# Patient Record
Sex: FEMALE | Race: WHITE | NOT HISPANIC OR LATINO | URBAN - METROPOLITAN AREA
[De-identification: names, ages, dates, MRNs, and addresses within clinical notes are randomized per-mention and may not be internally consistent; named-entity substitution may affect disease eponyms.]

---

## 2023-03-17 LAB
ANION GAP IN SER/PLAS: 13 MMOL/L (ref 10–20)
CALCIUM (MG/DL) IN SER/PLAS: 9 MG/DL (ref 8.6–10.3)
CARBON DIOXIDE, TOTAL (MMOL/L) IN SER/PLAS: 26 MMOL/L (ref 21–32)
CHLORIDE (MMOL/L) IN SER/PLAS: 102 MMOL/L (ref 98–107)
CREATININE (MG/DL) IN SER/PLAS: 0.94 MG/DL (ref 0.5–1.05)
ERYTHROCYTE DISTRIBUTION WIDTH (RATIO) BY AUTOMATED COUNT: 12.9 % (ref 11.5–14.5)
ERYTHROCYTE MEAN CORPUSCULAR HEMOGLOBIN CONCENTRATION (G/DL) BY AUTOMATED: 32.3 G/DL (ref 32–36)
ERYTHROCYTE MEAN CORPUSCULAR VOLUME (FL) BY AUTOMATED COUNT: 93 FL (ref 80–100)
ERYTHROCYTES (10*6/UL) IN BLOOD BY AUTOMATED COUNT: 4.3 X10E12/L (ref 4–5.2)
GFR FEMALE: 85 ML/MIN/1.73M2
GLUCOSE (MG/DL) IN SER/PLAS: 115 MG/DL (ref 74–99)
HEMATOCRIT (%) IN BLOOD BY AUTOMATED COUNT: 40 % (ref 36–46)
HEMOGLOBIN (G/DL) IN BLOOD: 12.9 G/DL (ref 12–16)
LEUKOCYTES (10*3/UL) IN BLOOD BY AUTOMATED COUNT: 7.3 X10E9/L (ref 4.4–11.3)
PLATELETS (10*3/UL) IN BLOOD AUTOMATED COUNT: 302 X10E9/L (ref 150–450)
POTASSIUM (MMOL/L) IN SER/PLAS: 4.2 MMOL/L (ref 3.5–5.3)
SODIUM (MMOL/L) IN SER/PLAS: 137 MMOL/L (ref 136–145)
UREA NITROGEN (MG/DL) IN SER/PLAS: 18 MG/DL (ref 6–23)

## 2023-03-31 ENCOUNTER — HOSPITAL ENCOUNTER (OUTPATIENT)
Dept: DATA CONVERSION | Facility: HOSPITAL | Age: 28
End: 2023-04-01
Attending: SURGERY | Admitting: SURGERY
Payer: COMMERCIAL

## 2023-03-31 DIAGNOSIS — Z15.01 GENETIC SUSCEPTIBILITY TO MALIGNANT NEOPLASM OF BREAST: ICD-10-CM

## 2023-03-31 DIAGNOSIS — K21.9 GASTRO-ESOPHAGEAL REFLUX DISEASE WITHOUT ESOPHAGITIS: ICD-10-CM

## 2023-03-31 DIAGNOSIS — D05.91 UNSPECIFIED TYPE OF CARCINOMA IN SITU OF RIGHT BREAST: ICD-10-CM

## 2023-03-31 DIAGNOSIS — D05.11 INTRADUCTAL CARCINOMA IN SITU OF RIGHT BREAST: ICD-10-CM

## 2023-03-31 DIAGNOSIS — M54.30 SCIATICA, UNSPECIFIED SIDE: ICD-10-CM

## 2023-03-31 LAB
APPEARANCE, URINE: CLEAR
BILIRUBIN, URINE: NEGATIVE
BLOOD, URINE: NEGATIVE
COLOR, URINE: NORMAL
GLUCOSE, URINE: NEGATIVE MG/DL
HCG, URINE: NEGATIVE
KETONES, URINE: NEGATIVE MG/DL
LEUKOCYTE ESTERASE, URINE: NEGATIVE
NITRITE, URINE: NEGATIVE
PH, URINE: 7 (ref 5–8)
PROTEIN, URINE: NEGATIVE MG/DL
SPECIFIC GRAVITY, URINE: 1 (ref 1–1.03)
UROBILINOGEN, URINE: <2 MG/DL (ref 0–1.9)

## 2023-04-10 LAB
COMPLETE PATHOLOGY REPORT: NORMAL
CONVERTED CLINICAL DIAGNOSIS-HISTORY: NORMAL
CONVERTED FINAL DIAGNOSIS: NORMAL
CONVERTED FINAL REPORT PDF LINK TO COPY AND PASTE: NORMAL
CONVERTED GROSS DESCRIPTION: NORMAL
CONVERTED INTRAOPERATIVE DIAGNOSIS: NORMAL
CONVERTED SYNOPTIC DIAGNOSIS: NORMAL

## 2023-08-30 PROBLEM — R92.30 DENSE BREAST: Status: ACTIVE | Noted: 2023-08-30

## 2023-08-30 PROBLEM — R79.89 LOW VITAMIN D LEVEL: Status: ACTIVE | Noted: 2023-08-30

## 2023-08-30 PROBLEM — S39.92XA TAILBONE INJURY: Status: ACTIVE | Noted: 2023-08-30

## 2023-08-30 PROBLEM — Z15.09 BRCA2 GENE MUTATION POSITIVE IN FEMALE: Status: ACTIVE | Noted: 2023-08-30

## 2023-08-30 PROBLEM — Z15.02 BRCA2 GENE MUTATION POSITIVE IN FEMALE: Status: ACTIVE | Noted: 2023-08-30

## 2023-08-30 PROBLEM — D05.11 DUCTAL CARCINOMA IN SITU (DCIS) OF RIGHT BREAST: Status: ACTIVE | Noted: 2023-08-30

## 2023-08-30 PROBLEM — Z15.01 BRCA2 GENE MUTATION POSITIVE IN FEMALE: Status: ACTIVE | Noted: 2023-08-30

## 2023-08-30 PROBLEM — N64.52 NIPPLE DISCHARGE IN FEMALE: Status: ACTIVE | Noted: 2023-08-30

## 2023-08-30 PROBLEM — Z30.9 CONTRACEPTION MANAGEMENT: Status: ACTIVE | Noted: 2023-08-30

## 2023-08-30 PROBLEM — R10.812 ABDOMINAL LEFT UPPER QUADRANT TENDERNESS: Status: ACTIVE | Noted: 2023-08-30

## 2023-08-30 PROBLEM — G44.209 MUSCLE TENSION HEADACHE: Status: ACTIVE | Noted: 2023-08-30

## 2023-08-30 PROBLEM — R92.8 ABNORMAL FINDING ON BREAST IMAGING: Status: ACTIVE | Noted: 2023-08-30

## 2023-08-30 PROBLEM — R92.2 DENSE BREAST: Status: ACTIVE | Noted: 2023-08-30

## 2023-08-30 RX ORDER — CYCLOBENZAPRINE HCL 5 MG
1 TABLET ORAL NIGHTLY PRN
COMMUNITY
Start: 2023-04-17 | End: 2024-05-15 | Stop reason: ALTCHOICE

## 2023-08-30 RX ORDER — CIPROFLOXACIN HYDROCHLORIDE 3 MG/ML
2 SOLUTION/ DROPS OPHTHALMIC
COMMUNITY
Start: 2022-09-25 | End: 2024-05-15 | Stop reason: ALTCHOICE

## 2023-08-30 RX ORDER — DICLOFENAC SODIUM 50 MG/1
1 TABLET, DELAYED RELEASE ORAL EVERY 8 HOURS PRN
COMMUNITY
Start: 2022-12-01 | End: 2024-05-15 | Stop reason: ALTCHOICE

## 2023-08-30 RX ORDER — CELECOXIB 200 MG/1
1 CAPSULE ORAL 2 TIMES DAILY
COMMUNITY
Start: 2022-11-21 | End: 2024-05-15 | Stop reason: ALTCHOICE

## 2023-08-30 RX ORDER — NORETHINDRONE ACETATE AND ETHINYL ESTRADIOL, AND FERROUS FUMARATE 1.5-30(21)
1 KIT ORAL DAILY
COMMUNITY
Start: 2022-09-07 | End: 2024-05-15 | Stop reason: ALTCHOICE

## 2023-08-30 RX ORDER — COPPER 313.4 MG/1
INTRAUTERINE DEVICE INTRAUTERINE
COMMUNITY

## 2023-08-30 RX ORDER — METHOCARBAMOL 500 MG/1
1 TABLET, FILM COATED ORAL EVERY 8 HOURS PRN
COMMUNITY
Start: 2022-11-21 | End: 2024-05-15 | Stop reason: ALTCHOICE

## 2023-08-30 RX ORDER — ALPRAZOLAM 0.5 MG/1
1 TABLET ORAL
COMMUNITY
Start: 2023-02-23 | End: 2024-05-15 | Stop reason: ALTCHOICE

## 2023-08-30 RX ORDER — HYDROXYZINE HYDROCHLORIDE 10 MG/1
1 TABLET, FILM COATED ORAL NIGHTLY
COMMUNITY
Start: 2023-03-16 | End: 2024-05-15 | Stop reason: ALTCHOICE

## 2023-08-30 RX ORDER — ONDANSETRON 8 MG/1
1 TABLET, ORALLY DISINTEGRATING ORAL EVERY 8 HOURS
COMMUNITY
Start: 2022-11-21 | End: 2024-05-15 | Stop reason: ALTCHOICE

## 2023-09-07 VITALS — BODY MASS INDEX: 25.29 KG/M2 | WEIGHT: 148.15 LBS | HEIGHT: 64 IN

## 2023-09-14 NOTE — PROGRESS NOTES
Service: Plastic Surgery     History of Present Illness:   History Present Illness:  Admission Reason: Pain control status post bilateral  mastectomy with reconstruction   HPI:    Ligia is status post bilateral skin sparing mastectomy with reconstruction, immediate, prepectoral direct implant with acellular dermal matrix, 490 cc.  Patient  remained overnight for pain control    Subjective Data:   LIGIA CHINCHILLA is a 27 year old Female who is Hospital Day # 2 and POD #1 for 1. right skin sparing mastectomy;2. lymphatic mapping with dual tracer (technetium + magtrace);3. right axillary sentinel  lymph node biopsy;4. left skin sparing mastectomy;     Doing well, pain is limited to the lateral aspect of the right breast.    Overnight Events: Patient had an uneventful night.     Objective Data:     Objective Information:      T   P  R  BP   MAP  SpO2   Value  36.9  91  18  119/77      95%  Date/Time 4/1 8:00 4/1 8:00 4/1 8:00 4/1 8:00    4/1 8:00  Range  (36C - 37.1C )  (91 - 109 )  (16 - 20 )  (112 - 119 )/ (59 - 77 )    (95% - 97% )  Highest temp of 37.1 C was recorded at 4/1 0:00      Pain reported at 4/1 4:37: 5 = Moderate      T   P  R  BP   MAP  SpO2   Value  36.9  91  18  119/77      95%  Date/Time 4/1 8:00 4/1 8:00 4/1 8:00 4/1 8:00    4/1 8:00  Range  (36C - 37.1C )  (91 - 109 )  (16 - 20 )  (112 - 119 )/ (59 - 77 )    (95% - 97% )  Highest temp of 37.1 C was recorded at 4/1 0:00        Pain reported at 4/1 4:37: 5 = Moderate    Physical Exam Narrative:  ·  Physical Exam:    Focused examination: Right breast no sign of excess oozing or hematoma incision clean dry and intact, flaps warm well perfused, drains serosanguineous, less than  10 cc.  Left breast: No sign of excess oozing or hematoma, flaps warm well perfused, drains less than 10 cc, serosanguineous    Assessment and Plan:   Code Status:  ·  Code Status Full Code       Impression 1: Pain control status post bilateral  mastectomy with direct  implant reconstruction.   Plan for Impression 1: Patient was given a pain medication  plan, she is instructed to alternate Advil with Norco or Tylenol.  And she may take Flexeril as needed every 8 in between.    Plan for discharge home today       Electronic Signatures:  Jim Scruggs)  (Signed 01-Apr-2023 09:14)   Authored: Service, History of Present Illness, Subjective  Data, Objective Data, Assessment and Plan, Note Completion      Last Updated: 01-Apr-2023 09:14 by Jim Scruggs)

## 2023-09-14 NOTE — H&P
"    History & Physical Reviewed:   Pregnant/Lactating:  ·  Are You Pregnant no (1)   ·  Are You Currently Breastfeeding no (1)     I have reviewed the History and Physical dated:  31-Mar-2023   History and Physical reviewed and relevant findings noted. Patient examined to review pertinent physical  findings.: No significant changes   Home Medications Reviewed: no changes noted   Allergies Reviewed: no changes noted       ERAS (Enhanced Recovery After Surgery):  ·  ERAS Patient: no     Consent:   COVID-19 Consent:  ·  COVID-19 Risk Consent Surgeon has reviewed key risks related to the risk of olivia COVID-19 and if they contract COVID-19 what the risks are.       Electronic Signatures:  Jim Scruggs)  (Signed 24-Apr-2023 13:12)   Authored: History & Physical Reviewed, ERAS, Consent,  Note Completion      Last Updated: 24-Apr-2023 13:12 by Jim Scruggs (MD)    References:  1.  Data Referenced From \"Patient Profile - Preop v3\" 31-Mar-2023 07:06   "

## 2023-10-02 NOTE — OP NOTE
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 right skin sparing mastectomy  2. lymphatic mapping with dual tracer (technetium + magtrace)  3. right axillary sentinel lymph node biopsy  4. left skin sparing mastectomy    Anesthesia: No anesthesiologist associated with this case  Estimated Blood Loss: 100ml  Findings: 1. 3 sentinel nodes negative for metastasis on frozen section        Operative Report:   The patient was identified by name and birth date in pre-op and the surgical site was marked with the plastic surgeon.  Technetium 99 was injected in the right  periareolar region in a 4 quadrant fashion. The patient was transferred to the operative suite and placed supine on the OR table.  A sign-in was performed verifying patient identity, procedure and laterality.  One dose of pre-operative antibiotics was  given.  After induction of anesthesia, 2cc of magtrace dye was injected in the subareolar region of the right breast and massaged for several minutes.  Uptake into the corresponding axilla was confirmed using the handheld Neoprobe. The bilateral breasts  and axillae were prepped and draped in the usual sterile fashion.  Just prior to incision, a time-out was performed confirming patient identity, procedure and laterality.  Attention was first turned to the right breast.  A 15-blade scalpel was used to  make an elliptical incision encompassing the NAC.  Flaps were raised superiorly to the clavicle and laterally toward the axilla and latissimus dorsi muscle, medially to the sternum and inferiorly to the inframammary fold.  the breast parenchyma and pectoralis  fascia were dissected off the pectoralis muscle with  Bovie electrocautery. The breast was marked for orientation short suture superior, long suture lateral and passed off the field to pathology.  At this point the sentinel lymph node biopsy was performed.   A handheld probe was used to confirm the area of greatest uptake in the axilla and the clavipectoral fascia was incised to gain entry into the axilla.  A hot and stained node was identified and excised in its entirety.  An additional node was identified  via the magtrace and sentimag and excised.  The axilla was evaluated and there were no palpably abnormal or enlarged nodes.  Frozen section was negative for gadiel metastasis in 3 nodes.  The clavipectoral fascia was then reapproximated with 2-0 Vicryl  suture.      Attention was then turned to the left breast.   A 15-blade scalpel was used to make an elliptical incision encompassing the NAC.  Flaps were raised superiorly to the clavicle and laterally toward the axilla and latissimus dorsi muscle, medially to the  sternum and inferiorly to the inframammary fold.  the breast parenchyma and pectoralis fascia were dissected off the pectoralis muscle with Bovie electrocautery. The breast was marked for orientation short suture superior, long suture lateral and passed  off the field to pathology.  Meticulous hemostasis was achieved using electrocautery and suture ligatures.  At this point, the case was turned over to the plastic surgeon for reconstruction which will be dictated as a separate report.    SPECIMENS:  1. Right skin sparing mastectomy- short suture superior, long lateral  2. right axillary sentinel lymph nodes  3. left skin sparing mastectomy- short suture superior, long lateral      Aston Synoptic Op Report:  Breast Harrold Node Biopsy  Operation performed with curative intent: yes  Tracer(s) used to identify sentinel nodes in the upfront surgery (non-neoadjuvant) setting: radioactive tracer and superparamagnetic iron oxide  Tracer(s) used to identify  sentinel nodes in the neoadjuvant setting: not applicable  All nodes (colored or non-colored) present at the end of a dye-filled lymphatic channel were removed: yes  All significantly radioactive nodes were removed: yes  All palpably suspicious nodes were removed: not applicable  Biopsy-proven positive nodes marked with clips prior to chemotherapy were identified and removed: not applicable                    Attestation:   Note Completion:  Attending Attestation I performed the procedure without a resident         Electronic Signatures:  Addis العلي ()  (Signed 31-Mar-2023 13:58)   Authored: Post-Operative Note, Chart Review, Note Completion      Last Updated: 31-Mar-2023 13:58 by Addis العلي ()

## 2023-10-02 NOTE — OP NOTE
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 BILATERAL SKIN SPARING MASTECTOMY W/RIGHT SENTINEL LYMPH NODE BIOPSY--see Dr. العلي's note  2.  Bilateral abdominal adjacent tissue transfer, 14 x 2 cm per side, for reconstruction of inframammary fold.  3.  Bilateral direct to implant breast reconstruction with 490cc IDEAL IMPLANT structured saline implants  4.  Use of acellular dermal matrix bilaterally  5.  Injection of bilateral intercostal nerves for postoperative analgesia not for intraoperative pain control    Anesthesia: No anesthesiologist associated with this case  Estimated Blood Loss: 50cc  Findings: As expected  Additional Details:     No qualified resident was available and Luz Brito served as my full and primary assistant during the entire case.    IMPLANTS:    RIGHT:  490cc  IDEAL IMPLANT STRUCTURED Saline breast implant  S/n: [id number]  Empty implant volume: 64cc  Back-Inner Lumen: 265cc  Front-Outer Lumen: 161cc  Total: 490cc    Cortiva Silhouette Allograft Dermis 16 x 20cm  REF: [id number]; SN: [id number]      LEFT:   IDEAL IMPLANT STRUCTURED Saline breast implant  S/N:[id number]  Empty implant volume: 64cc  Back-Inner Lumen: 265cc  Front-Outer Lumen: 161cc  Total: 490cc    Cortiva Silhouette Allograft Dermis 16 x 20cm  REF: [id number]; SN: [id number]            Operative Report:     STEWART Strong is a 26 yo female with newly diagnosed right breast cancer who is referred to me by Dr. العلي  Received outside information that patient  from [facility] in the setting of pneumonia and sepsis.   for consultation regarding breast reconstruction. I had a long discussion regarding options for unilateral and bilateral reconstruction, we discussed  indications for both, and stressed that oncologic decisions and discussions are best had with her breast surgeon and oncology team.  We reviewed autologous and implant-based reconstruction. I laid out for her in lay English the benefits and risks of both types of reconstruction. We discussed the most common reconstructions performed, we discussed the anticipated recovery and follow  up for each. I described to her autologous tissue reconstruction.  After reviewing our discussion, it is my impression that she is most interested in DTI breast reconstruction. i think this is a good option for her as she is interested in being smaller in size after surgery. Previously, we discussed and confirmed her  plan to move forward with direct implant breast reconstruction. We discussed that she would be smaller, we discussed that we cannot correctly estimate size prior to surgery, and that we would have all options available. We discussed that she would like  saline structured implants rather than silicone implants. We discussed the risk of infection, we also discussed the risk of needing to perform delayed closure if the mastectomy flaps appear to be suboptimal.    Plan: Direct implant bilateral breast reconstruction.    The patient is indicated for the above-stated procedure.       INFORMED CONSENT  Patient was told the risks, benefits, INDICATIONS, contraindications, and alternatives to the procedure. Risks include but not limited to pain, infection, bleeding, hematoma, seroma, injury to neurovascular and tendinous structures, imperfect cosmesis,  mastectomy flap necrosis, delayed wound healing, imperfect cosmesis, asymmetry, cosmetic dissatisfaction, Implant failure, infection risk, and need for subsequent surgeries.     The patient demonstrated understanding of these risks and  agreed to proceed with surgery.  Advance directives discussed.  Team approach explained.      The patient consented and wished to proceed with the procedure and for medical photography if needed.     PROCEDURAL PAUSE  Prior to the beginning of the procedure, the patient's correct identity, side, site, and procedure to be performed were verified.  The patient was given intravenous antibiotics prior to skin incision.     PROCEDURAL NOTE    Plastic surgery entered the operative theater after our breast colleagues had completed their portion of the mastectomy.  Please see notes for full operative details.    We inspected the mastectomy flaps and concluded that the flaps are well vascularized, and we would be able to proceed with immediate reconstruction.    We began by performing meticulous hemostasis bilaterally using unipolar bipolar cautery.  We then irrigated thoroughly with irrisept solution and normal saline, total of 3 L.    We began on the RIGHT side.  We noted that the IMF had been obliterated during mastectomy and so we began surgical reconstruction by performing advancement of the abdominal tissue, Sheldon flap, for reconstruction of the inframammary fold, total of 14x2 cm.  We advanced this and used  0 PDS suture to secure the Sheldon flap and new IMF to the periosteum of the ribs at the IMF which was marked preoperatively.      We then measured the breast with and noted to be 13 cm, we noted the mastectomy specimen weight to be 774g.  We obtained saline structured implant which we used as a sizer.  We then confirmed the size to be placed in the prepectoral plane to be 490cc, this size was chosen to achieve symmetric reconstruction of the breast without placing too much stress on the  mastectomy flaps.    We allowed the implant to sit in Irrisept solution.  On a back table, we then customized the acellular dermal scaffold (Cortiva Silhouette) to cover the implant, we draped the implant with the ADM to create a  hammock type scaffold, we left the ADM long  anteriorly such that it would secured to the level of the clavicle to create a slope inferiorly to the inferior pole.    We then draped the ADM/implant construct onto the prepectoral plane, we secured the ADM/implant construct superiorly at the level of the superior-most dissection, near the clavicle, this was secured with running and interrupted 2-0 Monocryl suture.   We then worked our way circumferentially from the 12 o'clock position to approximately the 4 and 8:00 positions.      We then placed two 15 Maldivian drains exiting posterior to the anterior axillary line, the first drain drained the IMF, the second drain drained the lateral axilla and the superior pole, these were secured with 0 silk suture.    30 cc of 0.25% marcaine with epinephrine was injected into multiple intercostal nerves through rib periosteum for post operative pain control and not intraoperative analgesia.    After this was completed we irrigated with 1 L of irrisept solution, draped the mastectomy flaps over the reconstruction and performed closure with 2-0 PDS in the fascia, where available, and 3-0 strata fix suture in the deep dermis, which returned  back and ran in the subcuticular layer.    We then turned our attention to the contralateral side, where identical procedure was repeated. We then measured the breast with and noted to be 13cm, and the specimen weight was 683g    We obtained saline structured implant which we used as a sizer.  We then confirmed the size to be placed in the prepectoral plane to be 490cc, this size was chosen to achieve symmetric reconstruction of the breast without placing too much stress on the  mastectomy flaps.    We allowed the implant to sit in Irrisept solution.  On a back table, we then customized the acellular dermal scaffold (Cortiva Silhouette) to cover the implant, we draped the implant with the ADM to create a hammock type scaffold, we left the ADM long   anteriorly such that it would secured to the level of the clavicle to create a slope inferiorly to the inferior pole.    We then draped the ADM/implant construct onto the prepectoral plane, we secured the ADM/implant construct superiorly at the level of the superior-most dissection, near the clavicle, this was secured with running and interrupted 2-0 Monocryl suture.   We then worked our way circumferentially from the 12 o'clock position to approximately the 4 and 8:00 positions.      We then placed two 15 Kenyan drains exiting posterior to the anterior axillary line, the first drain drained the IMF, the second drain drained the lateral axilla and the superior pole, these were secured with 0 silk suture.    30 cc of 0.25% marcaine with epinephrine was injected into multiple intercostal nerves through rib periosteum for post operative pain control and not intraoperative analgesia.     After this was completed we irrigated with 1 L of irrisept solution, draped the mastectomy flaps over the reconstruction and performed closure with 2-0 PDS in the fascia, where available, and 3-0 strata fix suture in the deep dermis, which returned  back and ran in the subcuticular layer.    At the conclusion of each side, we injected 120cc of Irriscept solution through the drains and allowed this to dwell while we continued working.  The drains were then set to bulb suction before leaving the operating room.    Final dressings consisted of dermabond and prineo over incisions, ABDs over the breast, fluffs in the axilla, and surgical bra.    The patient tolerated the procedure well and was awakened from anesthesia without any difficulties, was transferred to the patient in stable condition, all needle counts were correct.    POST OP PLAN:  Ligia will  be discharged when stable.  Discharge medications will include: narcotic pain control, antibiotics, and flexeril.  She is encouraged to shower daily.    We will plan to have postop visit  in 1 week to remove the IMF drain, which will be followed by a postop visit and week 2 to remove the superior  pole drain.                        Attestation:   Note Completion:  Attending Attestation I performed the procedure without a resident         Electronic Signatures:  Jim Scruggs)  (Signed 31-Mar-2023 12:31)   Authored: Post-Operative Note, Chart Review, Note Completion      Last Updated: 31-Mar-2023 12:31 by Jim Scruggs)

## 2023-10-06 ENCOUNTER — APPOINTMENT (OUTPATIENT)
Dept: SURGICAL ONCOLOGY | Facility: CLINIC | Age: 28
End: 2023-10-06

## 2023-10-12 PROBLEM — Z85.3 ENCOUNTER FOR FOLLOW-UP SURVEILLANCE OF BREAST CANCER: Status: ACTIVE | Noted: 2023-10-12

## 2023-10-12 PROBLEM — Z90.13 S/P BILATERAL MASTECTOMY: Status: ACTIVE | Noted: 2023-10-12

## 2023-10-12 PROBLEM — Z08 ENCOUNTER FOR FOLLOW-UP SURVEILLANCE OF BREAST CANCER: Status: ACTIVE | Noted: 2023-10-12

## 2023-10-12 NOTE — PROGRESS NOTES
Ligia Franklin female   1995 28 y.o.   [id number]    Chief Complaint  Breast cancer surveillance.     History Of Present Illness  Ligia Franklin is a pleasant 28 y.o. 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 It was ER+95%, NH+35%. Given her young age of onset and unknown biological history she was genetically tested and tested positive for the BRCA2 mutation. On 3/31/2023 Dr. Addis العلي performed a right skin sparing mastectomy and right sentinel lymph node biopsy (0/3), DCIS only spanning 5cm on path. Margins negative. Focal 1mm anterior margin. Reviewed at tumor board and no re-excision recommended. Left skin sparing mastectomy showed benign findings. Dr. Jim Scru[employer] plastics did immediate reconstruction with bilateral prepectoral direct saline implants. She was presented at Tumor Board and there is no indication for radiation therapy. Given DCIS findings alone and s/p bilateral mastectomy tumor board did not recommend endocrine therapy and patient declined therapy. She was referred to gyn/onc for evaluation for ovarian cancer screening and eventually risk reducing surgery and will see them 2023. She presents today for follow up clinical exam. No breast concerns.  [id number] stage     REPRODUCTIVE HISTORY: menarche age 14, , first birth age 26, did not breastfeed, OCP's x 10 years, premenopausal                              FAMILY CANCER HISTORY:   Patient is adopted but states her adoptive mother knows the bio mother's family history and negative    Review of Systems  Constitutional:  Negative for appetite change, fatigue, fever and unexpected weight change.   HENT:  Negative for ear pain, hearing loss, nosebleeds, sore throat and trouble swallowing.    Eyes:  Negative for discharge, itching and visual disturbance.   Breast: As stated in HPI.  Respiratory:  Negative for cough, chest tightness and shortness of  breath.    Cardiovascular:  Negative for chest pain, palpitations and leg swelling.   Gastrointestinal:  Negative for abdominal pain, constipation, diarrhea and nausea.   Endocrine: Negative for cold intolerance and heat intolerance.   Genitourinary:  Negative for dysuria, frequency, hematuria, pelvic pain and vaginal bleeding.   Musculoskeletal:  Negative for arthralgias, back pain, gait problem, joint swelling and myalgias.   Skin:  Negative for color change and rash.   Allergic/Immunologic: Negative for environmental allergies and food allergies.   Neurological:  Negative for dizziness, tremors, speech difficulty, weakness, numbness and headaches.   Hematological:  Does not bruise/bleed easily.   Psychiatric/Behavioral:  Negative for agitation, dysphoric mood and sleep disturbance. The patient is not nervous/anxious.      Past Medical History  She has a past medical history of Abnormal glucose complicating pregnancy (03/21/2022), Encounter for screening for diabetes mellitus (07/02/2021), Encounter for supervision of normal pregnancy, unspecified, unspecified trimester (03/21/2022), Nonpurulent mastitis associated with the puerperium (03/21/2022), and Unspecified disorders of lactation (03/21/2022).    Surgical History  Past Surgical History:   Procedure Laterality Date    BREAST BIOPSY  March    MASTECTOMY  March    OTHER SURGICAL HISTORY  04/20/2022    No history of surgery        Family History  Cancer-related family history includes Cancer in her father.     Social History  Social History     Tobacco Use    Smoking status: Never    Smokeless tobacco: Never   Substance Use Topics    Alcohol use: Never        Allergies  Patient has no known allergies.    Medications  Current Outpatient Medications   Medication Instructions    ALPRAZolam (Xanax) 0.5 mg tablet 1 tablet, oral, TAKE PER DIRECTED    benzocaine (Americaine) 20 % topical spray 1 Application, Topical, 4 times daily PRN    celecoxib (CeleBREX) 200 mg  "capsule 1 capsule, oral, 2 times daily    ciprofloxacin (Ciloxan) 0.3 % ophthalmic solution 2 drops, Both Eyes, Every 2 hours, PER DIRECTED    copper (ParaGard T 380A) 380 square mm IUD intrauterine, PER DIRECTED    cyclobenzaprine (Flexeril) 5 mg tablet 1 tablet, oral, Nightly PRN    diclofenac (Voltaren) 50 mg EC tablet 1 tablet, oral, Every 8 hours PRN    hydrOXYzine HCL (Atarax) 10 mg tablet 1 tablet, oral, Nightly    Junel FE 1.5/30, 28, 1.5 mg-30 mcg (21)/75 mg (7) tablet 1 tablet, oral, Daily, as directed    methocarbamol (Robaxin) 500 mg tablet 1 tablet, oral, Every 8 hours PRN    ondansetron ODT (Zofran-ODT) 8 mg disintegrating tablet 1 tablet, oral, Every 8 hours, DISSOLVE 1 TAB ON YOUR MOUTH PER DIRECTED    oxyCODONE (OXAYDO) 5 mg, oral, 3 times daily PRN    witch hazel-glycerin-aloe vera 50 % pads, medicated 1 Application, Topical, Every 6 hours PRN       Last Recorded Vitals  Blood pressure 110/82, pulse 90, resp. rate 16, height 1.626 m (5' 4\"), weight 68 kg (150 lb).     Physical Exam  Physical Exam  Chest:       Patient is alert and oriented x3 and in a relaxed and appropriate mood. Her gait is steady and hand grasps are equal. Sclera is clear. The breasts are nearly symmetrical. Bilateral breasts have been removed with well-healed mastectomy incision. Overlying tissue soft without palpable abnormalities, discrete nodules or masses. The skin appears normal. Bilateral implants are soft and mobile. There is no cervical, supraclavicular or axillary lymphadenopathy. Heart rate and rhythm normal, S1 and S2 appreciated. The lungs are clear to auscultation bilaterally. Abdomen is soft and non-tender.     Orders  No orders of the defined types were placed in this encounter.      Visit Diagnosis  1. Encounter for follow-up surveillance of breast cancer        2. BRCA2 positive        3. S/P bilateral mastectomy            Assessment/Plan  Breast cancer surveillance, normal clinical exam, history of right " breast cancer, ER+/AL+, s/p bilateral skin sparing mastectomy with saline implants, BRCA2+, unknown family history    Plan:  Return in April 2024 for clinical exam and office visit.     Patient Discussion/Summary  Your clinical examination is normal. Please return in April 2024 for clinical exam and office visit or sooner if you have any problems or concerns.     You can see your health information, review clinical summaries from office visits & test results online when you follow your health with MY  Chart, a personal health record. To sign up go to [email or web address]. If you need assistance with signing up or trouble getting into your account call [employer] Patient Line 24/7 at [phone].    My office phone number is [phone] if you need to get in touch with me or have additional questions or concerns. Thank you for choosing [facility] and trusting me as your healthcare provider. I look forward to seeing you again at your next office visit. I am honored to be a provider on your health care team and I remain dedicated to helping you achieve your health goals.    Radha Bowers, APRN-CNP

## 2023-10-13 ENCOUNTER — OFFICE VISIT (OUTPATIENT)
Dept: SURGICAL ONCOLOGY | Facility: HOSPITAL | Age: 28
End: 2023-10-13
Payer: COMMERCIAL

## 2023-10-13 VITALS
BODY MASS INDEX: 25.61 KG/M2 | DIASTOLIC BLOOD PRESSURE: 82 MMHG | WEIGHT: 150 LBS | HEIGHT: 64 IN | RESPIRATION RATE: 16 BRPM | SYSTOLIC BLOOD PRESSURE: 110 MMHG | HEART RATE: 90 BPM

## 2023-10-13 DIAGNOSIS — Z15.09 BRCA2 POSITIVE: ICD-10-CM

## 2023-10-13 DIAGNOSIS — Z85.3 ENCOUNTER FOR FOLLOW-UP SURVEILLANCE OF BREAST CANCER: Primary | ICD-10-CM

## 2023-10-13 DIAGNOSIS — Z90.13 S/P BILATERAL MASTECTOMY: ICD-10-CM

## 2023-10-13 DIAGNOSIS — Z15.01 BRCA2 POSITIVE: ICD-10-CM

## 2023-10-13 DIAGNOSIS — Z08 ENCOUNTER FOR FOLLOW-UP SURVEILLANCE OF BREAST CANCER: Primary | ICD-10-CM

## 2023-10-13 PROCEDURE — 1036F TOBACCO NON-USER: CPT | Performed by: NURSE PRACTITIONER

## 2023-10-13 PROCEDURE — 99213 OFFICE O/P EST LOW 20 MIN: CPT | Performed by: NURSE PRACTITIONER

## 2023-11-06 ENCOUNTER — OFFICE VISIT (OUTPATIENT)
Dept: GYNECOLOGIC ONCOLOGY | Facility: CLINIC | Age: 28
End: 2023-11-06
Payer: COMMERCIAL

## 2023-11-06 VITALS
SYSTOLIC BLOOD PRESSURE: 139 MMHG | RESPIRATION RATE: 16 BRPM | OXYGEN SATURATION: 100 % | HEART RATE: 84 BPM | TEMPERATURE: 97.3 F | WEIGHT: 153.66 LBS | BODY MASS INDEX: 26.38 KG/M2 | DIASTOLIC BLOOD PRESSURE: 86 MMHG

## 2023-11-06 DIAGNOSIS — Z15.01 BRCA2 POSITIVE: Primary | ICD-10-CM

## 2023-11-06 DIAGNOSIS — Z15.09 BRCA2 POSITIVE: Primary | ICD-10-CM

## 2023-11-06 PROCEDURE — 1036F TOBACCO NON-USER: CPT | Performed by: OBSTETRICS & GYNECOLOGY

## 2023-11-06 PROCEDURE — 99214 OFFICE O/P EST MOD 30 MIN: CPT | Performed by: OBSTETRICS & GYNECOLOGY

## 2023-11-06 PROCEDURE — 99204 OFFICE O/P NEW MOD 45 MIN: CPT | Performed by: OBSTETRICS & GYNECOLOGY

## 2023-11-06 ASSESSMENT — PATIENT HEALTH QUESTIONNAIRE - PHQ9
8. MOVING OR SPEAKING SO SLOWLY THAT OTHER PEOPLE COULD HAVE NOTICED. OR THE OPPOSITE, BEING SO FIGETY OR RESTLESS THAT YOU HAVE BEEN MOVING AROUND A LOT MORE THAN USUAL: 0
4. FEELING TIRED OR HAVING LITTLE ENERGY: NOT AT ALL
6. FEELING BAD ABOUT YOURSELF - OR THAT YOU ARE A FAILURE OR HAVE LET YOURSELF OR YOUR FAMILY DOWN: NOT AT ALL
9. THOUGHTS THAT YOU WOULD BE BETTER OFF DEAD, OR OF HURTING YOURSELF: NOT AT ALL
2. FEELING DOWN, DEPRESSED, IRRITABLE, OR HOPELESS: NOT AT ALL
7. TROUBLE CONCENTRATING ON THINGS, SUCH AS READING THE NEWSPAPER OR WATCHING TELEVISION: NOT AT ALL
1. LITTLE INTEREST OR PLEASURE IN DOING THINGS: NOT AT ALL
3. TROUBLE FALLING OR STAYING ASLEEP OR SLEEPING TOO MUCH: NOT AT ALL
2. FEELING DOWN, DEPRESSED OR HOPELESS: NOT AT ALL
7. TROUBLE CONCENTRATING ON THINGS, SUCH AS READING THE NEWSPAPER OR WATCHING TELEVISION: 0
7. TROUBLE CONCENTRATING ON THINGS, SUCH AS READING THE NEWSPAPER OR WATCHING TELEVISION: NOT AT ALL
3. TROUBLE FALLING OR STAYING ASLEEP OR SLEEPING TOO MUCH: 0
6. FEELING BAD ABOUT YOURSELF - OR THAT YOU ARE A FAILURE OR HAVE LET YOURSELF OR YOUR FAMILY DOWN: NOT AT ALL
9. THOUGHTS THAT YOU WOULD BE BETTER OFF DEAD, OR OF HURTING YOURSELF: NOT AT ALL
10. IF YOU CHECKED OFF ANY PROBLEMS, HOW DIFFICULT HAVE THESE PROBLEMS MADE IT FOR YOU TO DO YOUR WORK, TAKE CARE OF THINGS AT HOME, OR GET ALONG WITH OTHER PEOPLE: NOT DIFFICULT AT ALL
SUM OF ALL RESPONSES TO PHQ QUESTIONS 1-9: 0
4. FEELING TIRED OR HAVING LITTLE ENERGY: 0
5. POOR APPETITE OR OVEREATING: 0
SUM OF ALL RESPONSES TO PHQ QUESTIONS 1-9: 0
9. THOUGHTS THAT YOU WOULD BE BETTER OFF DEAD, OR OF HURTING YOURSELF: 0
8. MOVING OR SPEAKING SO SLOWLY THAT OTHER PEOPLE COULD HAVE NOTICED. OR THE OPPOSITE, BEING SO FIGETY OR RESTLESS THAT YOU HAVE BEEN MOVING AROUND A LOT MORE THAN USUAL: NOT AT ALL
2. FEELING DOWN, DEPRESSED, IRRITABLE, OR HOPELESS: 0
5. POOR APPETITE OR OVEREATING: NOT AT ALL
6. FEELING BAD ABOUT YOURSELF - OR THAT YOU ARE A FAILURE OR HAVE LET YOURSELF OR YOUR FAMILY DOWN: 0
1. LITTLE INTEREST OR PLEASURE IN DOING THINGS: NOT AT ALL
5. POOR APPETITE OR OVEREATING: NOT AT ALL
3. TROUBLE FALLING OR STAYING ASLEEP OR SLEEPING TOO MUCH: NOT AT ALL
1. LITTLE INTEREST OR PLEASURE IN DOING THINGS: 0
8. MOVING OR SPEAKING SO SLOWLY THAT OTHER PEOPLE COULD HAVE NOTICED. OR THE OPPOSITE, BEING SO FIGETY OR RESTLESS THAT YOU HAVE BEEN MOVING AROUND A LOT MORE THAN USUAL: NOT AT ALL
4. FEELING TIRED OR HAVING LITTLE ENERGY: NOT AT ALL
10. IF YOU CHECKED OFF ANY PROBLEMS, HOW DIFFICULT HAVE THESE PROBLEMS MADE IT FOR YOU TO DO YOUR WORK, TAKE CARE OF THINGS AT HOME, OR GET ALONG WITH OTHER PEOPLE: NOT DIFFICULT AT ALL

## 2023-11-06 ASSESSMENT — PAIN SCALES - GENERAL: PAINLEVEL: 0-NO PAIN

## 2023-11-06 NOTE — PROGRESS NOTES
Patient ID: Ligia Franklin is a 28 y.o. female.  Referring Physician: No referring provider defined for this encounter.  Primary Care Provider: JOEY Hilliard-JIM      Subjective    27 yo with BRCA2 mutation    Interval history - She carries the BRCA2 mutation and is here to see what the next steps will be she had a double mastectomy in 3/2023 for breast cancer and this was discovered when she found brown nipple discharge. States she has regular periods and has had IUD for 6 months. Denies any weight loss or gain, her appetite is good. Reports a Hx of mastitis 2x. Expressed she broke her tailbone during delivery of her son.    Pmh - none  PSH - none    SH  She lives with her  and child and is employed as a .  Denies tobacco, alcohol or illicit drug use.    FH  Her father had cancer  Her grandmother had breast cancer  No family history of uterine, ovarian or colon cancer.     Ob/Gyn hx   1 vaginal delivery  Denies abnormal PAPs        Review of Systems   All other systems reviewed and are negative.       Objective   BSA: 1.77 meters squared  /86 (BP Location: Right arm, Patient Position: Sitting, BP Cuff Size: Adult)   Pulse 84   Temp 36.3 °C (97.3 °F) (Temporal)   Resp 16   Wt 69.7 kg (153 lb 10.6 oz)   SpO2 100%   BMI 26.38 kg/m²      Family History   Problem Relation Name Age of Onset    Other (RIGHT VENTRICULAR DYSPLASIA) Mother      Cancer Father Dont know        Ligia Franklin  reports that she has never smoked. She has never used smokeless tobacco.  She  reports no history of alcohol use.  She  reports no history of drug use.    Physical Exam  Constitutional:       General: She is not in acute distress.     Appearance: Normal appearance.   Cardiovascular:      Rate and Rhythm: Normal rate and regular rhythm.   Pulmonary:      Effort: Pulmonary effort is normal.      Breath sounds: Normal breath sounds.   Abdominal:      General: Bowel sounds are normal. There  is no distension.   Genitourinary:     Comments: Cervix appears normal IUD strings visible no obvious adnexal masses on bimanual or rectovaginal exam  Musculoskeletal:      Right forearm: Normal.      Left forearm: Normal.      Right hand: Normal.      Left hand: Normal.      Right lower leg: Normal.      Left lower leg: Normal.      Right foot: Normal.      Left foot: Normal.         Performance Status:  Asymptomatic    Assessment/Plan      Oncology History    No history exists.          Problem List Items Addressed This Visit             ICD-10-CM    BRCA2 positive - Primary Z15.01, Z15.09       Treatment Plans       No treatment plans exist        - Discussed her BRCA2 mutation and chances of ovarian cancer and the recommended treatment includes risk reducing BSO at age 40-45 or upon completion of childbearing.  Discussed low yield of screening with TVUS and/or .    - Follow up in 6 months with Dr. Tabitha Trevino. Pelvic US for baseline before visit.          Scribe Attestation  By signing my name below, Tereza RUST Scribe   attest that this documentation has been prepared under the direction and in the presence of Tabitha Trevino MD.

## 2023-11-27 LAB
GENETICS TEST NAME-DATA CONVERSION: NORMAL
LAB MOLECULAR CA TECHNICAL NOTES: NORMAL

## 2023-12-08 ENCOUNTER — TELEPHONE (OUTPATIENT)
Dept: OBSTETRICS AND GYNECOLOGY | Facility: CLINIC | Age: 28
End: 2023-12-08

## 2024-01-18 ENCOUNTER — APPOINTMENT (OUTPATIENT)
Dept: PRIMARY CARE | Facility: CLINIC | Age: 29
End: 2024-01-18

## 2024-01-25 ENCOUNTER — APPOINTMENT (OUTPATIENT)
Dept: PRIMARY CARE | Facility: CLINIC | Age: 29
End: 2024-01-25

## 2024-02-12 ENCOUNTER — PATIENT MESSAGE (OUTPATIENT)
Dept: SURGICAL ONCOLOGY | Facility: CLINIC | Age: 29
End: 2024-02-12

## 2024-02-12 NOTE — TELEPHONE ENCOUNTER
From: Ligia Franklin  To: Addis العلي DO  Sent: 2/12/2024 8:39 AM EST  Subject: Appt     Hi! I was wondering if the April 16th appt is something my  should come too? And if he should if there is any after 3:30 appts ? Sorry to be annoying!

## 2024-04-10 NOTE — PROGRESS NOTES
BREAST SURGICAL ONCOLOGY FOLLOW UP     Assessment/Plan     1. right breast nipple discharge with DCIS g2 at 6:30 2cmFN measuring 1.7cm MRI with 11cm of enhancement s/p bilateral SSM with 5cm of DCIS on final pathology  - [id number] stage 0  - BRCA2+  -s/p bilateral SSM right slnb(0/3)     Clinical breast exam today is normal.  There is no evidence of local recurrence.    Continue follow up with gyn/onc as scheduled.    Will follow up in the breast center with me in 1 year or sooner if there are any breast concerns.     Trevin Franklin is a 29 y.o. female presents today for follow up carcinoma of the right breast.     Treatment history  Surgery: 3/31/2023: right SSM right slnb (0/3) DCIS only spanning 5cm on path. Margins negative. Focal 1mm anterior margin. Reviewed at tumor board- no re-excision recommended.   left SSM: benign findings  Radiation: NA  Systemic therapy: NA    Has since met with Dr. Trevino to discuss eventual risk reducing BSO at 40-45 or after childbearing is complete.    Felt her implant shifted overnight but then returned to normal position.  Possibly considering going smaller but hesitant to undergo more surgery.    She's had nipple tattooing with [employer].    Review of Systems   Constitutional symptoms: Denies generalized fatigue.  Denies weight change, fevers/chills, difficulty sleeping   Eyes: Denies double vision, glaucoma, cataracts.  Ear/nose/throat/mouth: Denies hearing changes, sore throat, sinus problems.  Cardiovascular: No chest pain.  Denies irregular heartbeat.  Denies ankle swelling.  Respiratory: No wheezing, cough, or shortness of breath.  Gastrointestinal: No abdominal pain,  No nausea/vomiting.  No indigestion/heartburn.  No change in bowel habits.  No constipation or diarrhea.   Genitourinary: No urinary incontinence.  No urinary frequency.  No painful urination.  Musculoskeletal: No bone pain, no muscle pain, no joint pain.   Integumentary: No rash. No  masses.  No changes in moles.  No easy bruising.  Neurological: No headaches.  No tremors. No numbness/tingling.  Psychiatric: No anxiety. No depression.  Endocrine: No excessive thirst.  Not too hot or too cold.  Not tired or fatigued.    Hematological/lymphatic: No swollen glands or blood clotting problems.  No bruising.    Objective   Physical Exam  General: Alert and oriented x 3.  Mood and affect are appropriate.  HEENT: EOMI, PERRLA.   Neck: supple, no masses, no cervical adenopathy.  Cardiovascular: no lower extremity edema.  Pulmonary: breathing non labored on room air.  GI: Abdomen soft, no masses. No hepatomegaly or splenomegaly.  Lymph nodes: No supraclavicular or axillary adenopathy bilaterally.  Musculoskeletal: Full range of motion in the upper extremities bilaterally.  Neuro: denies dizziness, tremors    Breasts: The breasts were examined in both the seated and supine positions.    RIGHT: surgically absent with implant based reconstruction. There are no skin changes, skin thickening, or dimpling. There are no masses palpated in the RIGHT breast. Tattooed NAC.  LEFT: surgically absent with implant based reconstruction. There are no skin changes, skin thickening, or dimpling. There are no masses palpated in the LEFT breast.  Tattooed NAC.      Radiology review: All images and reports were personally reviewed.         Addis العلي DO

## 2024-04-16 ENCOUNTER — OFFICE VISIT (OUTPATIENT)
Dept: SURGICAL ONCOLOGY | Facility: CLINIC | Age: 29
End: 2024-04-16
Payer: COMMERCIAL

## 2024-04-16 DIAGNOSIS — Z15.01 BRCA2 POSITIVE: ICD-10-CM

## 2024-04-16 DIAGNOSIS — Z15.09 BRCA2 POSITIVE: ICD-10-CM

## 2024-04-16 DIAGNOSIS — D05.11 DUCTAL CARCINOMA IN SITU (DCIS) OF RIGHT BREAST: Primary | ICD-10-CM

## 2024-04-16 PROCEDURE — 99213 OFFICE O/P EST LOW 20 MIN: CPT | Performed by: SURGERY

## 2024-05-08 ENCOUNTER — PATIENT MESSAGE (OUTPATIENT)
Dept: SURGICAL ONCOLOGY | Facility: HOSPITAL | Age: 29
End: 2024-05-08
Payer: COMMERCIAL

## 2024-05-08 NOTE — TELEPHONE ENCOUNTER
From: Ligia Franklin  To: Addis العلي  Sent: 5/8/2024 12:54 PM EDT  Subject: Medical question     hi! Since I had my breast surgery, I feel like I have been really struggling to lose weight and it’s just been a huge struggle to be comfortable in my own body again. Is there anything that you guys can do to help? I didn’t know if there was like a weight loss pill or something I could do to just try to get myself on track again And make me feel better and more confident. I also didn’t know if I needed a primary care doctor for this and if so, is there anybody that you would recommend?

## 2024-05-15 ENCOUNTER — OFFICE VISIT (OUTPATIENT)
Dept: PRIMARY CARE | Facility: CLINIC | Age: 29
End: 2024-05-15
Payer: COMMERCIAL

## 2024-05-15 VITALS
DIASTOLIC BLOOD PRESSURE: 78 MMHG | HEART RATE: 64 BPM | BODY MASS INDEX: 26.98 KG/M2 | WEIGHT: 158 LBS | HEIGHT: 64 IN | SYSTOLIC BLOOD PRESSURE: 110 MMHG | TEMPERATURE: 97.6 F

## 2024-05-15 DIAGNOSIS — G43.719 INTRACTABLE CHRONIC MIGRAINE WITHOUT AURA AND WITHOUT STATUS MIGRAINOSUS: ICD-10-CM

## 2024-05-15 DIAGNOSIS — F41.9 ANXIETY: ICD-10-CM

## 2024-05-15 DIAGNOSIS — R63.8 DIFFICULTY MAINTAINING WEIGHT: ICD-10-CM

## 2024-05-15 DIAGNOSIS — Z76.89 ENCOUNTER TO ESTABLISH CARE: ICD-10-CM

## 2024-05-15 DIAGNOSIS — R79.89 LOW VITAMIN D LEVEL: Primary | ICD-10-CM

## 2024-05-15 PROCEDURE — 99214 OFFICE O/P EST MOD 30 MIN: CPT | Performed by: NURSE PRACTITIONER

## 2024-05-15 PROCEDURE — 1036F TOBACCO NON-USER: CPT | Performed by: NURSE PRACTITIONER

## 2024-05-15 RX ORDER — BUPROPION HYDROCHLORIDE 150 MG/1
150 TABLET ORAL EVERY MORNING
Qty: 30 TABLET | Refills: 1 | Status: SHIPPED | OUTPATIENT
Start: 2024-05-15 | End: 2024-07-14

## 2024-05-15 RX ORDER — SUMATRIPTAN 50 MG/1
50 TABLET, FILM COATED ORAL ONCE AS NEEDED
Qty: 9 TABLET | Refills: 5 | Status: SHIPPED | OUTPATIENT
Start: 2024-05-15 | End: 2024-05-20 | Stop reason: WASHOUT

## 2024-05-15 ASSESSMENT — ENCOUNTER SYMPTOMS
NAUSEA: 0
SLEEP DISTURBANCE: 0
COUGH: 0
DIARRHEA: 0
FATIGUE: 0
VOMITING: 0
CONSTIPATION: 0
WEAKNESS: 0
FEVER: 0
AGITATION: 0
NERVOUS/ANXIOUS: 0
SHORTNESS OF BREATH: 0

## 2024-05-15 NOTE — PROGRESS NOTES
"Subjective   Patient ID: Ligia Franklin is a 29 y.o. female who presents for New Patient Visit.    She is here to establish care, [facility] breast cancer , mastectomy bilaterally and now with implants. She states one was recently dislodged by her young son accidentally and concerned she will need surgery again.    She is healthy otherwise and exercises daily. She wants to loose weight and has been unable to. She is not overweight. She also gets anxious at times with her daily life, children and health concerns.     She gets headaches about 2 x weekly and difficult to get relief with Tylenol or ibuprofen         Review of Systems   Constitutional:  Negative for fatigue and fever.   Respiratory:  Negative for cough and shortness of breath.    Cardiovascular:  Negative for chest pain and leg swelling.   Gastrointestinal:  Negative for constipation, diarrhea, nausea and vomiting.   Skin:  Negative for pallor and rash.   Neurological:  Negative for weakness.   Psychiatric/Behavioral:  Negative for agitation, behavioral problems and sleep disturbance. The patient is not nervous/anxious.        Objective   /78   Pulse 64   Temp 36.4 °C (97.6 °F)   Ht 1.626 m (5' 4\")   Wt 71.7 kg (158 lb)   BMI 27.12 kg/m²     Physical Exam  Vitals and nursing note reviewed.   Constitutional:       General: She is not in acute distress.  HENT:      Head: Normocephalic and atraumatic.   Eyes:      Pupils: Pupils are equal, round, and reactive to light.   Cardiovascular:      Rate and Rhythm: Normal rate and regular rhythm.   Pulmonary:      Effort: Pulmonary effort is normal.      Breath sounds: Normal breath sounds.   Skin:     General: Skin is warm and dry.   Neurological:      Mental Status: She is alert and oriented to person, place, and time.   Psychiatric:         Mood and Affect: Mood normal.         Assessment/Plan   Problem List Items Addressed This Visit             ICD-10-CM    Low vitamin D level - Primary R79.89    Relevant " Orders    Vitamin D 25-Hydroxy,Total (for eval of Vitamin D levels)    Hemoglobin A1C    Difficulty maintaining weight R63.8    Relevant Medications    buPROPion XL (Wellbutrin XL) 150 mg 24 hr tablet     Other Visit Diagnoses         Codes    Anxiety     F41.9    Relevant Medications    buPROPion XL (Wellbutrin XL) 150 mg 24 hr tablet    Other Relevant Orders    CBC    Comprehensive Metabolic Panel    TSH with reflex to Free T4 if abnormal    Vitamin D 25-Hydroxy,Total (for eval of Vitamin D levels)    Lipid Panel    Cortisol    Hemoglobin A1C    Intractable chronic migraine without aura and without status migrainosus     G43.719    Relevant Medications    SUMAtriptan (Imitrex) 50 mg tablet    Encounter to establish care     Z76.89

## 2024-05-20 ENCOUNTER — OFFICE VISIT (OUTPATIENT)
Dept: GYNECOLOGIC ONCOLOGY | Facility: CLINIC | Age: 29
End: 2024-05-20
Payer: COMMERCIAL

## 2024-05-20 ENCOUNTER — OFFICE VISIT (OUTPATIENT)
Dept: PLASTIC SURGERY | Facility: CLINIC | Age: 29
End: 2024-05-20
Payer: COMMERCIAL

## 2024-05-20 VITALS
TEMPERATURE: 98.1 F | OXYGEN SATURATION: 98 % | WEIGHT: 152.12 LBS | SYSTOLIC BLOOD PRESSURE: 114 MMHG | DIASTOLIC BLOOD PRESSURE: 70 MMHG | HEART RATE: 85 BPM | BODY MASS INDEX: 26.11 KG/M2

## 2024-05-20 VITALS
WEIGHT: 158 LBS | HEIGHT: 64 IN | DIASTOLIC BLOOD PRESSURE: 79 MMHG | BODY MASS INDEX: 26.98 KG/M2 | SYSTOLIC BLOOD PRESSURE: 125 MMHG | HEART RATE: 92 BPM

## 2024-05-20 DIAGNOSIS — Z15.01 BRCA2 POSITIVE: Primary | ICD-10-CM

## 2024-05-20 DIAGNOSIS — Z90.13 S/P BILATERAL MASTECTOMY: Primary | ICD-10-CM

## 2024-05-20 DIAGNOSIS — Z15.09 BRCA2 POSITIVE: Primary | ICD-10-CM

## 2024-05-20 DIAGNOSIS — D05.11 DUCTAL CARCINOMA IN SITU (DCIS) OF RIGHT BREAST: ICD-10-CM

## 2024-05-20 PROCEDURE — 99213 OFFICE O/P EST LOW 20 MIN: CPT | Performed by: OBSTETRICS & GYNECOLOGY

## 2024-05-20 PROCEDURE — 99213 OFFICE O/P EST LOW 20 MIN: CPT | Performed by: PHYSICIAN ASSISTANT

## 2024-05-20 PROCEDURE — 1036F TOBACCO NON-USER: CPT | Performed by: OBSTETRICS & GYNECOLOGY

## 2024-05-20 ASSESSMENT — PAIN SCALES - GENERAL: PAINLEVEL: 0-NO PAIN

## 2024-05-20 NOTE — PROGRESS NOTES
Department of Plastic and Reconstructive Surgery            Follow Up Visit    Date: 05/20/24  Date of Surgery: 3/31/23    Trevin Franklin is a 29 y.o. female who is s/p B/L skin sparing mastectomy with right sentinel lymph node biopsy and immediate direct to implant breast reconstruction (490cc ideal saline implant) and placement of ADM on 3/31/23.    She returns today for 1 year follow up. She has complaints of implants falling and sitting low. She endorses the implants are moving around and flipping. She states that this is causing her discomfort. She notes that the left breast sits lower than the right and there is excess skin in her armpit region on the left. She additionally feels the implants are too large and and weigh her chest down. She is interested in revision reconstruction.     Objective   Vital Signs:   Vitals:    05/20/24 0818   BP: 125/79   Pulse: 92       Gen: interactive and pleasant  Head: NCAT  Eyes: EOMI, PERRLA  Mouth: MMM  Throat: trachea midline  Cor: RRR  Pulm: nonlabored breathing  Abd: s/nt/nd  Neuro: AAOx3  Ext: extremities perfused    Focused exam of the: breast    Right breast with transverse surgical incision is well healed. There is ptosis of the right breast implant with loss of capsule. There is excess skin present that is causing movement of the implant. There is superior and medial hollowing noted.      Left breast with transverse surgical incision is well healed. There is ptosis of the left breast implant with loss of capsule. Left breast is sitting lower than the right and there is lifting of the implant at the bottom of the breast. There is excess skin present that is causing movement of the implant. There is superior and medial hollowing noted L>R.     Assessment/Plan     Ligia Franklin is a 29 y.o. female who is s/p B/L skin sparing mastectomy with right sentinel lymph node biopsy and immediate direct to implant breast reconstruction (490cc  ideal saline implant) and placement of ADM on 3/31/23.    She presents for 1 year POV. She presents today to discuss revision reconstruction. She is 1 year post operative. She has complaints of implants hanging too low. There is bilateral ptosis of the implant since surgery L>R. There is excess axillary skin and tissue of the left breast and there is superior and medial hollowing bilaterally.    Plan:   We discussed bilateral implant replacement for smaller implant, excision excess skin of the breast and left axillary region, capsulotomy/capsulorrhaphy to tighten the capsule, and autologous fat grafting for superior and medial hollowing.       I spent 20 minutes with this patient. Greater than 50% of this time was spent in the counselling and/or coordination of care of this patient.  This note was created using voice recognition software and was not corrected for typographical or grammatical errors.    Signature: Luz Brito PA-C  Date: 05/20/24

## 2024-05-20 NOTE — PROGRESS NOTES
Patient ID: Ligia Franklin is a 29 y.o. female.  Referring Physician: No referring provider defined for this encounter.  Primary Care Provider: Rosa Maria Bruno, APRN-CNP    Subjective    29 yo with BRCA2 mutation    Interval history - She carries the BRCA2 mutation and is here to see what the next steps will be she had a double mastectomy in 3/2023 for breast cancer and this was discovered when she found brown nipple discharge. States she has regular periods and has had IUD for 6 months. Denies any weight loss or gain, her appetite is good. Reports a Hx of mastitis 2x. Expressed she broke her tailbone during delivery of her son.    Pmh - none  PSH - none    SH  She lives with her  and child and is employed as a .  Denies tobacco, alcohol or illicit drug use.    FH  Her father had cancer  Her grandmother had breast cancer  No family history of uterine, ovarian or colon cancer.     Ob/Gyn hx   1 vaginal delivery  Denies abnormal PAPs    Interval History:  She has a double mastectomy in 3/2023 and is scheduled for another implant surgery for aesthetic reasons, bowel movements, bladder and appetite are normal, having regular periods, she asked about having her IUD out next year.      Objective    BSA: 1.77 meters squared  /70 (BP Location: Right arm, Patient Position: Sitting, BP Cuff Size: Adult)   Pulse 85   Temp 36.7 °C (98.1 °F) (Temporal)   Wt 69 kg (152 lb 1.9 oz)   SpO2 98%   BMI 26.11 kg/m²      Physical Exam  Constitutional:       General: She is not in acute distress.     Appearance: Normal appearance.   Cardiovascular:      Rate and Rhythm: Normal rate and regular rhythm.   Pulmonary:      Effort: Pulmonary effort is normal.      Breath sounds: Normal breath sounds.   Abdominal:      General: Bowel sounds are normal. There is no distension.   Genitourinary:     Comments: Pelvic exam: No obvious masses on bimanual or rectovaginal exam.  Uterus is small and  mobile      Musculoskeletal:      Right forearm: Normal.      Left forearm: Normal.      Right hand: Normal.      Left hand: Normal.      Right lower leg: Normal.      Left lower leg: Normal.      Right foot: Normal.      Left foot: Normal.         Performance Status:  Asymptomatic    Assessment/Plan     Oncology History    No history exists.        Problem List Items Addressed This Visit             ICD-10-CM    BRCA2 positive - Primary Z15.01, Z15.09       Treatment Plans       No treatment plans exist        - Performed pelvic examination in office today without complications. No evidence of recurrent disease in exam.  - Follow up in 12 months with Dr. Tabitha Trevino.   Follow up sooner for IUD removal as indicated.    Consider risk reducing surgery once completed childbearing, and closer to age 35-45.          Scribe Attestation  By signing my name below, I, Mahamed White   attest that this documentation has been prepared under the direction and in the presence of Tabitha Trevino MD.

## 2024-06-05 ENCOUNTER — PREP FOR PROCEDURE (OUTPATIENT)
Dept: OCCUPATIONAL MEDICINE | Facility: HOSPITAL | Age: 29
End: 2024-06-05
Payer: COMMERCIAL

## 2024-06-05 DIAGNOSIS — N64.89 POSTOPERATIVE BREAST ASYMMETRY: ICD-10-CM

## 2024-06-05 DIAGNOSIS — C50.919 MALIGNANT NEOPLASM OF FEMALE BREAST, UNSPECIFIED ESTROGEN RECEPTOR STATUS, UNSPECIFIED LATERALITY, UNSPECIFIED SITE OF BREAST (MULTI): Primary | ICD-10-CM

## 2024-06-05 DIAGNOSIS — Z98.890 S/P BREAST RECONSTRUCTION, BILATERAL: ICD-10-CM

## 2024-07-15 DIAGNOSIS — F41.9 ANXIETY: ICD-10-CM

## 2024-07-15 DIAGNOSIS — B37.31 VAGINAL YEAST INFECTION: Primary | ICD-10-CM

## 2024-07-15 DIAGNOSIS — R63.8 DIFFICULTY MAINTAINING WEIGHT: ICD-10-CM

## 2024-07-15 RX ORDER — FLUCONAZOLE 150 MG/1
150 TABLET ORAL ONCE
Qty: 1 TABLET | Refills: 0 | Status: SHIPPED | OUTPATIENT
Start: 2024-07-15 | End: 2024-07-15

## 2024-07-15 RX ORDER — BUPROPION HYDROCHLORIDE 150 MG/1
150 TABLET ORAL EVERY MORNING
Qty: 30 TABLET | Refills: 3 | Status: SHIPPED | OUTPATIENT
Start: 2024-07-15

## 2024-07-15 NOTE — TELEPHONE ENCOUNTER
Patient got back to me and said that she had spoke with you not to long ago and said that she was taking it again and it was helping her HA's

## 2024-07-15 NOTE — TELEPHONE ENCOUNTER
Patient sent mychart message:  I sent her a message back asking about the Welbutrin because it is listed as patient not taking.  You can send in difuclan now until I hear back from her        [facility] [place] [address] Clinical Support Staff  Phone Number: [phone]     Hi! I was wondering if my anxiety prescription was. Sent in. I also went to urgent care for being sick and got a yeast infection. Is it possible to get something for that also?

## 2024-08-06 ENCOUNTER — TELEPHONE (OUTPATIENT)
Dept: GYNECOLOGIC ONCOLOGY | Facility: HOSPITAL | Age: 29
End: 2024-08-06
Payer: COMMERCIAL

## 2024-08-06 DIAGNOSIS — B37.31 YEAST INFECTION OF THE VAGINA: Primary | ICD-10-CM

## 2024-08-06 RX ORDER — FLUCONAZOLE 150 MG/1
150 TABLET ORAL ONCE
Qty: 1 TABLET | Refills: 0 | Status: SHIPPED | OUTPATIENT
Start: 2024-08-06 | End: 2024-08-06

## 2024-08-06 NOTE — TELEPHONE ENCOUNTER
Patient called to report redness/swelling, white vaginal discharge, intermittent itching of vulva and vagina.    Patient states she was treated with an antibiotic prior to recent 9 day vacation.  Patient states she took Diflucan x 1 2 days with minimal relief of symptoms.    Denies urinary symptoms, denies pelvic pain/fever.    Message routed to Dr. Trevino and NP's Aliyah Dodson and Cheli Barnett    0916  Phoned patient to notify that Cheli recommends Diflucan x 1 and if symptoms do not improve following Diflucan office visit is recommended.   Message left on patient voice mail with above information.

## 2024-08-23 ENCOUNTER — TELEPHONE (OUTPATIENT)
Dept: GYNECOLOGIC ONCOLOGY | Facility: HOSPITAL | Age: 29
End: 2024-08-23
Payer: COMMERCIAL

## 2024-08-23 DIAGNOSIS — R31.9 HEMATURIA OF UNDIAGNOSED CAUSE: ICD-10-CM

## 2024-08-23 NOTE — TELEPHONE ENCOUNTER
"Patient called to report intermittent pink/\"faint red\" drainage seen on tissue following voids.  This morning reported small drops of blood seen in toilet following a void.  No spotting seen on pad or underpants. Symptoms occur intermittently and not with every void.    Patient currently has IUD in place.   Last period 2-3 weeks ago.   Patient denies pelvic cramping/pain, denies urinary symptoms.   Afebrile.    Message routed to Aliyah Dodson CNP     16:08  Phoned patient to notify that Aliyah Ddoson CNP recommends UA/culture.  Notified patient that order has been placed and she can go to any  lab to provide urine specimen.   Instructed patient to call office back with increased bleeding, pelvic pain, urinary symptoms or fever.   Patient verbalized her understanding of information given.       8/27/24  07:10   UA results forwarded to Aliyah Dodson CNP for her review.    Message sent via [employer] to patient to notify of negative UA results and to advise to call office back with increased vaginal bleeding, worsening symptoms.       "

## 2024-08-24 ENCOUNTER — LAB (OUTPATIENT)
Dept: LAB | Facility: LAB | Age: 29
End: 2024-08-24
Payer: COMMERCIAL

## 2024-08-24 DIAGNOSIS — R31.9 HEMATURIA OF UNDIAGNOSED CAUSE: ICD-10-CM

## 2024-08-24 LAB
APPEARANCE UR: ABNORMAL
BILIRUB UR STRIP.AUTO-MCNC: NEGATIVE MG/DL
COLOR UR: ABNORMAL
GLUCOSE UR STRIP.AUTO-MCNC: NORMAL MG/DL
HOLD SPECIMEN: NORMAL
KETONES UR STRIP.AUTO-MCNC: NEGATIVE MG/DL
LEUKOCYTE ESTERASE UR QL STRIP.AUTO: NEGATIVE
NITRITE UR QL STRIP.AUTO: NEGATIVE
PH UR STRIP.AUTO: 5 [PH]
PROT UR STRIP.AUTO-MCNC: NEGATIVE MG/DL
RBC # UR STRIP.AUTO: NEGATIVE /UL
SP GR UR STRIP.AUTO: 1.02
UROBILINOGEN UR STRIP.AUTO-MCNC: NORMAL MG/DL

## 2024-08-24 PROCEDURE — 81003 URINALYSIS AUTO W/O SCOPE: CPT

## 2024-09-12 ENCOUNTER — PATIENT MESSAGE (OUTPATIENT)
Dept: SURGICAL ONCOLOGY | Facility: HOSPITAL | Age: 29
End: 2024-09-12
Payer: COMMERCIAL

## 2024-10-01 ENCOUNTER — OFFICE VISIT (OUTPATIENT)
Dept: URGENT CARE | Age: 29
End: 2024-10-01
Payer: COMMERCIAL

## 2024-10-01 VITALS
HEIGHT: 64 IN | HEART RATE: 93 BPM | WEIGHT: 150 LBS | DIASTOLIC BLOOD PRESSURE: 83 MMHG | OXYGEN SATURATION: 96 % | TEMPERATURE: 98.6 F | RESPIRATION RATE: 20 BRPM | BODY MASS INDEX: 25.61 KG/M2 | SYSTOLIC BLOOD PRESSURE: 117 MMHG

## 2024-10-01 DIAGNOSIS — J02.9 SORE THROAT: ICD-10-CM

## 2024-10-01 DIAGNOSIS — R52 BODY ACHES: ICD-10-CM

## 2024-10-01 LAB
POC BINAX EXPIRATION: 0
POC BINAX NOW COVID SERIAL NUMBER: 0
POC RAPID STREP: NEGATIVE
POC SARS-COV-2 AG BINAX: NORMAL

## 2024-10-01 PROCEDURE — 87651 STREP A DNA AMP PROBE: CPT

## 2024-10-01 ASSESSMENT — ENCOUNTER SYMPTOMS: SORE THROAT: 1

## 2024-10-01 NOTE — PATIENT INSTRUCTIONS
Sore Throat:   - No difficulty swallowing  - Rapid Strep negative; will send PCR and if positive will send in antibiotic  - Good oral hydration to prevent dehydration  - Warm salt gargles; Cepacol drops/spray OTC  - Advised on s/s to seek emergent care for  - Tylenol/Motrin as needed for pain or fever  - Flu and COVID negative; advised if still not feeling well int he next 24-48 hours take home test or can come back to the  for re-evaluation

## 2024-10-01 NOTE — PROGRESS NOTES
Subjective   Patient ID: Ligia Franklin is a 29 y.o. female. They present today with a chief complaint of Earache, Generalized Body Aches, and Sore Throat (Pt c/o body aches, irritated throat, ear pain x 1 day).    History of Present Illness  ST, no difficulty swallowing; body aches and ear pain x 1 days. Denies fever, cough, sob, cp or pain with deep inspiration. No other associated symptoms or concerns to address.       Earache   Associated symptoms include a sore throat.   Sore Throat   Associated symptoms include ear pain.       Past Medical History  Allergies as of 10/01/2024    (No Known Allergies)       (Not in a hospital admission)       Past Medical History:   Diagnosis Date    Abnormal glucose complicating pregnancy ([facility]) 03/21/2022    Abnormal glucose tolerance in pregnancy    Breast cancer (Multi) January    Delayed emergence from general anesthesia March    Encounter for screening for diabetes mellitus 07/02/2021    Encounter for screening for diabetes mellitus    Encounter for supervision of normal pregnancy, unspecified, unspecified trimester 03/21/2022    Prenatal care    Nonpurulent mastitis associated with the puerperium ([facility]) 03/21/2022    Mastitis, postpartum    PONV (postoperative nausea and vomiting) March    Unspecified disorders of lactation ([facility]) 03/21/2022    Lactation problem       Past Surgical History:   Procedure Laterality Date    BREAST BIOPSY  March    MASTECTOMY  March    OTHER SURGICAL HISTORY  04/20/2022    No history of surgery        reports that she has never smoked. She has never used smokeless tobacco. She reports that she does not drink alcohol and does not use drugs.    Review of Systems  Review of Systems   HENT:  Positive for ear pain and sore throat.    10 point ROS completed and all are negative other than what is stated in the current HPI                                 Objective    Vitals:    10/01/24 1013   BP: 117/83   BP Location: Left arm   Patient  "Position: Sitting   Pulse: 93   Resp: 20   Temp: 37 °C (98.6 °F)   SpO2: 96%   Weight: 68 kg (150 lb)   Height: 1.626 m (5' 4\")     Patient's last menstrual period was 09/16/2024 (exact date).    Physical Exam  Vitals and nursing note reviewed.   Constitutional:       Appearance: Normal appearance.   HENT:      Head: Normocephalic and atraumatic.      Right Ear: Tympanic membrane, ear canal and external ear normal.      Left Ear: Tympanic membrane, ear canal and external ear normal.      Nose: Nose normal.      Mouth/Throat:      Mouth: Mucous membranes are moist.      Comments: No erythema or edema to throat; no tonsillar enlargement; no lymph node enlargement to the neck.   Cardiovascular:      Rate and Rhythm: Normal rate and regular rhythm.      Pulses: Normal pulses.      Heart sounds: Normal heart sounds.   Pulmonary:      Effort: Pulmonary effort is normal.      Breath sounds: Normal breath sounds. No wheezing or rhonchi.   Skin:     General: Skin is warm and dry.      Findings: No rash.   Neurological:      Mental Status: She is alert.         Procedures    Point of Care Test & Imaging Results from this visit  Results for orders placed or performed in visit on 10/01/24   POCT Covid-19 Rapid Antigen   Result Value Ref Range    Binax NOW Covid Serial Number 0     BINAX NOW Covid Expiration 0     POC ALFREDO-COV-2 AG  Presumptive negative test for SARS-CoV-2 (no antigen detected)     Presumptive negative test for SARS-CoV-2 (no antigen detected)   POCT rapid strep A manually resulted   Result Value Ref Range    POC Rapid Strep Negative Negative      No results found.    Diagnostic study results (if any) were reviewed by JENNIFER Hardin.    Assessment/Plan   Allergies, medications, history, and pertinent labs/EKGs/Imaging reviewed by JENNIFER Hardin.     Medical Decision Making      Orders and Diagnoses  Diagnoses and all orders for this visit:  Sore throat  -     POCT rapid strep A " manually resulted  -     Group A Streptococcus, PCR  Body aches  -     POCT Covid-19 Rapid Antigen      Medical Admin Record      Patient disposition: Home    Electronically signed by JENNIFER Hardin  10:44 AM

## 2024-10-02 LAB — S PYO DNA THROAT QL NAA+PROBE: NOT DETECTED

## 2024-10-09 ENCOUNTER — OFFICE VISIT (OUTPATIENT)
Dept: PRIMARY CARE | Facility: CLINIC | Age: 29
End: 2024-10-09
Payer: COMMERCIAL

## 2024-10-09 ENCOUNTER — TELEPHONE (OUTPATIENT)
Dept: PRIMARY CARE | Facility: CLINIC | Age: 29
End: 2024-10-09

## 2024-10-09 VITALS
HEART RATE: 97 BPM | RESPIRATION RATE: 16 BRPM | OXYGEN SATURATION: 98 % | TEMPERATURE: 98 F | BODY MASS INDEX: 25.75 KG/M2 | SYSTOLIC BLOOD PRESSURE: 126 MMHG | DIASTOLIC BLOOD PRESSURE: 80 MMHG | WEIGHT: 150 LBS

## 2024-10-09 DIAGNOSIS — B96.89 ACUTE BACTERIAL SINUSITIS: Primary | ICD-10-CM

## 2024-10-09 DIAGNOSIS — B37.9 ANTIBIOTIC-INDUCED YEAST INFECTION: ICD-10-CM

## 2024-10-09 DIAGNOSIS — T36.95XA ANTIBIOTIC-INDUCED YEAST INFECTION: ICD-10-CM

## 2024-10-09 DIAGNOSIS — J01.90 ACUTE BACTERIAL SINUSITIS: Primary | ICD-10-CM

## 2024-10-09 PROCEDURE — 1036F TOBACCO NON-USER: CPT | Performed by: NURSE PRACTITIONER

## 2024-10-09 PROCEDURE — 99213 OFFICE O/P EST LOW 20 MIN: CPT | Performed by: NURSE PRACTITIONER

## 2024-10-09 RX ORDER — AMOXICILLIN AND CLAVULANATE POTASSIUM 875; 125 MG/1; MG/1
875 TABLET, FILM COATED ORAL 2 TIMES DAILY
Qty: 20 TABLET | Refills: 0 | Status: SHIPPED | OUTPATIENT
Start: 2024-10-09 | End: 2024-10-19

## 2024-10-09 RX ORDER — FLUCONAZOLE 150 MG/1
150 TABLET ORAL ONCE
Qty: 1 TABLET | Refills: 0 | Status: SHIPPED | OUTPATIENT
Start: 2024-10-09 | End: 2024-10-11 | Stop reason: ALTCHOICE

## 2024-10-09 RX ORDER — FLUTICASONE PROPIONATE 50 MCG
1 SPRAY, SUSPENSION (ML) NASAL DAILY
Qty: 16 G | Refills: 0 | Status: SHIPPED | OUTPATIENT
Start: 2024-10-09 | End: 2024-11-08

## 2024-10-09 ASSESSMENT — ENCOUNTER SYMPTOMS
WHEEZING: 0
NAUSEA: 0
MYALGIAS: 0
RHINORRHEA: 1
FATIGUE: 0
VOMITING: 0
SINUS PRESSURE: 1
SINUS PAIN: 1
SHORTNESS OF BREATH: 0
FEVER: 0
DIARRHEA: 0
CHILLS: 0
CHEST TIGHTNESS: 0
HEADACHES: 1
COUGH: 1
ABDOMINAL PAIN: 0
SORE THROAT: 1
APPETITE CHANGE: 1

## 2024-10-09 NOTE — PROGRESS NOTES
Ligia Franklin female   1995 29 y.o.   [id number]    Chief Complaint  Annual clinical exam, history right breast cancer, BRCA2+.    History Of Present Illness  Ligia Franklin is a pleasant 29 y.o.  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 It was ER+95%, TX+35%. Given her young age of onset and unknown biological history she was genetically tested and tested positive for the BRCA2 mutation. On 3/31/2023 Dr. Addis العلي performed a right skin sparing mastectomy and right sentinel lymph node biopsy (0/3), DCIS only spanning 5cm on path. Margins negative. Focal 1mm anterior margin. Reviewed at tumor board and no re-excision recommended. Left skin sparing mastectomy showed benign findings. Dr. Jim Scruggs plastics did immediate reconstruction with bilateral prepectoral direct saline implants. She was presented at Tumor Board and there is no indication for radiation therapy. Given DCIS findings alone and s/p bilateral mastectomy tumor board did not recommend endocrine therapy and patient declined therapy. She follows with gyn/onc Dr. Tabitha Trevino. She presents today for follow up clinical exam. She if having revision surgery with Dr. Scruggs.   [id number] stage     REPRODUCTIVE HISTORY: menarche age 14, , first birth age 26, did not breastfeed, OCP's x 10 years IUD currently, LMP 25, premenopausal                              FAMILY CANCER HISTORY:   Patient is adopted but states her adoptive mother knows the bio mother's family history and negative    Review of Systems  Constitutional:  Negative for appetite change, fatigue, fever and unexpected weight change.   HENT:  Negative for ear pain, hearing loss, nosebleeds, sore throat and trouble swallowing.    Eyes:  Negative for discharge, itching and visual disturbance.   Breast: As stated in HPI.  Respiratory:  Negative for cough, chest tightness and shortness of breath.     Cardiovascular:  Negative for chest pain, palpitations and leg swelling.   Gastrointestinal:  Negative for abdominal pain, constipation, diarrhea and nausea.   Endocrine: Negative for cold intolerance and heat intolerance.   Genitourinary:  Negative for dysuria, frequency, hematuria, pelvic pain and vaginal bleeding.   Musculoskeletal:  Negative for arthralgias, back pain, gait problem, joint swelling and myalgias.   Skin:  Negative for color change and rash.   Allergic/Immunologic: Negative for environmental allergies and food allergies.   Neurological:  Negative for dizziness, tremors, speech difficulty, weakness, numbness and headaches.   Hematological:  Does not bruise/bleed easily.   Psychiatric/Behavioral:  Negative for agitation, dysphoric mood and sleep disturbance. The patient is not nervous/anxious.         Past Medical History  She has a past medical history of Abnormal glucose complicating pregnancy ([facility]) (03/21/2022), Breast cancer (Multi) (January), Delayed emergence from general anesthesia (March), Encounter for screening for diabetes mellitus (07/02/2021), Encounter for supervision of normal pregnancy, unspecified, unspecified trimester (03/21/2022), Nonpurulent mastitis associated with the puerperium ([facility]) (03/21/2022), PONV (postoperative nausea and vomiting) (March), and Unspecified disorders of lactation ([facility]) (03/21/2022).    Surgical History  Past Surgical History:   Procedure Laterality Date    BREAST BIOPSY  March    MASTECTOMY  March    OTHER SURGICAL HISTORY  04/20/2022    No history of surgery        Family History  Cancer-related family history includes Cancer in her father.     Social History  Social History     Tobacco Use    Smoking status: Never    Smokeless tobacco: Never   Substance Use Topics    Alcohol use: Never        Allergies  Patient has no known allergies.    Medications  Current Outpatient Medications   Medication Instructions    amoxicillin-pot clavulanate  "(Augmentin) 875-125 mg tablet 875 mg, oral, 2 times daily    buPROPion XL (WELLBUTRIN XL) 150 mg, oral, Every morning, Do not crush, chew, or split.    copper (ParaGard T 380A) 380 square mm IUD intrauterine, PER DIRECTED    fluticasone (Flonase) 50 mcg/actuation nasal spray 1 spray, Each Nostril, Daily, Shake gently. Before first use, prime pump. After use, clean tip and replace cap.       Last Recorded Vitals  Blood pressure 140/86, pulse 103, resp. rate 16, height 1.626 m (5' 4\"), weight 68 kg (150 lb), last menstrual period 09/16/2024.       Physical Exam  Chest:       Patient is alert and oriented x3 and in a relaxed and appropriate mood. Her gait is steady and hand grasps are equal. Sclera is clear. The breasts are nearly symmetrical. Bilateral breasts have been removed with well-healed mastectomy incision. Overlying tissue soft without palpable abnormalities, discrete nodules or masses. The skin appears normal. Bilateral implants are soft and mobile. Nipples are tattooed. There is no cervical, supraclavicular or axillary lymphadenopathy. Heart rate and rhythm normal, S1 and S2 appreciated. The lungs are clear to auscultation bilaterally.     Visit Diagnosis  1. Encounter for follow-up surveillance of breast cancer        2. Ductal carcinoma in situ (DCIS) of right breast        3. BRCA2 positive            Assessment/Plan  Breast cancer surveillance, normal clinical exam, history of right breast cancer, ER+/MI+, s/p bilateral skin sparing mastectomy with saline implants, BRCA2+, unknown family history    Plan/Patient Discussion/Summary  Return as scheduled 6/3/25 with Dr. العلي for clinical exam and office visit.     You can see your health information, review clinical summaries from office visits & test results online when you follow your health with MY  Chart, a personal health record. To sign up go to [email or web address]. If you need assistance with signing up or trouble getting into your " account call [facility] Patient Line 24/7 at [phone].    My office phone number is [phone] if you need to get in touch with me or have additional questions or concerns. Thank you for choosing [facility] and trusting me as your healthcare provider. I look forward to seeing you again at your next office visit. I am honored to be a provider on your health care team and I remain dedicated to helping you achieve your health goals.    Radha Bowers, APRN-CNP

## 2024-10-09 NOTE — TELEPHONE ENCOUNTER
Pt calling re not feeling better after a urgent care visit.. she sent you a [facility] message about it if you can advise

## 2024-10-09 NOTE — PROGRESS NOTES
Subjective   Patient ID: Ligia Franklin is a 29 y.o. female who presents for Sinus Problem.     PT does not want any retesting - she was tested at urgent care last week. Results are in chart.    Patient's symptoms started on 9/29/24 with body aches, fatigue, nasal congestion and sore throat. Pt seen at  urgent care on 10/1/24; tested for strep, flu and covid and it was all negative. Pt's symptoms started feeling better. Patient says that a few days ago, her symptoms started again with a sore throat and now her nasal drainage is yellow/green. Pt has been tying mucinex and nasal spray and nothing is helping. No chest pain or difficulty breathing.          Review of Systems   Constitutional:  Positive for appetite change. Negative for chills, fatigue and fever.   HENT:  Positive for congestion, postnasal drip, rhinorrhea, sinus pressure, sinus pain and sore throat.    Respiratory:  Positive for cough. Negative for chest tightness, shortness of breath and wheezing.    Cardiovascular:  Negative for chest pain.   Gastrointestinal:  Negative for abdominal pain, diarrhea, nausea and vomiting.   Musculoskeletal:  Negative for myalgias.   Neurological:  Positive for headaches.     Objective   /80   Pulse 97   Temp 36.7 °C (98 °F)   Resp 16   Wt 68 kg (150 lb)   LMP 09/16/2024 (Exact Date)   SpO2 98%   BMI 25.75 kg/m²     Physical Exam  Vitals reviewed.   Constitutional:       General: She is not in acute distress.     Appearance: Normal appearance. She is not toxic-appearing.   HENT:      Head: Atraumatic.      Right Ear: Tympanic membrane, ear canal and external ear normal.      Left Ear: Tympanic membrane, ear canal and external ear normal.      Nose: Congestion and rhinorrhea (copious yellow/green drainage) present.      Right Sinus: Maxillary sinus tenderness present. No frontal sinus tenderness.      Left Sinus: Maxillary sinus tenderness present. No frontal sinus tenderness.      Mouth/Throat:      Mouth:  Mucous membranes are moist.      Pharynx: Oropharynx is clear. No oropharyngeal exudate or posterior oropharyngeal erythema.   Eyes:      Conjunctiva/sclera: Conjunctivae normal.   Cardiovascular:      Rate and Rhythm: Normal rate and regular rhythm.      Heart sounds: Normal heart sounds. No murmur heard.  Pulmonary:      Effort: Pulmonary effort is normal.      Breath sounds: Normal breath sounds. No wheezing or rhonchi.   Skin:     General: Skin is warm and dry.   Neurological:      General: No focal deficit present.      Mental Status: She is alert.   Psychiatric:         Mood and Affect: Mood normal.     Assessment/Plan   Problem List Items Addressed This Visit    None  Visit Diagnoses         Codes    Acute bacterial sinusitis    -  Primary J01.90, B96.89    Relevant Medications    amoxicillin-pot clavulanate (Augmentin) 875-125 mg tablet    fluticasone (Flonase) 50 mcg/actuation nasal spray    Antibiotic-induced yeast infection     B37.9, T36.95XA    Relevant Medications    fluconazole (Diflucan) 150 mg tablet          Patient declined the need for COVID testing. Pt started on augmentin and flonase at this time. Advised patient on use of humidifier and hot steam treatments. Discussed that patient is to drink plenty of fluids and stay well hydrated. Can take tylenol as needed for any fevers or discomfort. Patient can use mucinex and flonase as well. Discussed that patient is to go to the ER for any chest pain, difficulty breathing, shortness of breath or new/concerning symptoms; she agreed. Pt to follow up in 2-3 days if no better despite the use of the medications.     Of note, diflucan sent in for pt.

## 2024-10-11 ENCOUNTER — OFFICE VISIT (OUTPATIENT)
Dept: SURGICAL ONCOLOGY | Facility: HOSPITAL | Age: 29
End: 2024-10-11
Payer: COMMERCIAL

## 2024-10-11 VITALS
WEIGHT: 150 LBS | HEART RATE: 103 BPM | SYSTOLIC BLOOD PRESSURE: 140 MMHG | BODY MASS INDEX: 25.61 KG/M2 | RESPIRATION RATE: 16 BRPM | DIASTOLIC BLOOD PRESSURE: 86 MMHG | HEIGHT: 64 IN

## 2024-10-11 DIAGNOSIS — Z15.09 BRCA2 POSITIVE: ICD-10-CM

## 2024-10-11 DIAGNOSIS — Z85.3 ENCOUNTER FOR FOLLOW-UP SURVEILLANCE OF BREAST CANCER: Primary | ICD-10-CM

## 2024-10-11 DIAGNOSIS — Z15.01 BRCA2 POSITIVE: ICD-10-CM

## 2024-10-11 DIAGNOSIS — Z08 ENCOUNTER FOR FOLLOW-UP SURVEILLANCE OF BREAST CANCER: Primary | ICD-10-CM

## 2024-10-11 DIAGNOSIS — D05.11 DUCTAL CARCINOMA IN SITU (DCIS) OF RIGHT BREAST: ICD-10-CM

## 2024-10-11 PROCEDURE — 99213 OFFICE O/P EST LOW 20 MIN: CPT | Performed by: NURSE PRACTITIONER

## 2024-10-11 PROCEDURE — 1036F TOBACCO NON-USER: CPT | Performed by: NURSE PRACTITIONER

## 2024-10-11 PROCEDURE — 3008F BODY MASS INDEX DOCD: CPT | Performed by: NURSE PRACTITIONER

## 2024-10-14 DIAGNOSIS — T36.95XA ANTIBIOTIC-INDUCED YEAST INFECTION: ICD-10-CM

## 2024-10-14 DIAGNOSIS — B37.9 ANTIBIOTIC-INDUCED YEAST INFECTION: ICD-10-CM

## 2024-10-14 RX ORDER — FLUCONAZOLE 150 MG/1
150 TABLET ORAL ONCE
Qty: 1 TABLET | Refills: 0 | Status: SHIPPED | OUTPATIENT
Start: 2024-10-14 | End: 2024-10-14

## 2024-10-14 NOTE — TELEPHONE ENCOUNTER
Patient of NP Rosa Maria Bruno  Rosa Maria is out of the office today and back tomorrow    Patient sent msg today that she was just put on antibiotics last week and diflucan was given to her for 1 day but she is still have a lot of yeast infx symptoms    Can you send in another dose for her to pharmacy?

## 2024-10-17 ENCOUNTER — APPOINTMENT (OUTPATIENT)
Dept: PLASTIC SURGERY | Facility: CLINIC | Age: 29
End: 2024-10-17
Payer: COMMERCIAL

## 2024-10-21 ENCOUNTER — APPOINTMENT (OUTPATIENT)
Dept: PLASTIC SURGERY | Facility: CLINIC | Age: 29
End: 2024-10-21
Payer: COMMERCIAL

## 2024-10-21 VITALS — WEIGHT: 150 LBS | HEIGHT: 64 IN | BODY MASS INDEX: 25.61 KG/M2

## 2024-10-21 DIAGNOSIS — Z98.890 S/P BREAST RECONSTRUCTION, BILATERAL: Primary | ICD-10-CM

## 2024-10-21 PROCEDURE — 3008F BODY MASS INDEX DOCD: CPT | Performed by: SURGERY

## 2024-10-21 PROCEDURE — 99213 OFFICE O/P EST LOW 20 MIN: CPT | Performed by: SURGERY

## 2024-10-21 NOTE — PROGRESS NOTES
Department of Plastic and Reconstructive Surgery     PREopVisit    Date: 10/21/24  Date of Surgery: 3/31/23    Trevin Franklin is a 29 y.o. female who is s/p B/L skin sparing mastectomy with right sentinel lymph node biopsy and immediate direct to implant breast reconstruction (490cc ideal saline implant) and placement of ADM on 3/31/23.    She returns today for 1 year follow up. She has complaints of implants falling and sitting low. She endorses the implants are moving around and flipping. She states that this is causing her discomfort. She notes that the left breast sits lower than the right and there is excess skin in her armpit region on the left. She additionally feels the implants are too large and and weigh her chest down. She is interested in revision reconstruction.     Objective   Vital Signs:   There were no vitals filed for this visit.      Gen: interactive and pleasant  Head: NCAT  Eyes: EOMI, PERRLA  Mouth: MMM  Throat: trachea midline  Cor: RRR  Pulm: nonlabored breathing  Abd: s/nt/nd  Neuro: AAOx3  Ext: extremities perfused    Focused exam of the: breast    Right breast with transverse surgical incision is well healed. There is ptosis of the right breast implant with loss of capsule. There is excess skin present that is causing movement of the implant. There is superior and medial hollowing noted.      Left breast with transverse surgical incision is well healed. There is ptosis of the left breast implant with loss of capsule. Left breast is sitting lower than the right and there is lifting of the implant at the bottom of the breast. There is excess skin present that is causing movement of the implant. There is superior and medial hollowing noted L>R.     Assessment/Plan     Ligia Franklin is a 29 y.o. female who is s/p B/L skin sparing mastectomy with right sentinel lymph node biopsy and immediate direct to implant breast reconstruction (490cc ideal saline implant)  and placement of ADM on 3/31/23.    She presents for 1 year POV. She presents today to discuss revision reconstruction. She is 1 year post operative. She has complaints of implants hanging too low. There is bilateral ptosis of the implant since surgery L>R. There is excess axillary skin and tissue of the left breast and there is superior and medial hollowing bilaterally.    Plan:   We discussed bilateral implant replacement for smaller implant, excision excess skin of the breast and left axillary region, capsulotomy/capsulorrhaphy to tighten the capsule, and autologous fat grafting for superior and medial hollowing.  Today we discussed downsizing to approximately  100 cc smaller size implant, the left implant does move a lot and we will plan to narrow that capsule      I spent 20 minutes with this patient. Greater than 50% of this time was spent in the counselling and/or coordination of care of this patient.  This note was created using voice recognition software and was not corrected for typographical or grammatical errors.    Signature: Jim Scruggs MD  Date: 10/21/24

## 2024-11-01 ENCOUNTER — TELEPHONE (OUTPATIENT)
Dept: GYNECOLOGIC ONCOLOGY | Facility: HOSPITAL | Age: 29
End: 2024-11-01
Payer: COMMERCIAL

## 2024-11-11 ENCOUNTER — ANESTHESIA EVENT (OUTPATIENT)
Dept: OPERATING ROOM | Facility: CLINIC | Age: 29
End: 2024-11-11
Payer: COMMERCIAL

## 2024-11-12 ENCOUNTER — PHARMACY VISIT (OUTPATIENT)
Dept: PHARMACY | Facility: CLINIC | Age: 29
End: 2024-11-12
Payer: COMMERCIAL

## 2024-11-12 ENCOUNTER — ANESTHESIA (OUTPATIENT)
Dept: OPERATING ROOM | Facility: CLINIC | Age: 29
End: 2024-11-12
Payer: COMMERCIAL

## 2024-11-12 ENCOUNTER — HOSPITAL ENCOUNTER (OUTPATIENT)
Facility: CLINIC | Age: 29
Setting detail: OUTPATIENT SURGERY
Discharge: HOME | End: 2024-11-12
Attending: SURGERY | Admitting: SURGERY
Payer: COMMERCIAL

## 2024-11-12 VITALS
HEART RATE: 91 BPM | DIASTOLIC BLOOD PRESSURE: 57 MMHG | TEMPERATURE: 96.8 F | RESPIRATION RATE: 16 BRPM | OXYGEN SATURATION: 97 % | BODY MASS INDEX: 25.48 KG/M2 | HEIGHT: 64 IN | WEIGHT: 149.25 LBS | SYSTOLIC BLOOD PRESSURE: 96 MMHG

## 2024-11-12 DIAGNOSIS — N64.89 POSTOPERATIVE BREAST ASYMMETRY: ICD-10-CM

## 2024-11-12 DIAGNOSIS — C50.919 MALIGNANT NEOPLASM OF FEMALE BREAST, UNSPECIFIED ESTROGEN RECEPTOR STATUS, UNSPECIFIED LATERALITY, UNSPECIFIED SITE OF BREAST: Primary | ICD-10-CM

## 2024-11-12 DIAGNOSIS — Z98.890 S/P BREAST RECONSTRUCTION, BILATERAL: ICD-10-CM

## 2024-11-12 PROBLEM — R11.2 PONV (POSTOPERATIVE NAUSEA AND VOMITING): Status: ACTIVE | Noted: 2024-11-12

## 2024-11-12 LAB — PREGNANCY TEST URINE, POC: NEGATIVE

## 2024-11-12 PROCEDURE — 3700000002 HC GENERAL ANESTHESIA TIME - EACH INCREMENTAL 1 MINUTE: Performed by: SURGERY

## 2024-11-12 PROCEDURE — 13101 CMPLX RPR TRUNK 2.6-7.5 CM: CPT | Performed by: SURGERY

## 2024-11-12 PROCEDURE — 88305 TISSUE EXAM BY PATHOLOGIST: CPT | Performed by: PATHOLOGY

## 2024-11-12 PROCEDURE — 2500000004 HC RX 250 GENERAL PHARMACY W/ HCPCS (ALT 636 FOR OP/ED): Performed by: SURGERY

## 2024-11-12 PROCEDURE — 7100000010 HC PHASE TWO TIME - EACH INCREMENTAL 1 MINUTE: Performed by: SURGERY

## 2024-11-12 PROCEDURE — C1789 PROSTHESIS, BREAST, IMP: HCPCS | Performed by: SURGERY

## 2024-11-12 PROCEDURE — 17999 UNLISTD PX SKN MUC MEMB SUBQ: CPT | Performed by: SURGERY

## 2024-11-12 PROCEDURE — 81025 URINE PREGNANCY TEST: CPT | Performed by: SURGERY

## 2024-11-12 PROCEDURE — 2720000007 HC OR 272 NO HCPCS: Performed by: SURGERY

## 2024-11-12 PROCEDURE — 2780000003 HC OR 278 NO HCPCS: Performed by: SURGERY

## 2024-11-12 PROCEDURE — 13102 CMPLX RPR TRUNK ADDL 5CM/<: CPT | Performed by: SURGERY

## 2024-11-12 PROCEDURE — 14001 TIS TRNFR TRUNK 10.1-30SQCM: CPT | Performed by: SURGERY

## 2024-11-12 PROCEDURE — 15772 GRFG AUTOL FAT LIPO EA ADDL: CPT | Performed by: SURGERY

## 2024-11-12 PROCEDURE — 19380 REVJ RECONSTRUCTED BREAST: CPT | Performed by: SURGERY

## 2024-11-12 PROCEDURE — 2500000005 HC RX 250 GENERAL PHARMACY W/O HCPCS: Performed by: SURGERY

## 2024-11-12 PROCEDURE — 15771 GRFG AUTOL FAT LIPO 50 CC/<: CPT | Performed by: SURGERY

## 2024-11-12 PROCEDURE — 2500000001 HC RX 250 WO HCPCS SELF ADMINISTERED DRUGS (ALT 637 FOR MEDICARE OP): Performed by: ANESTHESIOLOGY

## 2024-11-12 PROCEDURE — 2500000004 HC RX 250 GENERAL PHARMACY W/ HCPCS (ALT 636 FOR OP/ED): Performed by: ANESTHESIOLOGIST ASSISTANT

## 2024-11-12 PROCEDURE — C1781 MESH (IMPLANTABLE): HCPCS | Performed by: SURGERY

## 2024-11-12 PROCEDURE — 2500000001 HC RX 250 WO HCPCS SELF ADMINISTERED DRUGS (ALT 637 FOR MEDICARE OP): Performed by: STUDENT IN AN ORGANIZED HEALTH CARE EDUCATION/TRAINING PROGRAM

## 2024-11-12 PROCEDURE — 88342 IMHCHEM/IMCYTCHM 1ST ANTB: CPT | Performed by: PATHOLOGY

## 2024-11-12 PROCEDURE — 7100000002 HC RECOVERY ROOM TIME - EACH INCREMENTAL 1 MINUTE: Performed by: SURGERY

## 2024-11-12 PROCEDURE — 88341 IMHCHEM/IMCYTCHM EA ADD ANTB: CPT | Performed by: PATHOLOGY

## 2024-11-12 PROCEDURE — A19380 PR REVISE BREAST RECONSTRUCTION: Performed by: ANESTHESIOLOGIST ASSISTANT

## 2024-11-12 PROCEDURE — 19380 REVJ RECONSTRUCTED BREAST: CPT | Performed by: PHYSICIAN ASSISTANT

## 2024-11-12 PROCEDURE — C1889 IMPLANT/INSERT DEVICE, NOC: HCPCS | Performed by: SURGERY

## 2024-11-12 PROCEDURE — RXMED WILLOW AMBULATORY MEDICATION CHARGE

## 2024-11-12 PROCEDURE — 3600000008 HC OR TIME - EACH INCREMENTAL 1 MINUTE - PROCEDURE LEVEL THREE: Performed by: SURGERY

## 2024-11-12 PROCEDURE — 88360 TUMOR IMMUNOHISTOCHEM/MANUAL: CPT | Performed by: PATHOLOGY

## 2024-11-12 PROCEDURE — 3700000001 HC GENERAL ANESTHESIA TIME - INITIAL BASE CHARGE: Performed by: SURGERY

## 2024-11-12 PROCEDURE — 2500000002 HC RX 250 W HCPCS SELF ADMINISTERED DRUGS (ALT 637 FOR MEDICARE OP, ALT 636 FOR OP/ED): Performed by: STUDENT IN AN ORGANIZED HEALTH CARE EDUCATION/TRAINING PROGRAM

## 2024-11-12 PROCEDURE — 2500000001 HC RX 250 WO HCPCS SELF ADMINISTERED DRUGS (ALT 637 FOR MEDICARE OP): Performed by: ANESTHESIOLOGIST ASSISTANT

## 2024-11-12 PROCEDURE — A19380 PR REVISE BREAST RECONSTRUCTION: Performed by: ANESTHESIOLOGY

## 2024-11-12 PROCEDURE — 3600000003 HC OR TIME - INITIAL BASE CHARGE - PROCEDURE LEVEL THREE: Performed by: SURGERY

## 2024-11-12 PROCEDURE — 7100000009 HC PHASE TWO TIME - INITIAL BASE CHARGE: Performed by: SURGERY

## 2024-11-12 PROCEDURE — 7100000001 HC RECOVERY ROOM TIME - INITIAL BASE CHARGE: Performed by: SURGERY

## 2024-11-12 PROCEDURE — 88341 IMHCHEM/IMCYTCHM EA ADD ANTB: CPT | Mod: TC,SUR,ELYLAB,WESLAB | Performed by: PHYSICIAN ASSISTANT

## 2024-11-12 DEVICE — IMPLANTABLE DEVICE: Type: IMPLANTABLE DEVICE | Site: CHEST | Status: FUNCTIONAL

## 2024-11-12 RX ORDER — ONDANSETRON HYDROCHLORIDE 2 MG/ML
INJECTION, SOLUTION INTRAVENOUS AS NEEDED
Status: DISCONTINUED | OUTPATIENT
Start: 2024-11-12 | End: 2024-11-12

## 2024-11-12 RX ORDER — SULFAMETHOXAZOLE AND TRIMETHOPRIM 800; 160 MG/1; MG/1
1 TABLET ORAL 2 TIMES DAILY
Qty: 28 TABLET | Refills: 0 | Status: SHIPPED | OUTPATIENT
Start: 2024-11-12 | End: 2024-11-26

## 2024-11-12 RX ORDER — SODIUM CHLORIDE, SODIUM LACTATE, POTASSIUM CHLORIDE, CALCIUM CHLORIDE 600; 310; 30; 20 MG/100ML; MG/100ML; MG/100ML; MG/100ML
75 INJECTION, SOLUTION INTRAVENOUS CONTINUOUS
Status: DISCONTINUED | OUTPATIENT
Start: 2024-11-12 | End: 2024-11-12 | Stop reason: HOSPADM

## 2024-11-12 RX ORDER — METOCLOPRAMIDE HYDROCHLORIDE 5 MG/ML
10 INJECTION INTRAMUSCULAR; INTRAVENOUS ONCE AS NEEDED
Status: DISCONTINUED | OUTPATIENT
Start: 2024-11-12 | End: 2024-11-12 | Stop reason: HOSPADM

## 2024-11-12 RX ORDER — PROPOFOL 10 MG/ML
INJECTION, EMULSION INTRAVENOUS AS NEEDED
Status: DISCONTINUED | OUTPATIENT
Start: 2024-11-12 | End: 2024-11-12

## 2024-11-12 RX ORDER — ONDANSETRON HYDROCHLORIDE 2 MG/ML
4 INJECTION, SOLUTION INTRAVENOUS ONCE AS NEEDED
Status: DISCONTINUED | OUTPATIENT
Start: 2024-11-12 | End: 2024-11-12 | Stop reason: HOSPADM

## 2024-11-12 RX ORDER — OXYCODONE HYDROCHLORIDE 5 MG/1
5 TABLET ORAL EVERY 4 HOURS PRN
Status: DISCONTINUED | OUTPATIENT
Start: 2024-11-12 | End: 2024-11-12 | Stop reason: HOSPADM

## 2024-11-12 RX ORDER — FENTANYL CITRATE 50 UG/ML
INJECTION, SOLUTION INTRAMUSCULAR; INTRAVENOUS AS NEEDED
Status: DISCONTINUED | OUTPATIENT
Start: 2024-11-12 | End: 2024-11-12

## 2024-11-12 RX ORDER — LIDOCAINE HYDROCHLORIDE 20 MG/ML
INJECTION, SOLUTION INFILTRATION; PERINEURAL AS NEEDED
Status: DISCONTINUED | OUTPATIENT
Start: 2024-11-12 | End: 2024-11-12 | Stop reason: HOSPADM

## 2024-11-12 RX ORDER — MUPIROCIN 20 MG/G
OINTMENT TOPICAL AS NEEDED
Status: DISCONTINUED | OUTPATIENT
Start: 2024-11-12 | End: 2024-11-12 | Stop reason: HOSPADM

## 2024-11-12 RX ORDER — ALBUTEROL SULFATE 0.83 MG/ML
2.5 SOLUTION RESPIRATORY (INHALATION) ONCE AS NEEDED
Status: DISCONTINUED | OUTPATIENT
Start: 2024-11-12 | End: 2024-11-12 | Stop reason: HOSPADM

## 2024-11-12 RX ORDER — CEFAZOLIN 1 G/1
INJECTION, POWDER, FOR SOLUTION INTRAVENOUS AS NEEDED
Status: DISCONTINUED | OUTPATIENT
Start: 2024-11-12 | End: 2024-11-12

## 2024-11-12 RX ORDER — METHOCARBAMOL 100 MG/ML
INJECTION, SOLUTION INTRAMUSCULAR; INTRAVENOUS AS NEEDED
Status: DISCONTINUED | OUTPATIENT
Start: 2024-11-12 | End: 2024-11-12

## 2024-11-12 RX ORDER — HYDROMORPHONE HYDROCHLORIDE 1 MG/ML
0.2 INJECTION, SOLUTION INTRAMUSCULAR; INTRAVENOUS; SUBCUTANEOUS EVERY 5 MIN PRN
Status: DISCONTINUED | OUTPATIENT
Start: 2024-11-12 | End: 2024-11-12 | Stop reason: HOSPADM

## 2024-11-12 RX ORDER — MIDAZOLAM HYDROCHLORIDE 1 MG/ML
INJECTION, SOLUTION INTRAMUSCULAR; INTRAVENOUS AS NEEDED
Status: DISCONTINUED | OUTPATIENT
Start: 2024-11-12 | End: 2024-11-12

## 2024-11-12 RX ORDER — HYDROCODONE BITARTRATE AND ACETAMINOPHEN 5; 325 MG/1; MG/1
1 TABLET ORAL EVERY 6 HOURS PRN
Qty: 12 TABLET | Refills: 0 | Status: SHIPPED | OUTPATIENT
Start: 2024-11-12 | End: 2024-11-19

## 2024-11-12 RX ORDER — HYDROMORPHONE HYDROCHLORIDE 1 MG/ML
INJECTION, SOLUTION INTRAMUSCULAR; INTRAVENOUS; SUBCUTANEOUS AS NEEDED
Status: DISCONTINUED | OUTPATIENT
Start: 2024-11-12 | End: 2024-11-12

## 2024-11-12 RX ORDER — SODIUM CHLORIDE 9 MG/ML
INJECTION INTRAMUSCULAR; INTRAVENOUS; SUBCUTANEOUS AS NEEDED
Status: DISCONTINUED | OUTPATIENT
Start: 2024-11-12 | End: 2024-11-12 | Stop reason: HOSPADM

## 2024-11-12 RX ORDER — ACETAMINOPHEN 325 MG/1
TABLET ORAL AS NEEDED
Status: DISCONTINUED | OUTPATIENT
Start: 2024-11-12 | End: 2024-11-12

## 2024-11-12 RX ORDER — EPINEPHRINE 1 MG/ML
INJECTION, SOLUTION, CONCENTRATE INTRAVENOUS AS NEEDED
Status: DISCONTINUED | OUTPATIENT
Start: 2024-11-12 | End: 2024-11-12 | Stop reason: HOSPADM

## 2024-11-12 RX ORDER — SODIUM CHLORIDE, SODIUM LACTATE, POTASSIUM CHLORIDE, CALCIUM CHLORIDE 600; 310; 30; 20 MG/100ML; MG/100ML; MG/100ML; MG/100ML
INJECTION, SOLUTION INTRAVENOUS CONTINUOUS PRN
Status: DISCONTINUED | OUTPATIENT
Start: 2024-11-12 | End: 2024-11-12

## 2024-11-12 RX ORDER — GABAPENTIN 300 MG/1
CAPSULE ORAL AS NEEDED
Status: DISCONTINUED | OUTPATIENT
Start: 2024-11-12 | End: 2024-11-12

## 2024-11-12 RX ORDER — SODIUM CHLORIDE, SODIUM LACTATE, POTASSIUM CHLORIDE, AND CALCIUM CHLORIDE .6; .31; .03; .02 G/100ML; G/100ML; G/100ML; G/100ML
IRRIGANT IRRIGATION AS NEEDED
Status: DISCONTINUED | OUTPATIENT
Start: 2024-11-12 | End: 2024-11-12 | Stop reason: HOSPADM

## 2024-11-12 RX ORDER — CEPHALEXIN 500 MG/1
500 CAPSULE ORAL 3 TIMES DAILY
Qty: 42 CAPSULE | Refills: 0 | Status: SHIPPED | OUTPATIENT
Start: 2024-11-12 | End: 2024-11-26

## 2024-11-12 RX ORDER — APREPITANT 40 MG/1
CAPSULE ORAL AS NEEDED
Status: DISCONTINUED | OUTPATIENT
Start: 2024-11-12 | End: 2024-11-12

## 2024-11-12 RX ORDER — CELECOXIB 200 MG/1
CAPSULE ORAL AS NEEDED
Status: DISCONTINUED | OUTPATIENT
Start: 2024-11-12 | End: 2024-11-12

## 2024-11-12 RX ORDER — LIDOCAINE HYDROCHLORIDE 10 MG/ML
0.1 INJECTION, SOLUTION EPIDURAL; INFILTRATION; INTRACAUDAL; PERINEURAL ONCE
Status: DISCONTINUED | OUTPATIENT
Start: 2024-11-12 | End: 2024-11-12 | Stop reason: HOSPADM

## 2024-11-12 RX ORDER — LIDOCAINE HYDROCHLORIDE 20 MG/ML
INJECTION, SOLUTION INFILTRATION; PERINEURAL AS NEEDED
Status: DISCONTINUED | OUTPATIENT
Start: 2024-11-12 | End: 2024-11-12

## 2024-11-12 RX ORDER — BUPIVACAINE HYDROCHLORIDE 5 MG/ML
INJECTION, SOLUTION PERINEURAL AS NEEDED
Status: DISCONTINUED | OUTPATIENT
Start: 2024-11-12 | End: 2024-11-12 | Stop reason: HOSPADM

## 2024-11-12 RX ORDER — HYDROMORPHONE HYDROCHLORIDE 1 MG/ML
0.4 INJECTION, SOLUTION INTRAMUSCULAR; INTRAVENOUS; SUBCUTANEOUS EVERY 5 MIN PRN
Status: DISCONTINUED | OUTPATIENT
Start: 2024-11-12 | End: 2024-11-12 | Stop reason: HOSPADM

## 2024-11-12 RX ORDER — CYCLOBENZAPRINE HCL 5 MG
5 TABLET ORAL 3 TIMES DAILY PRN
Qty: 21 TABLET | Refills: 0 | Status: SHIPPED | OUTPATIENT
Start: 2024-11-12

## 2024-11-12 SDOH — HEALTH STABILITY: MENTAL HEALTH: CURRENT SMOKER: 0

## 2024-11-12 ASSESSMENT — PAIN - FUNCTIONAL ASSESSMENT
PAIN_FUNCTIONAL_ASSESSMENT: 0-10

## 2024-11-12 ASSESSMENT — COLUMBIA-SUICIDE SEVERITY RATING SCALE - C-SSRS
6. HAVE YOU EVER DONE ANYTHING, STARTED TO DO ANYTHING, OR PREPARED TO DO ANYTHING TO END YOUR LIFE?: NO
2. HAVE YOU ACTUALLY HAD ANY THOUGHTS OF KILLING YOURSELF?: NO
1. IN THE PAST MONTH, HAVE YOU WISHED YOU WERE DEAD OR WISHED YOU COULD GO TO SLEEP AND NOT WAKE UP?: NO

## 2024-11-12 ASSESSMENT — PAIN SCALES - GENERAL
PAINLEVEL_OUTOF10: 3
PAINLEVEL_OUTOF10: 4
PAINLEVEL_OUTOF10: 3
PAINLEVEL_OUTOF10: 2
PAINLEVEL_OUTOF10: 3
PAINLEVEL_OUTOF10: 3
PAINLEVEL_OUTOF10: 0 - NO PAIN
PAINLEVEL_OUTOF10: 4

## 2024-11-12 NOTE — ANESTHESIA POSTPROCEDURE EVALUATION
Patient: Ligia Franklin    Procedure Summary       Date: 11/12/24 Room / Location: [facility] OR 04 / [facility]    Anesthesia Start: 0729 Anesthesia Stop: 1039    Procedures:       bilateral revision reconstruction with implant exchange, and fat grafting (Bilateral)      Creation Cutaneous Flap Torso (Bilateral)      Capsulotomy Breast (Bilateral)      Capsulectomy Breast (Bilateral)      Insertion Adipose Tissue Breast (Bilateral)      Fat Graft (Bilateral) Diagnosis:       Malignant neoplasm of female breast, unspecified estrogen receptor status, unspecified laterality, unspecified site of breast      S/P breast reconstruction, bilateral      Postoperative breast asymmetry      (Malignant neoplasm of female breast, unspecified estrogen receptor status, unspecified laterality, unspecified site of breast (Multi) [C50.919])      (S/P breast reconstruction, bilateral [Z98.890])      (Postoperative breast asymmetry [N64.89])    Surgeons: Jim Scruggs MD Responsible Provider: Shaan Greene MD    Anesthesia Type: general ASA Status: 3            Anesthesia Type: general    Vitals Value Taken Time   BP 94/53 11/12/24 1133   Temp 36 °C (96.8 °F) 11/12/24 1118   Pulse 81 11/12/24 1133   Resp 16 11/12/24 1133   SpO2 98 % 11/12/24 1133       Anesthesia Post Evaluation    Patient location during evaluation: bedside  Patient participation: complete - patient participated  Level of consciousness: awake  Pain management: satisfactory to patient  Multimodal analgesia pain management approach  Airway patency: patent  Cardiovascular status: acceptable  Respiratory status: acceptable  Hydration status: acceptable  Postoperative Nausea and Vomiting: none  Comments: Did very well    No notable events documented.

## 2024-11-12 NOTE — OP NOTE
Plastic Surgery Operative Note       Date: 2024  [place] [facility] [place] [facility] [place]    Name: Ligia Franklin, : 1995, Age: 29 y.o., MRN: [id number], Sex: female    Diagnosis  Pre-op Diagnosis      * Malignant neoplasm of female breast, unspecified estrogen receptor status, unspecified laterality, unspecified site of breast [C50.919]     * S/P breast reconstruction, bilateral [Z98.890]     * Postoperative breast asymmetry [N64.89] Post-op Diagnosis     * Malignant neoplasm of female breast, unspecified estrogen receptor status, unspecified laterality, unspecified site of breast [C50.919]     * S/P breast reconstruction, bilateral [Z98.890]     * Postoperative breast asymmetry [N64.89]     Procedures  1.  Bilateral breast reconstruction revision, removal of 490 cc structured saline implants, placement of 370 cc structured saline implants  2.  Bilateral extensive capsulotomy/capsulorrhaphy in order to accommodate smaller implant size  3.  Bilateral 14 x 2 cm abdominal adjacent tissue transfer to reconstruct obliterated and asymmetric inframammary  4.  Excision of right axillary dogear tissue, debridement of skin, subcutaneous tissue and fascia, 9 x 2 cm = 18 cm², including the original transverse incision line which was 10 cm x 3 cm for a total of 48 cm² tissue excised including skin, subcutaneous tissue, fascia.  5.  Excisional debridement of left axillary dogear, 10 x 3 cm = 30 cm², skin, subcutaneous tissue and fascia, including the original transverse incision line which was 10 cm x 3 cm for a total of 60 cm² tissue excised including skin, subcutaneous tissue, fascia.  5.  Complex closure left breast and axilla, 13 cm of axillary closure with 10 cm of previous transverse incision, total 23 cm  6.  Complex closure right breast and axilla, 11 cm of extra closure with 10 cm of previous transverse incision, total 21 cm.  7.  Use of injectable human allograft.  8.  Bilateral autologous fat grafting, 125 cc per  side  9.  Bilateral injection of intercostal nerves bilaterally multiple multiple intercostal nerves for postoperative analgesia not intraoperative pain control  Surgeons      * Jim Scruggs - Primary    Resident/Fellow/Other Assistant:  Surgeons and Role:     * Luz Rich PA-C - Assisting  Given the inherent complexity of this surgery, a second surgeon or a surgically skilled physician assistant was necessary for the successful completion of this entire operative procedure. Nome Alisha served as the surgical assistant and was present for the entire operative procedure and participated in all aspects of patient care. This included transporting the patient to the operating room and entering room with the patient, assisting with preoperative positioning, first assisting throughout the entire surgical procedure by functioning as a second assistant surgeon, assisting with surgical closure, and finally accompanying the patient to recovery.    Staff:   Circulator: Diane Richardson Person: Debbie    Anesthesia Staff: Anesthesiologist: Shaan Greene MD  C-AA: MARK Beckford; MARK Weathers    Procedure Summary  Anesthesia: Anesthesia type not filed in the log.  ASA: ASA status not filed in the log.  Estimated Blood Loss: 50 cc   Intra-op Medications:   Administrations occurring from 0730 to 0945 on 11/12/24:   Medication Name Total Dose   lactated Ringer's irrigation solution 2,000 mL   mupirocin (Bactroban) 2 % ointment 1 Application   BUPivacaine HCl (Marcaine) 0.5 % (5 mg/mL) injection 20 mL   lidocaine (Xylocaine) 20 mg/mL (2 %) injection 20 mL   EPINEPHrine HCl (PF) (Adrenalin) injection 25 mg   sodium chloride bacteriostatic 0.9 % injection 20 mL   ceFAZolin (Ancef) vial 1 g 2 g   fentaNYL PF 0.05 mg/mL 50 mcg   HYDROmorphone (Dilaudid) 1 mg/mL injection 1 mg   lidocaine (Xylocaine) injection 2 % 100 mg   methocarbamol (Robaxin) 100 mg/mL 1,000 mg   50 mL propofol 10mg/mL 753.11 mg               Anesthesia Record               Intraprocedure I/O Totals          Intake    Propofol Drip 0.00 mL    The total shown is the total volume documented since Anesthesia Start was filed.    Total Intake 0 mL          Specimen:   ID Type Source Tests Collected by Time   1 : RIGHT MASTECTOMY SKIN Tissue BREAST REDUCTION MAMMOPLASTY LEFT SURGICAL PATHOLOGY EXAM Jim Scruggs MD 11/12/2024 0989   2 : LEFT MASTECTOMY SKIN Tissue BREAST REDUCTION MAMMOPLASTY RIGHT SURGICAL PATHOLOGY EXAM Jim Scruggs MD 11/12/2024 0939                 Drains and/or Catheters: * None in log *    Tourniquet Times:         Implants:  Implants       Type Name Action Serial No.      Mesh tigr mesh [id number] Implanted [id number]     Other CTM FLOW 1 Implanted [id number]     Other CTM FLOW 2 Implanted [id number]     Breast Implant PUREGRAFT SERENE BREAST IMPLANT L Implanted [id number]     Breast Implant PUREGRAFT BREAST IMPLANT R Implanted [id number]              Findings: As expected, wide prepectoral space with loss of breast border requiring extensive capsulotomy capsulorrhaphy    Indications: Ligia Franklin is an 29 y.o. female who is having surgery for Malignant neoplasm of female breast, unspecified estrogen receptor status, unspecified laterality, unspecified site of breast (Multi) [C50.919]  S/P breast reconstruction, bilateral [Z98.890]  Postoperative breast asymmetry [N64.89].   Ligia Franklin is a 29 y.o. female who is s/p B/L skin sparing mastectomy with right sentinel lymph node biopsy and immediate direct to implant breast reconstruction (490cc ideal saline implant) and placement of ADM on 3/31/23.     She presents for 1 year POV. She presents today to discuss revision reconstruction. She is 1 year post operative. She has complaints of implants hanging too low. There is bilateral ptosis of the implant since surgery L>R. There is excess axillary skin and tissue of the left breast and there is superior and medial  hollowing bilaterally.     Plan:   We discussed bilateral implant replacement for smaller implant, excision excess skin of the breast and left axillary region, capsulotomy/capsulorrhaphy to tighten the capsule, and autologous fat grafting for superior and medial hollowing.  Today we plan downsizing to approximately  100 cc smaller size implant, the left implant does move a lot and we will plan to narrow that capsule      I discussed with the patient that this is reconstructive surgery, and there are no guarantees of results.  Sometimes, patients are dissatisfied and sometimes patients require revision surgery.  If there is a need to return to surgery to correct any problems or complications, we indicated that in some instances, the return to the operating room may not covered by insurance.  I indicated that I do not charge the patient for return to the operating room, but there WILL BE charges for operating room/facility fees and anesthesia fees, over which we have no control.     She is indicated for above-stated procedure    INFORMED CONSENT  Patient was told the risks, benefits, INDICATIONS, contraindications, and alternatives to the procedure. Risks include but not limited to pain, infection, bleeding, hematoma, seroma, injury to neurovascular and tendinous structures, asymmetry, cosmetic dissatisfaction, loss of nipple areolar complex tattoo, irregular contour, and need for subsequent surgeries.      The patient demonstrated understanding of these risks and agreed to proceed with surgery.  Advance directives discussed.  Team approach explained.       The patient consented and wished to proceed with the procedure and for medical photography if needed.     PROCEDURAL PAUSE  Prior to the beginning of the procedure, the patient's correct identity, side, site, and procedure to be performed were verified.  The patient was given intravenous antibiotics prior to skin incision.     PROCEDURAL NOTE  Ligia was brought  to the operative theater at which point she was transferred to the operating room table.  All bony prominences were well padded, and sequential compression devices were placed to each lower extremity. Patient was then secured with safety straps.  The patient was then placed under IV sedation, and our anesthesia colleagues administered general endotracheal anesthesia.    Patient was prepped and draped in standard sterile fashion across bilateral breast and torso.    We made access incisions at the level of the ASIS bilaterally, through these access incisions were made with 15 blade scalpel, we injected total 2 L of tumescent solution into the flanks and area between the umbilicus and the pubic area. Allowed this to sit.  We turned attention to the left breast, we made a incision through the previous transverse incision with a 15 blade scalpel, dissected down with unipolar cautery into the capsule, we removed the implant en bloc.  We then measured the width of the prepectoral space and noted to be removed very wide relative to the shows a new size, therefore we were required to perform capsulotomy laterally with excision of capsule, and popcorn capsulorrhaphy followed by running 0 PDS suture to decrease the width of the capsule, this required increased procedural time and service.  We then placed a 390 cc sizer and noted this to fit perfectly.  We then noted that the IMF's were asymmetric and had descended, we used a 14 x 2 cm adjacent tissue transfer of abdominal tissue, we raised this and elevated and tacked it to the level of desired IMF's, using interrupted 0 PDS suture.      We then performed identical procedure on the contralateral side, performed capsulotomy capsulorrhaphy to deep to decrease the width of the pocket and the using a sizer to confirm prepectoral space fitting the sizer, performing extensive capsulotomy capsulorrhaphy, performing adjacent tissue transfer of abdominal tissue to reconstruct  inframammary fold.    We then performed power assisted liposuction harvesting a total of 500 cc of Lipo aspirate.  We used the PureGraft system, we decanted the system and rinsed the system according to 's instructions, and obtained a total of 300 cc of injectable fat. Given the concern for possible fat loss, and reoperation, concern was for  adequate and damaged tissue at the site increasing risk of failure and complication, and therefore CTM flow was included into the Lipo aspirate prior to closure to supplement the damaged tissue and replacing adequate tissue and increased fat graft take.    We then injected the fat into the superior medial poles above the capsule using a bimanual technique, we injected using a 14-gauge cannula, we injected 10 cc over 10 cm in a crosshatch pattern, total of 125 cc was injected.    After this was completed we placed the TIGR matrix surgical mesh onto the prepectoral space, we tacked it down to the inferior portion of the pectoralis major with 2-0 Vicryl suture will be reflected it caudally in order to allow for her to reflect anteriorly and superiorly over the implant when the implant was in place.    We then brought the implants out of the field, we placed the ideal serene structured implant onto the prepectoral space, we filled the implants to total 396 cc.  We then draped the TIGR matrix over the implant bilaterally.  We then set the patient upright we used a tailor tack technique to estimate the amount of skin to be excised from the previous transverse incision line, we connected this to the excess axillary tissue, on the left side we removed a total of 10 x 3 cm of excess axillary tissue including the original transverse incision line which was 10 cm x 3 cm for a total of 60 cm² tissue excised including skin, subcutaneous tissue, fascia.  Therefore the total amount of debridement of the left breast was 60 cm.  On the right breast we did 9 cm x 2 cm of excess  axillary dog ear tissue removed including the 10 cm x 3 cm of the previous incision line which was tailor tacked and folded, for a total of 48 cm².    We then irrigated with Irrisept solution.  We began closure, order to achieve complex closure we required to perform wide undermining a total equal to the width of the incision, on the left side this was a total of 13 cm with the original 10 cm, total 23 cm, on the right side was an 11 cm closure performed in complex fashion with the original 10 cm, total 21 cm, therefore for a total of 44 cm complex closure    We then performed closure with 2-0 Vicryl in the capsule, followed by 2-0 PDS in the layer above this, followed by running 3-0 strata fix in the deep dermis which was turned back around the subcuticular layer. Sylke adhesive was then used over the incision lines.  ABDs were placed over the breast.  The access incisions were then closed with interrupted 4-0 plain gut suture, Bactroban, 202, Telfa island dressings.  ABDs were placed over the abdomen followed by abdominal binder.  ABDs were placed over the breast and fluffs were placed in the axilla, followed by surgical bra.     The patient tolerated the procedure well and was awakened from anesthesia without any difficulties, she was transferred to the PACU in stable condition, all needle counts were correct.     POST OP PLAN:  Ligia will remain outpatient.  She is instructed to shower daily and keep the abdominal binder at all times except for while showering.  She is sent home with antibiotics, pain control.        Jim Scruggs  Phone Number: [phone]

## 2024-11-12 NOTE — ANESTHESIA PROCEDURE NOTES
Peripheral IV  Date/Time: 11/12/2024 6:55 AM  Inserted by: MARK Weathers    Placement  Needle size: 22 G  Laterality: left  Location: hand  Local anesthetic: injectable  Site prep: alcohol  Technique: anatomical landmarks  Attempts: 1

## 2024-11-12 NOTE — DISCHARGE INSTRUCTIONS
Plastic Surgery                Post Operative Instructions    Office Phone: [phone] or contact central scheduling  After-Hours Patient line: [phone]  (Use this number for medical questions/concerns only after 4pm or on the weekends)      Activity:   -avoid activities that use your chest muscles such as pushing and pulling for 3 weeks after surgery.   -no lifting greater than 10lbs for 2 weeks after surgery  -ok for walking and ambulating    Wound/Dressing Care:   -You will have a surgical bra, wear this daily. Ok to remove to shower and then replace.   -your incisions will have white surgical tape this is ok to get wet. You may shower beginning the day after surgery. We recommend warm/hot showers 1-2 times daily. Ok for soap and water.   -you may remove the band aids on your abdomen 48-72 hours after surgery.     Pain:      -For pain control we recommend alternating between tylenol and Ibuprofen every 3 hours for the first 3-5 days after surgery. Please monitor your Tylenol (acetaminophen) intake as your prescription pain medication has 325mg of Tylenol (acetaminophen) in it, do no exceed the max daily dose of 4,000mg in 24 hours. Use prescription pain medication for severe 7/10 pain or for break through pain.     May have Tylenol after: 1:15pm  May have Ibuprofen/advil/motrin/aleve after: TOMORROW 11/13/24 AT 7:15AM     Scopalamine patch may stay on for 72 hrs.  Remove patch, discard away form pets, children.  Do not touch eyes!  Wash hands thoroughly

## 2024-11-12 NOTE — ANESTHESIA PROCEDURE NOTES
Airway  Date/Time: 11/12/2024 7:37 AM  Urgency: elective    Airway not difficult    Staffing  Performed: CAA   Authorized by: MARK Weathers    Performed by: MARK Weathers  Patient location during procedure: OR    Indications and Patient Condition  Indications for airway management: anesthesia and airway protection  Spontaneous Ventilation: absent  Sedation level: deep  Preoxygenated: yes  Patient position: sniffing  Mask difficulty assessment: 1 - vent by mask    Final Airway Details  Final airway type: supraglottic airway      Successful airway: Supraglottic airway: iGel.  Size 4

## 2024-11-13 DIAGNOSIS — R33.8 ACUTE URINARY RETENTION: Primary | ICD-10-CM

## 2024-11-13 RX ORDER — OXYBUTYNIN CHLORIDE 5 MG/1
5 TABLET, EXTENDED RELEASE ORAL DAILY
Qty: 30 TABLET | Refills: 0 | Status: SHIPPED | OUTPATIENT
Start: 2024-11-13 | End: 2024-12-13

## 2024-11-15 ENCOUNTER — OFFICE VISIT (OUTPATIENT)
Dept: PRIMARY CARE | Facility: CLINIC | Age: 29
End: 2024-11-15
Payer: COMMERCIAL

## 2024-11-15 ENCOUNTER — TELEPHONE (OUTPATIENT)
Dept: GYNECOLOGIC ONCOLOGY | Facility: HOSPITAL | Age: 29
End: 2024-11-15
Payer: COMMERCIAL

## 2024-11-15 VITALS
HEART RATE: 100 BPM | SYSTOLIC BLOOD PRESSURE: 130 MMHG | WEIGHT: 157 LBS | TEMPERATURE: 98 F | OXYGEN SATURATION: 96 % | RESPIRATION RATE: 18 BRPM | DIASTOLIC BLOOD PRESSURE: 82 MMHG | BODY MASS INDEX: 26.95 KG/M2

## 2024-11-15 DIAGNOSIS — R39.9 UTI SYMPTOMS: ICD-10-CM

## 2024-11-15 DIAGNOSIS — B37.9 YEAST INFECTION: Primary | ICD-10-CM

## 2024-11-15 DIAGNOSIS — N89.8 VAGINAL DISCHARGE: Primary | ICD-10-CM

## 2024-11-15 LAB
POC APPEARANCE, URINE: ABNORMAL
POC BILIRUBIN, URINE: ABNORMAL
POC BLOOD, URINE: NEGATIVE
POC COLOR, URINE: YELLOW
POC GLUCOSE, URINE: NEGATIVE MG/DL
POC KETONES, URINE: NEGATIVE MG/DL
POC LEUKOCYTES, URINE: ABNORMAL
POC NITRITE,URINE: NEGATIVE
POC PH, URINE: 7.5 PH
POC PROTEIN, URINE: ABNORMAL MG/DL
POC SPECIFIC GRAVITY, URINE: 1.01
POC UROBILINOGEN, URINE: 0.2 EU/DL

## 2024-11-15 PROCEDURE — 87086 URINE CULTURE/COLONY COUNT: CPT

## 2024-11-15 PROCEDURE — 1036F TOBACCO NON-USER: CPT | Performed by: NURSE PRACTITIONER

## 2024-11-15 PROCEDURE — 81002 URINALYSIS NONAUTO W/O SCOPE: CPT | Performed by: NURSE PRACTITIONER

## 2024-11-15 PROCEDURE — 87205 SMEAR GRAM STAIN: CPT

## 2024-11-15 PROCEDURE — 99214 OFFICE O/P EST MOD 30 MIN: CPT | Performed by: NURSE PRACTITIONER

## 2024-11-15 RX ORDER — FLUCONAZOLE 150 MG/1
150 TABLET ORAL SEE ADMIN INSTRUCTIONS
Qty: 2 TABLET | Refills: 0 | Status: SHIPPED | OUTPATIENT
Start: 2024-11-15 | End: 2024-11-16

## 2024-11-15 ASSESSMENT — ENCOUNTER SYMPTOMS
CHILLS: 0
VOMITING: 0
DIARRHEA: 0
NAUSEA: 0
RESPIRATORY NEGATIVE: 1
FATIGUE: 0
DYSURIA: 1
CHEST TIGHTNESS: 0
FEVER: 0
APPETITE CHANGE: 0

## 2024-11-15 NOTE — TELEPHONE ENCOUNTER
Patient sent mycPulsity message reporting thick vaginal drainage, itching and labial swelling x 1 day.  Patient states she is taking antibiotics following breast reconstruction surgery on 11/12 and thinks she has a yeast infection.   Message routed to Dr. Trevino and Aliyah Dodson, CNP    1213   Dr. Trevino recommends Diflucan.    Updated Dr. Trevino that Dr. Scruggs has prescribed Diflucan.   Return secure health message sent to patient adivsing her to call office back if symptoms do not resolve after Diflucan.

## 2024-11-15 NOTE — PROGRESS NOTES
Pt had revision of breast implants on 11/12/24. Pt is taking keflex and bactrim to prevent infection. She says that since yesterday, she has had the sensation that she had to pee, but not enough comes out. No burning with urination but she does have some vaginal discharge that almost looks like a yeast infection. Patient denies any fevers but does have abdominal swelling and tenderness (she had liposuction as well).        Pt is able to pee does not hurt or burn when going, at the same time hard to go  Review of Systems   Constitutional:  Negative for appetite change, chills, fatigue and fever.   HENT: Negative.     Respiratory: Negative.  Negative for chest tightness.    Cardiovascular:  Negative for chest pain.   Gastrointestinal:  Negative for diarrhea, nausea and vomiting.   Genitourinary:  Positive for dysuria and vaginal discharge.     Objective   /82 (BP Location: Left arm, Patient Position: Sitting)   Pulse 100   Temp 36.7 °C (98 °F) (Temporal)   Resp 18   Wt 71.2 kg (157 lb)   LMP  (LMP Unknown) Comment: hcg neg  SpO2 96%   BMI 26.95 kg/m²     Physical Exam  Vitals reviewed.   Constitutional:       General: She is not in acute distress.     Appearance: Normal appearance. She is not ill-appearing or toxic-appearing.   HENT:      Head: Atraumatic.   Cardiovascular:      Rate and Rhythm: Normal rate and regular rhythm.      Heart sounds: Normal heart sounds. No murmur heard.  Pulmonary:      Effort: Pulmonary effort is normal.      Breath sounds: Normal breath sounds. No wheezing or rhonchi.   Abdominal:      Tenderness: There is no right CVA tenderness or left CVA tenderness.      Comments: Pt has abdomen wrapped in binder   Genitourinary:     Comments: Pt self swabbed for BV  Skin:     General: Skin is warm and dry.   Neurological:      General: No focal deficit present.      Mental Status: She is alert.   Psychiatric:         Mood and Affect: Mood normal.     Assessment/Plan   Problem List  Items Addressed This Visit    None  Visit Diagnoses         Codes    Vaginal discharge    -  Primary N89.8    Relevant Orders    Vaginitis Gram Stain For Bacterial Vaginosis + Yeast    UTI symptoms     R39.9    Relevant Orders    Urine Culture    POCT UA (nonautomated) manually resulted (Completed)        UA done and culture sent out. Pt already on keflex and bactrim. Pt self swabbed for BV/yeast. Pt's surgical team also sent in diflucan for pt. Discussed with patient that I will follow up with her when culture and BV/yeast swab results become available. Pt advised to go to the ER for any worsening urinary retention, fevers, chills, nausea, vomiting or new/concerning symptoms; she agreed.

## 2024-11-16 ENCOUNTER — TELEPHONE (OUTPATIENT)
Dept: PRIMARY CARE | Facility: CLINIC | Age: 29
End: 2024-11-16

## 2024-11-16 ENCOUNTER — LAB (OUTPATIENT)
Dept: LAB | Facility: LAB | Age: 29
End: 2024-11-16
Payer: COMMERCIAL

## 2024-11-16 DIAGNOSIS — F41.9 ANXIETY: ICD-10-CM

## 2024-11-16 DIAGNOSIS — R79.89 LOW VITAMIN D LEVEL: ICD-10-CM

## 2024-11-16 LAB
25(OH)D3 SERPL-MCNC: 25 NG/ML (ref 30–100)
ALBUMIN SERPL BCP-MCNC: 3.7 G/DL (ref 3.4–5)
ALP SERPL-CCNC: 74 U/L (ref 33–110)
ALT SERPL W P-5'-P-CCNC: 18 U/L (ref 7–45)
ANION GAP SERPL CALC-SCNC: 10 MMOL/L (ref 10–20)
AST SERPL W P-5'-P-CCNC: 17 U/L (ref 9–39)
BILIRUB SERPL-MCNC: 0.2 MG/DL (ref 0–1.2)
BUN SERPL-MCNC: 13 MG/DL (ref 6–23)
CALCIUM SERPL-MCNC: 8.6 MG/DL (ref 8.6–10.3)
CHLORIDE SERPL-SCNC: 108 MMOL/L (ref 98–107)
CLUE CELLS VAG LPF-#/AREA: ABNORMAL /[LPF]
CO2 SERPL-SCNC: 27 MMOL/L (ref 21–32)
CORTIS SERPL-MCNC: 11.1 UG/DL (ref 2.5–20)
CREAT SERPL-MCNC: 0.98 MG/DL (ref 0.5–1.05)
EGFRCR SERPLBLD CKD-EPI 2021: 81 ML/MIN/1.73M*2
ERYTHROCYTE [DISTWIDTH] IN BLOOD BY AUTOMATED COUNT: 13.4 % (ref 11.5–14.5)
EST. AVERAGE GLUCOSE BLD GHB EST-MCNC: 108 MG/DL
GLUCOSE SERPL-MCNC: 76 MG/DL (ref 74–99)
HBA1C MFR BLD: 5.4 %
HCT VFR BLD AUTO: 36.4 % (ref 36–46)
HGB BLD-MCNC: 11.5 G/DL (ref 12–16)
MCH RBC QN AUTO: 30.6 PG (ref 26–34)
MCHC RBC AUTO-ENTMCNC: 31.6 G/DL (ref 32–36)
MCV RBC AUTO: 97 FL (ref 80–100)
NRBC BLD-RTO: 0 /100 WBCS (ref 0–0)
NUGENT SCORE: 0
PLATELET # BLD AUTO: 284 X10*3/UL (ref 150–450)
POTASSIUM SERPL-SCNC: 4.3 MMOL/L (ref 3.5–5.3)
PROT SERPL-MCNC: 6.1 G/DL (ref 6.4–8.2)
RBC # BLD AUTO: 3.76 X10*6/UL (ref 4–5.2)
SODIUM SERPL-SCNC: 141 MMOL/L (ref 136–145)
TSH SERPL-ACNC: 1.33 MIU/L (ref 0.44–3.98)
WBC # BLD AUTO: 8.9 X10*3/UL (ref 4.4–11.3)
YEAST VAG WET PREP-#/AREA: PRESENT

## 2024-11-16 PROCEDURE — 84443 ASSAY THYROID STIM HORMONE: CPT

## 2024-11-16 PROCEDURE — 80053 COMPREHEN METABOLIC PANEL: CPT

## 2024-11-16 PROCEDURE — 82306 VITAMIN D 25 HYDROXY: CPT

## 2024-11-16 PROCEDURE — 82533 TOTAL CORTISOL: CPT

## 2024-11-16 PROCEDURE — 36415 COLL VENOUS BLD VENIPUNCTURE: CPT

## 2024-11-16 PROCEDURE — 85027 COMPLETE CBC AUTOMATED: CPT

## 2024-11-16 PROCEDURE — 83036 HEMOGLOBIN GLYCOSYLATED A1C: CPT

## 2024-11-16 NOTE — TELEPHONE ENCOUNTER
Spoke to patient and relayed results of positive yeast culture. Pt is feeling better. She took the first diflucan and has another pill to take should she still have symptoms. Will call pt when results of culture become available

## 2024-11-17 LAB — BACTERIA UR CULT: NORMAL

## 2024-11-18 ENCOUNTER — TELEPHONE (OUTPATIENT)
Dept: PRIMARY CARE | Facility: CLINIC | Age: 29
End: 2024-11-18

## 2024-11-18 ENCOUNTER — APPOINTMENT (OUTPATIENT)
Dept: PLASTIC SURGERY | Facility: CLINIC | Age: 29
End: 2024-11-18
Payer: COMMERCIAL

## 2024-11-18 VITALS
HEIGHT: 64 IN | DIASTOLIC BLOOD PRESSURE: 81 MMHG | SYSTOLIC BLOOD PRESSURE: 121 MMHG | BODY MASS INDEX: 26.8 KG/M2 | WEIGHT: 157 LBS | HEART RATE: 111 BPM

## 2024-11-18 DIAGNOSIS — Z98.890 S/P BREAST RECONSTRUCTION, BILATERAL: Primary | ICD-10-CM

## 2024-11-18 PROCEDURE — 99024 POSTOP FOLLOW-UP VISIT: CPT | Performed by: PHYSICIAN ASSISTANT

## 2024-11-18 PROCEDURE — 3008F BODY MASS INDEX DOCD: CPT | Performed by: PHYSICIAN ASSISTANT

## 2024-11-18 NOTE — PROGRESS NOTES
Department of Plastic and Reconstructive Surgery            Post Operative Visit    Date: 11/18/24  Date of Surgery: 11/12/24    Trevin Franklin is a 29 y.o. female who presents for POV. They are s/p bilateral breast reconstruction revision with implant exchange for 370cc structured saline implants and autologous fat grafting 125cc bilaterally on 11/12/24 with Dr. Scruggs.       She presents for POV. She is 1 week post op. She is recovering well. She was seen at urgent care for concerns of UTI. She additionally had complaints of vaginal itching, discharge, and urinary frequency. She endorses this all resolved after being treated for vaginal yeast infection. She endorses flank soreness and numbness. She denied other concerns at this time.       Objective   Vital Signs:   Vitals:    11/18/24 0823   BP: 121/81   Pulse: (!) 111     Gen: interactive and pleasant  Head: NCAT  Eyes: EOMI, PERRLA  Mouth: MMM  Throat: trachea midline  Cor: RRR  Pulm: nonlabored breathing  Abd: s/nt/nd  Neuro: AAOx3  Ext: extremities perfused    Focused exam of the: breast    Right breast with transverse incision intact with Sylke dressing. There is resolving ecchymosis diffusely of the breast. There is no overlying erythema or concerns for infection. There is mild edema of the breast.     Left breast with transverse incision intact with Sylke dressing. There is resolving ecchymosis diffusely of the breast. There is no overlying erythema or concerns for infection. There is mild edema of the breast.     Abdomen: there is no flank echcymosis. There is bilateral soreness and tenderness of the flanks.     Assessment/Plan     Ligia Franklin is a 29 y.o. female who presents for POV. They are s/p bilateral breast reconstruction revision with implant exchange for 370cc structured saline implants and autologous fat grafting 125cc bilaterally on 11/12/24 with Dr. Scruggs.       She presents for POV. She is recovering  well. She is to continue antibiotics to completion. She is to continue abdominal binder for an additional week. Continue lifting restrictions for an additional week. She was advised to perform heat and massage to the flanks for pain control. She was advised that mild numbness following liposuction is expected and usually resolves over time.       Plan:   Follow up in 1 month  continue antibiotics to completion  Continue lifting restrictions for an additional week  ontinue abdominal binder for an additional week   heat and massage to the flanks for pain control    I spent 10 minutes with this patient. Greater than 50% of this time was spent in the counselling and/or coordination of care of this patient.  This note was created using voice recognition software and was not corrected for typographical or grammatical errors.    Signature: Luz Brito PA-C

## 2024-11-25 DIAGNOSIS — Z90.13 S/P BILATERAL MASTECTOMY: ICD-10-CM

## 2024-11-25 DIAGNOSIS — Z15.09 BRCA2 POSITIVE: Primary | ICD-10-CM

## 2024-11-25 DIAGNOSIS — Z15.01 BRCA2 POSITIVE: Primary | ICD-10-CM

## 2024-11-25 LAB
LAB AP ASR DISCLAIMER: NORMAL
LAB AP BLOCK FOR ADDITIONAL STUDIES: NORMAL
LABORATORY COMMENT REPORT: NORMAL
PATH REPORT.FINAL DX SPEC: NORMAL
PATH REPORT.GROSS SPEC: NORMAL
PATH REPORT.RELEVANT HX SPEC: NORMAL
PATH REPORT.TOTAL CANCER: NORMAL
PATHOLOGY SYNOPTIC REPORT: NORMAL

## 2024-11-26 ENCOUNTER — PATIENT MESSAGE (OUTPATIENT)
Dept: SURGICAL ONCOLOGY | Facility: HOSPITAL | Age: 29
End: 2024-11-26
Payer: COMMERCIAL

## 2024-11-26 DIAGNOSIS — F32.A DEPRESSION, UNSPECIFIED DEPRESSION TYPE: Primary | ICD-10-CM

## 2024-11-26 RX ORDER — BUPROPION HYDROCHLORIDE 300 MG/1
300 TABLET ORAL EVERY MORNING
Qty: 30 TABLET | Refills: 3 | Status: SHIPPED | OUTPATIENT
Start: 2024-11-26 | End: 2025-03-26

## 2024-12-02 ENCOUNTER — APPOINTMENT (OUTPATIENT)
Dept: PLASTIC SURGERY | Facility: CLINIC | Age: 29
End: 2024-12-02
Payer: COMMERCIAL

## 2024-12-02 VITALS — SYSTOLIC BLOOD PRESSURE: 123 MMHG | HEART RATE: 86 BPM | DIASTOLIC BLOOD PRESSURE: 79 MMHG

## 2024-12-02 DIAGNOSIS — Z98.890 S/P BREAST RECONSTRUCTION, BILATERAL: Primary | ICD-10-CM

## 2024-12-02 PROCEDURE — 99024 POSTOP FOLLOW-UP VISIT: CPT | Performed by: SURGERY

## 2024-12-02 NOTE — PROGRESS NOTES
Division of Plastic & Reconstructive Surgery            Postoperative visit    Date: 12/2/2024         Trevin Franklin is a 29 y.o. female who presents status post bilateral breast reconstruction revision, with removal of 490 cc structured saline implants, placement of 370 cc structured saline implants, with bilateral fat grafting and bilateral axillary dogear removal performed on 11/12/2024   Previously she is status post bilateral skin sparing mastectomy with the right sentinel node biopsy and immediate direct to implant breast reconstruction on 3/31/2023.    Unfortunately the specimens from 11/12/2024 surgery, right side specimen showed positive residual invasive carcinoma, as well as DCIS.    Patient was contacted and were made aware of this, she has set up follow-up appointments with Dr. العلي.    Otherwise she is doing well.  She is thrilled with her outcome.      Objective    Vitals:    12/02/24 1509   BP: 123/79   Pulse: 86       Gen: interactive and pleasant  Head: NCAT  Eyes: EOMI, PERRLA  Mouth: MMM  Throat: trachea midline  Cor: RRR  Pulm: nonlabored breathing  Abd: s/nt/nd  Neuro: AAOx3  Ext: extremities perfused    There is no height or weight on file to calculate BMI.      Focused exam of the:   Bilateral breast transverse incisions extending to the axilla are clean dry and intact  Excellent symmetry  No contour irregularity although there is some knots identified in the areas of liposuction along the lateral flanks    Assessment/Plan       Ligia is a 29 y.o. female who presents for postoperative visit status post  bilateral breast reconstruction revision, with removal of 490 cc structured saline implants, placement of 370 cc structured saline implants, with bilateral fat grafting and bilateral axillary dogear removal performed on 11/12/2024   Previously she is status post bilateral skin sparing mastectomy with the right sentinel node biopsy and immediate direct to  implant breast reconstruction on 3/31/2023.    Unfortunately the specimens from 11/12/2024 surgery, right side specimen showed positive residual invasive carcinoma, as well as DCIS.    Plan:   She may remove the adhesive skin glue as tolerated in the shower.  She may move forward with massages  We will plan to see her in follow-up in 1 month  Patient will be presented at tumor board on Thursday.    I spent 30 minutes with this patient. Greater than 50% of this time was spent in the counselling and/or coordination of care of this patient.  This note was created using voice recognition software and was not corrected for typographical or grammatical errors.    Signature: Jim Scruggs MD   Date: 12/2/2024

## 2024-12-02 NOTE — PROGRESS NOTES
BREAST SURGICAL ONCOLOGY FOLLOW UP     Assessment/Plan     1. right breast nipple discharge with DCIS g2 at 6:30 2cmFN measuring 1.7cm MRI with 11cm of enhancement s/p bilateral SSM with 5cm of DCIS on final pathology  - mFspD3H2 stage 0  - BRCA2+  -s/p bilateral SSM right slnb(0/3)     2.  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  Recommendations were for MRI now to assess extent of possible residual disease.  Based on MRI findings, will plan for surgery for excision of residual tissue- possibly with downsizing of implant dependent on extent of residual disease.    MRI with 2.7cm on enhancement in the lateral reconstructed right breast with normal appearing axillary nodes.  Ligia will have second look U/S for magseed planning for localization and excision.    Discussed indication for oncotype.  No need at this point.      Discussed surgical intervention for right magseed localized / bracketed partial mastectomy in combination with plastics to possibly revise / downsise implant if needed.  Her axillary lymph nodes appear normal on imaging.  In light of previously negative slnb and unknown accuracy of repeat slnb in the setting of mastectomy, surgical axillary staging is not recommended.    Ligia will be scheduled for surgery with me and Dr. Scruggs on 12/20/2024.    Subjective   Ligia Franklin is a 29 y.o. female presents today for follow up carcinoma of the right breast.     Treatment history  Surgery: 3/31/2023: right SSM right slnb (0/3) DCIS only spanning 5cm on path. Margins negative. Focal 1mm anterior margin. Reviewed at tumor board- no re-excision recommended.   left SSM: benign findings  Radiation: NA  Systemic therapy: NA    Has since met with Dr. Trevino to discuss eventual risk reducing BSO at 40-45 or after childbearing is complete.    Felt her implant shifted overnight but then returned to normal position.   Possibly considering going smaller but hesitant to undergo more surgery.    She's had nipple tattooing with [employer].    Underwent bilateral breast reconstruction revision with down sizing of the  implants.  During the procedure Dr. Scruggs excised scar, skin, subcutaneous tissue down to and including capsule of both reconstructed breasts.  The excised tissue was sent to pathology.    On the right, there was a 5mm focus of invasive cancer on 1 slide.  The focus involved fibrous / fibroadipose tissue.  Because the specimen was unoriented, margin status cannot be assessed.    Ligia's case was presented at multi-disciplinary tumor board last week.     A breast MRI was performed 12/9/2024 demonstrating a focal area of enhancement measuring 2.7cm in the lower outer quadrant of the breast adjacent to implant and capsule. Lymph nodes WNL bilaterally.  No other areas of enhancement.    Review of Systems   Constitutional symptoms: Denies generalized fatigue.  Denies weight change, fevers/chills, difficulty sleeping   Eyes: Denies double vision, glaucoma, cataracts.  Ear/nose/throat/mouth: Denies hearing changes, sore throat, sinus problems.  Cardiovascular: No chest pain.  Denies irregular heartbeat.  Denies ankle swelling.  Respiratory: No wheezing, cough, or shortness of breath.  Gastrointestinal: No abdominal pain,  No nausea/vomiting.  No indigestion/heartburn.  No change in bowel habits.  No constipation or diarrhea.   Genitourinary: No urinary incontinence.  No urinary frequency.  No painful urination.  Musculoskeletal: No bone pain, no muscle pain, no joint pain.   Integumentary: No rash. No masses.  No changes in moles.  No easy bruising.  Neurological: No headaches.  No tremors. No numbness/tingling.  Psychiatric: No anxiety. No depression.  Endocrine: No excessive thirst.  Not too hot or too cold.  Not tired or fatigued.    Hematological/lymphatic: No swollen glands or blood clotting problems.  No  bruising.    Objective   Physical Exam  General: Alert and oriented x 3.  Mood and affect are appropriate.  HEENT: EOMI, PERRLA.   Neck: supple, no masses, no cervical adenopathy.  Cardiovascular: no lower extremity edema.  Pulmonary: breathing non labored on room air.  GI: Abdomen soft, no masses. No hepatomegaly or splenomegaly.  Lymph nodes: No supraclavicular or axillary adenopathy bilaterally.  Musculoskeletal: Full range of motion in the upper extremities bilaterally.  Neuro: denies dizziness, tremors    Breasts: The breasts were examined in both the seated and supine positions.    RIGHT: surgically absent with implant based reconstruction. There are no skin changes, skin thickening, or dimpling. There are no masses palpated in the RIGHT breast. I cannot palpate any skin changes or mass in the lateral reconstructed breast.  LEFT: surgically absent with implant based reconstruction. There are no skin changes, skin thickening, or dimpling. There are no masses palpated in the LEFT breast.       Radiology review: All images and reports were personally reviewed.         Addis العلي, DO

## 2024-12-05 ENCOUNTER — APPOINTMENT (OUTPATIENT)
Dept: HEMATOLOGY/ONCOLOGY | Facility: HOSPITAL | Age: 29
End: 2024-12-05
Payer: COMMERCIAL

## 2024-12-05 DIAGNOSIS — R33.8 ACUTE URINARY RETENTION: ICD-10-CM

## 2024-12-05 NOTE — TUMOR BOARD NOTE
MULTIDISCIPLINARY BREAST CANCER TUMOR BOARD CONFERENCE NOTE  Ligia Franklin was presented at Breast Cancer Tumor Board Conference  Conference date: 12/5/2024  Presenting Provider(s): Dr. Addis العلي   Present at Conference: Medical Oncology, Radiation Oncology, Surgical Oncology, Radiology, and Pathology Representatives  Conference Review Type: Pathology Review    National Guidelines discussed: Yes    Surgical Resection: Surgery is not complete. Proceed with MRI   Radiation therapy: Referral  Genomic Testing: no    Systemic therapy: Referral  Clinical Trial Eligible: no    Genetics: positive for the BRCA2 mutation     Referral Recommendations:  Radiation Oncology and Medical Oncology    Cancer Staging:  Cancer Staging   No matching staging information was found for the patient.       Disclaimer  S[facility] tumor board recommendations represent the consensus opinion of physicians present at a weekly patient care conference. The treating [facility] physician is not always present, and many of the physicians formulating the recommendation have not personally seen or examined the patient under discussion. It is understood that the treating [facility] physician considers the expertise of the Tumor Board Recommendation in formulating his/her plan for the patient. However, in many situations, based on individualized patient considerations, a different plan is determined by the treating physician to be the optimal medical management.    Scribe Attestation  By signing my name below, Betty RUST Scribe   attest that this documentation has been prepared under the direction and in the presence of BREAST TUMOR BOARD.     Clear

## 2024-12-06 DIAGNOSIS — Z17.0 MALIGNANT NEOPLASM OF RIGHT BREAST IN FEMALE, ESTROGEN RECEPTOR POSITIVE, UNSPECIFIED SITE OF BREAST: ICD-10-CM

## 2024-12-06 DIAGNOSIS — C50.911 MALIGNANT NEOPLASM OF RIGHT BREAST IN FEMALE, ESTROGEN RECEPTOR POSITIVE, UNSPECIFIED SITE OF BREAST: ICD-10-CM

## 2024-12-06 RX ORDER — OXYBUTYNIN CHLORIDE 5 MG/1
5 TABLET, EXTENDED RELEASE ORAL DAILY
Qty: 90 TABLET | Refills: 1 | OUTPATIENT
Start: 2024-12-06 | End: 2025-01-05

## 2024-12-09 ENCOUNTER — HOSPITAL ENCOUNTER (OUTPATIENT)
Dept: RADIOLOGY | Facility: HOSPITAL | Age: 29
Discharge: HOME | End: 2024-12-09
Payer: COMMERCIAL

## 2024-12-09 DIAGNOSIS — C50.911 MALIGNANT NEOPLASM OF RIGHT BREAST IN FEMALE, ESTROGEN RECEPTOR POSITIVE, UNSPECIFIED SITE OF BREAST: ICD-10-CM

## 2024-12-09 DIAGNOSIS — Z17.0 MALIGNANT NEOPLASM OF RIGHT BREAST IN FEMALE, ESTROGEN RECEPTOR POSITIVE, UNSPECIFIED SITE OF BREAST: ICD-10-CM

## 2024-12-09 PROCEDURE — A9575 INJ GADOTERATE MEGLUMI 0.1ML: HCPCS | Performed by: SURGERY

## 2024-12-09 PROCEDURE — 77049 MRI BREAST C-+ W/CAD BI: CPT | Performed by: RADIOLOGY

## 2024-12-09 PROCEDURE — 2550000001 HC RX 255 CONTRASTS: Performed by: SURGERY

## 2024-12-09 PROCEDURE — 77049 MRI BREAST C-+ W/CAD BI: CPT

## 2024-12-09 RX ORDER — GADOTERATE MEGLUMINE 376.9 MG/ML
14 INJECTION INTRAVENOUS
Status: COMPLETED | OUTPATIENT
Start: 2024-12-09 | End: 2024-12-09

## 2024-12-10 ENCOUNTER — HOSPITAL ENCOUNTER (OUTPATIENT)
Dept: RADIOLOGY | Facility: HOSPITAL | Age: 29
Discharge: HOME | End: 2024-12-10
Payer: COMMERCIAL

## 2024-12-10 ENCOUNTER — OFFICE VISIT (OUTPATIENT)
Dept: SURGICAL ONCOLOGY | Facility: CLINIC | Age: 29
End: 2024-12-10
Payer: COMMERCIAL

## 2024-12-10 ENCOUNTER — PREP FOR PROCEDURE (OUTPATIENT)
Dept: SURGICAL ONCOLOGY | Facility: HOSPITAL | Age: 29
End: 2024-12-10

## 2024-12-10 DIAGNOSIS — R92.8 ABNORMAL FINDING ON BREAST IMAGING: ICD-10-CM

## 2024-12-10 DIAGNOSIS — C50.511 MALIGNANT NEOPLASM OF LOWER-OUTER QUADRANT OF RIGHT BREAST OF FEMALE, ESTROGEN RECEPTOR POSITIVE: ICD-10-CM

## 2024-12-10 DIAGNOSIS — Z17.0 MALIGNANT NEOPLASM OF LOWER-OUTER QUADRANT OF RIGHT BREAST OF FEMALE, ESTROGEN RECEPTOR POSITIVE: ICD-10-CM

## 2024-12-10 PROCEDURE — 2780000003 HC OR 278 NO HCPCS

## 2024-12-10 PROCEDURE — 19286 PERQ DEV BREAST ADD US IMAG: CPT | Mod: RT

## 2024-12-10 PROCEDURE — 99215 OFFICE O/P EST HI 40 MIN: CPT | Performed by: SURGERY

## 2024-12-10 PROCEDURE — 76982 USE 1ST TARGET LESION: CPT | Mod: RT

## 2024-12-10 PROCEDURE — A4648 IMPLANTABLE TISSUE MARKER: HCPCS

## 2024-12-10 PROCEDURE — 19285 PERQ DEV BREAST 1ST US IMAG: CPT | Mod: RT

## 2024-12-10 PROCEDURE — 2500000004 HC RX 250 GENERAL PHARMACY W/ HCPCS (ALT 636 FOR OP/ED): Performed by: RADIOLOGY

## 2024-12-10 PROCEDURE — 76642 ULTRASOUND BREAST LIMITED: CPT | Mod: RT

## 2024-12-10 PROCEDURE — 76642 ULTRASOUND BREAST LIMITED: CPT | Mod: RIGHT SIDE | Performed by: RADIOLOGY

## 2024-12-12 ENCOUNTER — ANESTHESIA EVENT (OUTPATIENT)
Dept: OPERATING ROOM | Facility: HOSPITAL | Age: 29
End: 2024-12-12
Payer: COMMERCIAL

## 2024-12-16 ENCOUNTER — PRE-ADMISSION TESTING (OUTPATIENT)
Dept: PREADMISSION TESTING | Facility: HOSPITAL | Age: 29
End: 2024-12-16
Payer: COMMERCIAL

## 2024-12-16 VITALS
DIASTOLIC BLOOD PRESSURE: 74 MMHG | OXYGEN SATURATION: 96 % | HEIGHT: 64 IN | HEART RATE: 90 BPM | SYSTOLIC BLOOD PRESSURE: 114 MMHG | WEIGHT: 149.47 LBS | RESPIRATION RATE: 16 BRPM | TEMPERATURE: 96.8 F | BODY MASS INDEX: 25.52 KG/M2

## 2024-12-16 DIAGNOSIS — Z17.0 MALIGNANT NEOPLASM OF LOWER-OUTER QUADRANT OF RIGHT BREAST OF FEMALE, ESTROGEN RECEPTOR POSITIVE: ICD-10-CM

## 2024-12-16 DIAGNOSIS — C50.511 MALIGNANT NEOPLASM OF LOWER-OUTER QUADRANT OF RIGHT BREAST OF FEMALE, ESTROGEN RECEPTOR POSITIVE: ICD-10-CM

## 2024-12-16 DIAGNOSIS — Z01.818 PREOP EXAMINATION: Primary | ICD-10-CM

## 2024-12-16 PROCEDURE — 99202 OFFICE O/P NEW SF 15 MIN: CPT

## 2024-12-16 RX ORDER — ASCORBIC ACID 500 MG
500 TABLET ORAL DAILY
COMMUNITY

## 2024-12-16 RX ORDER — ZINC GLUCONATE 50 MG
TABLET ORAL DAILY
COMMUNITY

## 2024-12-16 ASSESSMENT — DUKE ACTIVITY SCORE INDEX (DASI)
CAN YOU DO YARD WORK LIKE RAKING LEAVES, WEEDING OR PUSHING A MOWER: YES
CAN YOU RUN A SHORT DISTANCE: YES
CAN YOU DO LIGHT WORK AROUND THE HOUSE LIKE DUSTING OR WASHING DISHES: YES
CAN YOU DO MODERATE WORK AROUND THE HOUSE LIKE VACUUMING, SWEEPING FLOORS OR CARRYING GROCERIES: YES
CAN YOU PARTICIPATE IN STRENOUS SPORTS LIKE SWIMMING, SINGLES TENNIS, FOOTBALL, BASKETBALL, OR SKIING: NO
CAN YOU HAVE SEXUAL RELATIONS: YES
CAN YOU PARTICIPATE IN MODERATE RECREATIONAL ACTIVITIES LIKE GOLF, BOWLING, DANCING, DOUBLES TENNIS OR THROWING A BASEBALL OR FOOTBALL: YES
TOTAL_SCORE: 50.7
CAN YOU WALK INDOORS, SUCH AS AROUND YOUR HOUSE: YES
CAN YOU CLIMB A FLIGHT OF STAIRS OR WALK UP A HILL: YES
CAN YOU WALK A BLOCK OR TWO ON LEVEL GROUND: YES
CAN YOU DO HEAVY WORK AROUND THE HOUSE LIKE SCRUBBING FLOORS OR LIFTING AND MOVING HEAVY FURNITURE: YES
CAN YOU TAKE CARE OF YOURSELF (EAT, DRESS, BATHE, OR USE TOILET): YES
DASI METS SCORE: 9

## 2024-12-16 ASSESSMENT — PAIN SCALES - GENERAL: PAINLEVEL_OUTOF10: 0 - NO PAIN

## 2024-12-16 ASSESSMENT — LIFESTYLE VARIABLES: SMOKING_STATUS: NONSMOKER

## 2024-12-16 ASSESSMENT — ACTIVITIES OF DAILY LIVING (ADL): ADL_SCORE: 0

## 2024-12-16 ASSESSMENT — PAIN - FUNCTIONAL ASSESSMENT: PAIN_FUNCTIONAL_ASSESSMENT: 0-10

## 2024-12-16 NOTE — CPM/PAT H&P
CPM/PAT Evaluation       Name: Ligia Franklin (Ligia Franklin)  /Age: 1995/29 y.o.     In-Person       Chief Complaint: Breast cancer     HPI  Pleasant 28 y/o female presents with malignant neoplasm of lower-outer quadrant of right breast of female, estrogen receptor positive scheduled for right mag seed bracketed partial mastectomy with immediate reconstruction on 24. She had a B/L skin sparing mastectomy with right sentinel lymph node biopsy and implant breast reconstruction in . She recently had breast reconstruction revision in . During this procedure, specimens were taken that showed residual invasive carcinoma as well as DCIS.     Past Medical History:   Diagnosis Date    Abnormal glucose complicating pregnancy ([facility]) 2022    Abnormal glucose tolerance in pregnancy    Breast cancer ([facility]) January    Breast cancer ([facility])     Delayed emergence from general anesthesia March    Encounter for screening for diabetes mellitus 2021    Encounter for screening for diabetes mellitus    Encounter for supervision of normal pregnancy, unspecified, unspecified trimester 2022    Prenatal care    Nonpurulent mastitis associated with the puerperium ([facility]) 2022    Mastitis, postpartum    PONV (postoperative nausea and vomiting) March    Unspecified disorders of lactation ([facility]) 2022    Lactation problem       Past Surgical History:   Procedure Laterality Date    BREAST BIOPSY  March    MASTECTOMY  March    OTHER SURGICAL HISTORY  2022    No history of surgery       Patient  reports being sexually active and has had partner(s) who are male. She reports using the following method of birth control/protection: I.U.D..    Family History   Problem Relation Name Age of Onset    Other (RIGHT VENTRICULAR DYSPLASIA) Mother      Cancer Father Dont know        No Known Allergies    Prior to Admission medications    Medication Sig Start Date End Date Taking?  Authorizing Provider   buPROPion XL (Wellbutrin XL) 300 mg 24 hr tablet Take 1 tablet (300 mg) by mouth once daily in the morning. Do not crush, chew, or split. 11/26/24 3/26/25  JENNIFER Ponce   copper (ParaGard T 380A) 380 square mm IUD by intrauterine route. PER DIRECTED    Historical Provider, MD   cyclobenzaprine (Flexeril) 5 mg tablet Take 1 tablet (5 mg) by mouth 3 times a day as needed for muscle spasms.  Patient not taking: Reported on 12/2/2024 11/12/24 12/10/24  Steele CARLOS Rich PA-C   oxybutynin XL (Ditropan-XL) 5 mg 24 hr tablet Take 1 tablet (5 mg) by mouth once daily. Do not crush, chew, or split.  Patient not taking: Reported on 12/2/2024 11/13/24 12/10/24  Steele CARLOS Rich PA-C        Constitutional: Negative for fever, chills, or sweats   ENMT: Negative for nasal discharge, congestion, ear pain, mouth pain, throat pain. Positive for contacts/glasses.   Respiratory: Negative for cough, wheezing, shortness of breath   Cardiac: Negative for chest pain, dyspnea on exertion, palpitations   Gastrointestinal: Negative for nausea, vomiting, diarrhea, constipation, abdominal pain  Genitourinary: Negative for dysuria, flank pain, frequency, hematuria   Musculoskeletal: Negative for decreased ROM, pain, swelling, weakness   Neurological: Negative for dizziness, confusion, headache  Psychiatric: Negative for mood changes   Skin: Negative for itching, rash, ulcer    Hematologic/Lymph: Negative for bruising, easy bleeding  Allergic/Immunologic: Negative itching, sneezing, swelling      Physical Exam  Vitals reviewed.   Constitutional:       Appearance: Normal appearance.   HENT:      Head: Normocephalic.      Mouth/Throat:      Mouth: Mucous membranes are moist.      Pharynx: Oropharynx is clear.   Eyes:      Pupils: Pupils are equal, round, and reactive to light.   Cardiovascular:      Rate and Rhythm: Normal rate and regular rhythm.      Heart sounds: Normal heart sounds.   Pulmonary:      Effort:  Pulmonary effort is normal.      Breath sounds: Normal breath sounds.   Abdominal:      General: Bowel sounds are normal.      Palpations: Abdomen is soft.   Musculoskeletal:         General: Normal range of motion.      Cervical back: Normal range of motion.   Skin:     General: Skin is warm and dry.   Neurological:      General: No focal deficit present.      Mental Status: She is alert and oriented to person, place, and time.   Psychiatric:         Mood and Affect: Mood normal.         Behavior: Behavior normal.          PAT AIRWAY:   Airway:     Mallampati::  II    Neck ROM::  Full  normal        Testing/Diagnostic:     Patient Specialist/PCP:   PCP: Rosa Maria Bruno     Visit Vitals  OB Status Unknown   Smoking Status Never       DASI Risk Score    No data to display       Caprini DVT Assessment    No data to display       Modified Frailty Index    No data to display       CHADS2 Stroke Risk  Current as of 2 days ago        N/A 3 to 100%: High Risk   2 to < 3%: Medium Risk   0 to < 2%: Low Risk     Last Change: N/A          This score determines the patient's risk of having a stroke if the patient has atrial fibrillation.        This score is not applicable to this patient. Components are not calculated.          Revised Cardiac Risk Index    No data to display       Apfel Simplified Score    No data to display       Risk Analysis Index Results This Encounter    No data found in the last 10 encounters.       Prodigy: High Risk  Total Score: 0          ARISCAT Score for Postoperative Pulmonary Complications    No data to display       Pedersen Perioperative Risk for Myocardial Infarction or Cardiac Arrest (NOHEMI)    No data to display         Assessment and Plan:     Assessment and Plan:     Preop:   OR with Dr. العلي on 12/20/24 for right mag seed bracketed partial mastectomy with immediate reconstruction  Labs on file from 11/16. Routed to Dr. العلي for her review   Urine preg ordered for DOS     Cardiac:  [facility]  Activity Status Index (DASI)  DASI Score: 50.7   MET Score: 9  RCRI  0 which is 3.9% 30 day risk of MACE (risk for cardiac death, nonfatal myocardial infarction, and nonfactal cardiac arrest)  NOHEMI score which indicates a   0% risk of intraoperative or 30-day postoperative MACE    Pulmonary:   STOP-BANG score of   0. Low  risk of obstructive sleep apnea.   ARISCAT:   0   points which is a low (1.6%) risk of in-hospital post-op pulmonary complications     GI:  Apfel: 4 points 79% risk for post operative N/V     General:   Breast cancer: Reason for upcoming procedure. See HPI    Hematologic:   Caprini score 6, patient at high risk for perioperative DVT. Patient provided with VTE education/handout.     Skin check: Patient was instructed to make surgeon aware of any skin changes/concerns prior to surgery.     Anesthesia: Has had post-op nausea/vomiting with anesthesia in the past. Delayed emergence from anesthesia in past as well-states she doesn't remember getting dressed or getting to car. Used scopolamine patch.     *See risk scores as previously documented

## 2024-12-16 NOTE — PREPROCEDURE INSTRUCTIONS
Medication List            Accurate as of December 16, 2024  9:00 AM. Always use your most recent med list.                ascorbic acid 500 mg tablet  Commonly known as: Vitamin C  Additional Medication Adjustments for Surgery: Take last dose 7 days before surgery     buPROPion  mg 24 hr tablet  Commonly known as: Wellbutrin XL  Take 1 tablet (300 mg) by mouth once daily in the morning. Do not crush, chew, or split.  Medication Adjustments for Surgery: Take/Use as prescribed     ParaGard T 380A 380 square mm IUD  Generic drug: copper     zinc gluconate 50 mg tablet  Additional Medication Adjustments for Surgery: Take last dose 7 days before surgery                              PRE-OPERATIVE INSTRUCTIONS    You will receive notification one business day prior to your procedure to confirm your arrival time. It is important that you answer your phone and/or check your messages during this time. If you do not hear from the surgery center by 5 pm. the day before your procedure, please call [phone].     Please enter the building through the Outpatient entrance and take the elevator off the lobby to the 2nd floor then check in at the Outpatient Surgery desk on the 2nd floor.    INSTRUCTIONS:  Talk to your surgeon for instructions if you should stop your aspirin, blood thinner, or diabetes medicines.  DO NOT take any multivitamins or over the counter supplements for 7-10 days before surgery.  If not being admitted, you must have an adult immediately available to drive you home after surgery. We also highly recommend you have someone stay with you for the entire day and night of your surgery.  For children having surgery, a parent or legal guardian must accompany them to the surgery center. If this is not possible, please call [phone] to make additional arrangements.  For adults who are unable to consent or make medical decisions for themselves, a legal guardian or Power of  must accompany them to  the surgery center. If this is not possible, please call [phone] to make additional arrangements.  Wear comfortable, loose fitting clothing.  All jewelry and piercings must be removed. If you are unable to remove an item or have a dermal piercing, please be sure to tell the nurse when you arrive for surgery.  Nail polish and make-up must be removed.  Avoid smoking or consuming alcohol for 24 hours before surgery.  To help prevent infection, please take a shower/bath and wash your hair the night before and/or morning of surgery (or follow other specific bathing instructions provided).    Preoperative Fasting Guidelines    Why must I stop eating and drinking near surgery time?  With sedation, food or liquid in your stomach can enter your lungs causing serious complications  Increases nausea and vomiting    When do I need to stop eating and drinking before my surgery?  Do not eat any solid food after midnight the night before your surgery/procedure unless otherwise instructed by your surgeon.   You may have up to 13.5 ounces of clear liquid until TWO hours before your instructed arrival time to the hospital.  This includes water, black tea/coffee, (no milk or cream) apple juice, and electrolyte drinks (Gatorade).   You may chew gum until TWO hours before your surgery/procedure      If applicable, notify your surgeons office immediately of any new skin changes that occur to the surgical limb.      If you have any questions or concerns, please call Pre-Admission Testing at [phone].

## 2024-12-16 NOTE — PREPROCEDURE INSTRUCTIONS
Thank you for visiting Preadmission Testing at [facility]. If you have any changes to your health condition, please call the SURGEON's office to alert them and give them details of your symptoms.        Preoperative Brain Exercises    What are brain exercises?  A brain exercise is any activity that engages your thinking (cognitive) skills.    What types of activities are considered brain exercises?  Jigsaw puzzles, crossword puzzles, word jumble, memory games, word search, and many more.  Many can be found free online or on your phone via a mobile charlene.    Why should I do brain exercises before my surgery?  More recent research has shown brain exercise before surgery can lower the risk of postoperative delirium (confusion) which can be especially important for older adults.  Patients who did brain exercises for 5 to 10 hours the days before surgery, cut their risk of postoperative delirium in half up to 1 week after surgery.      Preoperative Deep Breathing Exercises    Why it is important to do deep breathing exercises before my surgery?  Deep breathing exercises strengthen your breathing muscles.  This helps you to recover after your surgery and decreases the chance of breathing complications.    How are the deep breathing exercises done?  Sit straight with your back supported.  Breathe in deeply and slowly through your nose. Your lower rib cage should expand and your abdomen may move forward.  Hold that breath for 3 to 5 seconds.  Breathe out through pursed lips, slowly and completely.  Rest and repeat 10 times every hour while awake.  Rest longer if you become dizzy or lightheaded.      Patient and Family Education   Ways You Can Help Prevent Blood Clots     This handout explains some simple things you can do to help prevent blood clots.      Blood clots are blockages that can form in the body's veins. When a blood clot forms in your deep veins, it may be called a deep vein thrombosis, or DVT for short. Blood clots can  happen in any part of the body where blood flows, but they are most common in the arms and legs. If a piece of a blood clot breaks free and travels to the lungs, it is called a pulmonary embolus (PE). A PE can be a very serious problem.      Being in the hospital or having surgery can raise your chances of getting a blood clot because you may not be well enough to move around as much as you normally do.      Ways you can help prevent blood clots in the hospital         Wearing SCDs. SCDs stands for Sequential Compression Devices.   SCDs are special sleeves that wrap around your legs  They attach to a pump that fills them with air to gently squeeze your legs every few minutes.   This helps return the blood in your legs to your heart.   SCDs should only be taken off when walking or bathing.   SCDs may not be comfortable, but they can help save your life.               Wearing compression stockings - if your doctor orders them. These special snug fitting stockings gently squeeze your legs to help blood flow.       Walking. Walking helps move the blood in your legs.   If your doctor says it is ok, try walking the halls at least   5 times a day. Ask us to help you get up, so you don't fall.      Taking any blood thinning medicines your doctor orders.          ©[facility]; 3/23        Ways you can help prevent blood clots at home       Wearing compression stockings - if your doctor orders them. ? Walking - to help move the blood in your legs.       Taking any blood thinning medicines your doctor orders.      Signs of a blood clot or PE      Tell your doctor or nurse know right away if you have of the problems listed below.    If you are at home, seek medical care right away. Call 911 for chest pain or problems breathing.          Signs of a blood clot (DVT) - such as pain,  swelling, redness or warmth in your arm or leg      Signs of a pulmonary embolism (PE) - such as chest     pain or feeling short of breath

## 2024-12-18 ENCOUNTER — PREP FOR PROCEDURE (OUTPATIENT)
Dept: OCCUPATIONAL MEDICINE | Facility: HOSPITAL | Age: 29
End: 2024-12-18
Payer: COMMERCIAL

## 2024-12-20 ENCOUNTER — PHARMACY VISIT (OUTPATIENT)
Dept: PHARMACY | Facility: CLINIC | Age: 29
End: 2024-12-20
Payer: COMMERCIAL

## 2024-12-20 ENCOUNTER — HOSPITAL ENCOUNTER (OUTPATIENT)
Facility: HOSPITAL | Age: 29
Setting detail: OUTPATIENT SURGERY
Discharge: HOME | End: 2024-12-20
Attending: SURGERY | Admitting: SURGERY
Payer: COMMERCIAL

## 2024-12-20 ENCOUNTER — ANESTHESIA (OUTPATIENT)
Dept: OPERATING ROOM | Facility: HOSPITAL | Age: 29
End: 2024-12-20
Payer: COMMERCIAL

## 2024-12-20 ENCOUNTER — APPOINTMENT (OUTPATIENT)
Dept: RADIOLOGY | Facility: HOSPITAL | Age: 29
End: 2024-12-20
Payer: COMMERCIAL

## 2024-12-20 VITALS
DIASTOLIC BLOOD PRESSURE: 63 MMHG | HEART RATE: 99 BPM | WEIGHT: 147 LBS | HEIGHT: 64 IN | RESPIRATION RATE: 18 BRPM | TEMPERATURE: 97.3 F | BODY MASS INDEX: 25.1 KG/M2 | OXYGEN SATURATION: 95 % | SYSTOLIC BLOOD PRESSURE: 113 MMHG

## 2024-12-20 DIAGNOSIS — G89.18 POST-OP PAIN: Primary | ICD-10-CM

## 2024-12-20 DIAGNOSIS — C50.511 MALIGNANT NEOPLASM OF LOWER-OUTER QUADRANT OF RIGHT BREAST OF FEMALE, ESTROGEN RECEPTOR POSITIVE: ICD-10-CM

## 2024-12-20 DIAGNOSIS — Z17.0 MALIGNANT NEOPLASM OF LOWER-OUTER QUADRANT OF RIGHT BREAST OF FEMALE, ESTROGEN RECEPTOR POSITIVE: ICD-10-CM

## 2024-12-20 DIAGNOSIS — R92.8 ABNORMAL FINDING ON BREAST IMAGING: ICD-10-CM

## 2024-12-20 LAB — HCG UR QL IA.RAPID: NEGATIVE

## 2024-12-20 PROCEDURE — 3700000001 HC GENERAL ANESTHESIA TIME - INITIAL BASE CHARGE: Performed by: SURGERY

## 2024-12-20 PROCEDURE — 7100000010 HC PHASE TWO TIME - EACH INCREMENTAL 1 MINUTE: Performed by: SURGERY

## 2024-12-20 PROCEDURE — 2720000007 HC OR 272 NO HCPCS: Performed by: SURGERY

## 2024-12-20 PROCEDURE — 88342 IMHCHEM/IMCYTCHM 1ST ANTB: CPT | Performed by: STUDENT IN AN ORGANIZED HEALTH CARE EDUCATION/TRAINING PROGRAM

## 2024-12-20 PROCEDURE — A19380 PR REVISE BREAST RECONSTRUCTION: Performed by: STUDENT IN AN ORGANIZED HEALTH CARE EDUCATION/TRAINING PROGRAM

## 2024-12-20 PROCEDURE — 76098 X-RAY EXAM SURGICAL SPECIMEN: CPT | Performed by: SURGERY

## 2024-12-20 PROCEDURE — 76098 X-RAY EXAM SURGICAL SPECIMEN: CPT | Mod: RT

## 2024-12-20 PROCEDURE — 3600000004 HC OR TIME - INITIAL BASE CHARGE - PROCEDURE LEVEL FOUR: Performed by: SURGERY

## 2024-12-20 PROCEDURE — 2500000004 HC RX 250 GENERAL PHARMACY W/ HCPCS (ALT 636 FOR OP/ED): Performed by: ANESTHESIOLOGIST ASSISTANT

## 2024-12-20 PROCEDURE — 19301 PARTIAL MASTECTOMY: CPT | Performed by: SURGERY

## 2024-12-20 PROCEDURE — 81025 URINE PREGNANCY TEST: CPT | Performed by: SURGERY

## 2024-12-20 PROCEDURE — 3600000009 HC OR TIME - EACH INCREMENTAL 1 MINUTE - PROCEDURE LEVEL FOUR: Performed by: SURGERY

## 2024-12-20 PROCEDURE — 19380 REVJ RECONSTRUCTED BREAST: CPT | Performed by: SURGERY

## 2024-12-20 PROCEDURE — 7100000009 HC PHASE TWO TIME - INITIAL BASE CHARGE: Performed by: SURGERY

## 2024-12-20 PROCEDURE — 76098 X-RAY EXAM SURGICAL SPECIMEN: CPT | Mod: RIGHT SIDE | Performed by: RADIOLOGY

## 2024-12-20 PROCEDURE — 88307 TISSUE EXAM BY PATHOLOGIST: CPT | Performed by: STUDENT IN AN ORGANIZED HEALTH CARE EDUCATION/TRAINING PROGRAM

## 2024-12-20 PROCEDURE — 88341 IMHCHEM/IMCYTCHM EA ADD ANTB: CPT | Performed by: STUDENT IN AN ORGANIZED HEALTH CARE EDUCATION/TRAINING PROGRAM

## 2024-12-20 PROCEDURE — A19380 PR REVISE BREAST RECONSTRUCTION: Performed by: ANESTHESIOLOGIST ASSISTANT

## 2024-12-20 PROCEDURE — 7100000002 HC RECOVERY ROOM TIME - EACH INCREMENTAL 1 MINUTE: Performed by: SURGERY

## 2024-12-20 PROCEDURE — RXMED WILLOW AMBULATORY MEDICATION CHARGE

## 2024-12-20 PROCEDURE — 7100000001 HC RECOVERY ROOM TIME - INITIAL BASE CHARGE: Performed by: SURGERY

## 2024-12-20 PROCEDURE — 3700000002 HC GENERAL ANESTHESIA TIME - EACH INCREMENTAL 1 MINUTE: Performed by: SURGERY

## 2024-12-20 PROCEDURE — 88307 TISSUE EXAM BY PATHOLOGIST: CPT | Mod: TC,STJLAB | Performed by: SURGERY

## 2024-12-20 PROCEDURE — 2500000004 HC RX 250 GENERAL PHARMACY W/ HCPCS (ALT 636 FOR OP/ED): Performed by: STUDENT IN AN ORGANIZED HEALTH CARE EDUCATION/TRAINING PROGRAM

## 2024-12-20 RX ORDER — ONDANSETRON HYDROCHLORIDE 2 MG/ML
INJECTION, SOLUTION INTRAVENOUS AS NEEDED
Status: DISCONTINUED | OUTPATIENT
Start: 2024-12-20 | End: 2024-12-20

## 2024-12-20 RX ORDER — HYDROCODONE BITARTRATE AND ACETAMINOPHEN 5; 325 MG/1; MG/1
1 TABLET ORAL EVERY 6 HOURS PRN
Qty: 8 TABLET | Refills: 0 | Status: SHIPPED | OUTPATIENT
Start: 2024-12-20 | End: 2024-12-27

## 2024-12-20 RX ORDER — SODIUM CHLORIDE, SODIUM GLUCONATE, SODIUM ACETATE, POTASSIUM CHLORIDE AND MAGNESIUM CHLORIDE 30; 37; 368; 526; 502 MG/100ML; MG/100ML; MG/100ML; MG/100ML; MG/100ML
INJECTION, SOLUTION INTRAVENOUS CONTINUOUS PRN
Status: DISCONTINUED | OUTPATIENT
Start: 2024-12-20 | End: 2024-12-20

## 2024-12-20 RX ORDER — ONDANSETRON HYDROCHLORIDE 2 MG/ML
4 INJECTION, SOLUTION INTRAVENOUS ONCE AS NEEDED
Status: COMPLETED | OUTPATIENT
Start: 2024-12-20 | End: 2024-12-20

## 2024-12-20 RX ORDER — LABETALOL HYDROCHLORIDE 5 MG/ML
5 INJECTION, SOLUTION INTRAVENOUS ONCE AS NEEDED
Status: DISCONTINUED | OUTPATIENT
Start: 2024-12-20 | End: 2024-12-20 | Stop reason: HOSPADM

## 2024-12-20 RX ORDER — ONDANSETRON 4 MG/1
4 TABLET, ORALLY DISINTEGRATING ORAL EVERY 8 HOURS PRN
Qty: 9 TABLET | Refills: 0 | Status: SHIPPED | OUTPATIENT
Start: 2024-12-20

## 2024-12-20 RX ORDER — KETOROLAC TROMETHAMINE 30 MG/ML
INJECTION, SOLUTION INTRAMUSCULAR; INTRAVENOUS AS NEEDED
Status: DISCONTINUED | OUTPATIENT
Start: 2024-12-20 | End: 2024-12-20

## 2024-12-20 RX ORDER — OXYCODONE HYDROCHLORIDE 5 MG/1
5 TABLET ORAL EVERY 4 HOURS PRN
Status: DISCONTINUED | OUTPATIENT
Start: 2024-12-20 | End: 2024-12-20 | Stop reason: HOSPADM

## 2024-12-20 RX ORDER — DEXAMETHASONE SODIUM PHOSPHATE 10 MG/ML
INJECTION INTRAMUSCULAR; INTRAVENOUS AS NEEDED
Status: DISCONTINUED | OUTPATIENT
Start: 2024-12-20 | End: 2024-12-20

## 2024-12-20 RX ORDER — FENTANYL CITRATE 50 UG/ML
25 INJECTION, SOLUTION INTRAMUSCULAR; INTRAVENOUS EVERY 5 MIN PRN
Status: DISCONTINUED | OUTPATIENT
Start: 2024-12-20 | End: 2024-12-20 | Stop reason: HOSPADM

## 2024-12-20 RX ORDER — LIDOCAINE HYDROCHLORIDE 10 MG/ML
0.1 INJECTION, SOLUTION INFILTRATION; PERINEURAL ONCE
Status: DISCONTINUED | OUTPATIENT
Start: 2024-12-20 | End: 2024-12-20 | Stop reason: HOSPADM

## 2024-12-20 RX ORDER — CEFAZOLIN SODIUM 2 G/100ML
INJECTION, SOLUTION INTRAVENOUS AS NEEDED
Status: DISCONTINUED | OUTPATIENT
Start: 2024-12-20 | End: 2024-12-20

## 2024-12-20 RX ORDER — MIDAZOLAM HYDROCHLORIDE 1 MG/ML
INJECTION, SOLUTION INTRAMUSCULAR; INTRAVENOUS AS NEEDED
Status: DISCONTINUED | OUTPATIENT
Start: 2024-12-20 | End: 2024-12-20

## 2024-12-20 RX ORDER — LIDOCAINE HYDROCHLORIDE 20 MG/ML
INJECTION, SOLUTION EPIDURAL; INFILTRATION; INTRACAUDAL; PERINEURAL AS NEEDED
Status: DISCONTINUED | OUTPATIENT
Start: 2024-12-20 | End: 2024-12-20

## 2024-12-20 RX ORDER — SULFAMETHOXAZOLE AND TRIMETHOPRIM 800; 160 MG/1; MG/1
1 TABLET ORAL 2 TIMES DAILY
Qty: 28 TABLET | Refills: 0 | Status: SHIPPED | OUTPATIENT
Start: 2024-12-20 | End: 2025-01-03

## 2024-12-20 RX ORDER — CEPHALEXIN 500 MG/1
500 CAPSULE ORAL 3 TIMES DAILY
Qty: 30 CAPSULE | Refills: 0 | Status: SHIPPED | OUTPATIENT
Start: 2024-12-20 | End: 2024-12-30

## 2024-12-20 RX ORDER — DOCUSATE SODIUM 100 MG/1
100 CAPSULE, LIQUID FILLED ORAL 2 TIMES DAILY PRN
Qty: 10 CAPSULE | Refills: 0 | Status: SHIPPED | OUTPATIENT
Start: 2024-12-20

## 2024-12-20 RX ORDER — SODIUM CHLORIDE, SODIUM LACTATE, POTASSIUM CHLORIDE, CALCIUM CHLORIDE 600; 310; 30; 20 MG/100ML; MG/100ML; MG/100ML; MG/100ML
100 INJECTION, SOLUTION INTRAVENOUS CONTINUOUS
Status: ACTIVE | OUTPATIENT
Start: 2024-12-20 | End: 2024-12-20

## 2024-12-20 RX ORDER — ALBUTEROL SULFATE 0.83 MG/ML
2.5 SOLUTION RESPIRATORY (INHALATION) ONCE AS NEEDED
Status: DISCONTINUED | OUTPATIENT
Start: 2024-12-20 | End: 2024-12-20 | Stop reason: HOSPADM

## 2024-12-20 RX ORDER — FENTANYL CITRATE 50 UG/ML
INJECTION, SOLUTION INTRAMUSCULAR; INTRAVENOUS AS NEEDED
Status: DISCONTINUED | OUTPATIENT
Start: 2024-12-20 | End: 2024-12-20

## 2024-12-20 RX ORDER — MIDAZOLAM HYDROCHLORIDE 1 MG/ML
1 INJECTION, SOLUTION INTRAMUSCULAR; INTRAVENOUS ONCE AS NEEDED
Status: DISCONTINUED | OUTPATIENT
Start: 2024-12-20 | End: 2024-12-20 | Stop reason: HOSPADM

## 2024-12-20 RX ORDER — PROPOFOL 10 MG/ML
INJECTION, EMULSION INTRAVENOUS CONTINUOUS PRN
Status: DISCONTINUED | OUTPATIENT
Start: 2024-12-20 | End: 2024-12-20

## 2024-12-20 RX ORDER — SCOPOLAMINE 1 MG/3D
PATCH, EXTENDED RELEASE TRANSDERMAL AS NEEDED
Status: DISCONTINUED | OUTPATIENT
Start: 2024-12-20 | End: 2024-12-20

## 2024-12-20 RX ORDER — HYDROMORPHONE HYDROCHLORIDE 1 MG/ML
INJECTION, SOLUTION INTRAMUSCULAR; INTRAVENOUS; SUBCUTANEOUS AS NEEDED
Status: DISCONTINUED | OUTPATIENT
Start: 2024-12-20 | End: 2024-12-20

## 2024-12-20 RX ORDER — MEPERIDINE HYDROCHLORIDE 50 MG/ML
12.5 INJECTION INTRAMUSCULAR; INTRAVENOUS; SUBCUTANEOUS EVERY 10 MIN PRN
Status: DISCONTINUED | OUTPATIENT
Start: 2024-12-20 | End: 2024-12-20 | Stop reason: HOSPADM

## 2024-12-20 RX ADMIN — ONDANSETRON 4 MG: 2 INJECTION INTRAMUSCULAR; INTRAVENOUS at 16:42

## 2024-12-20 SDOH — HEALTH STABILITY: MENTAL HEALTH: CURRENT SMOKER: 0

## 2024-12-20 ASSESSMENT — PAIN SCALES - GENERAL
PAINLEVEL_OUTOF10: 0 - NO PAIN
PAINLEVEL_OUTOF10: 2
PAINLEVEL_OUTOF10: 0 - NO PAIN
PAINLEVEL_OUTOF10: 0 - NO PAIN

## 2024-12-20 ASSESSMENT — PAIN - FUNCTIONAL ASSESSMENT
PAIN_FUNCTIONAL_ASSESSMENT: 0-10

## 2024-12-20 NOTE — BRIEF OP NOTE
Date: 2024  OR Location: [facility]    Name: Ligia Franklin, : 1995, Age: 29 y.o., MRN: [id number], Sex: female    Diagnosis  Pre-op Diagnosis      * Malignant neoplasm of lower-outer quadrant of right breast of female, estrogen receptor positive [C50.511, Z17.0] Post-op Diagnosis     * Malignant neoplasm of lower-outer quadrant of right breast of female, estrogen receptor positive [C50.511, Z17.0]     Procedures  RIGHT MAG SEED BRACKETED PARTIAL MASTECTOMY  78349 - [place] MASTECTOMY PARTIAL    Breast Mastectomy w/Immediate Reconstruction Uni  07185 - [place] INSERTION BREAST IMPLANT SAME DAY OF MASTECTOMY      Surgeons   Panel 1:     * Addis العلي - Primary  Panel 2:     * Jim Scruggs - Primary    Resident/Fellow/Other Assistant:  Surgeons and Role:  Panel 2:     * Luz Rich PA-C - Assisting    Staff:   Scrub Person: Jin  Scrub Person: Susana Morrison Circulator: Thania    Anesthesia Staff: Anesthesiologist: Eddie Nicolas DO  C-AA: MARK Espinoza    Procedure Summary  Anesthesia: Anesthesia type not filed in the log.  ASA: II  Estimated Blood Loss: 1 mL  Intra-op Medications:   Administrations occurring from 1410 to 1615 on 24:   Medication Name Total Dose   ceFAZolin (Ancef) IV 2 g in 100 mL dextrose (iso) - premix 2 g   dexAMETHasone (Decadron) PF injection 10 mg/mL 10 mg   electrolyte-A (Plasmalyte-A) Cannot be calculated   fentaNYL (Sublimaze) injection 50 mcg/mL 100 mcg   lidocaine PF (Xylocaine-MPF) local injection 2 % 100 mg   midazolam (Versed) injection 1 mg/mL 2 mg   propofol (Diprivan) injection 10 mg/mL 536.84 mg   scopolamine (Transderm-Scop) 1 mg/3 days patch 1 patch              Anesthesia Record               Intraprocedure I/O Totals       None           Specimen:   ID Type Source Tests Collected by Time   1 : RIGHT MAGSEED BRACKETED SHORT STITCH SUPERIOR LONG STITCH LATERAL Tissue BREAST LUMPECTOMY RIGHT SURGICAL PATHOLOGY EXAM Addis العلي DO 2024  1209   2 : RIGHT INFERIOR breast margin ink marks new margin Tissue BREAST MARGIN RIGHT SURGICAL PATHOLOGY EXAM Addis العلي, DO 12/20/2024 1209   3 : RIGHT LATERAL breast margin ink marks new margin Tissue BREAST MARGIN RIGHT SURGICAL PATHOLOGY EXAM Addis العلي, DO 12/20/2024 1209   4 : RIGHT SUPERIOR breast margin ink marks new margin Tissue BREAST MARGIN RIGHT SURGICAL PATHOLOGY EXAM Addis العلي, DO 12/20/2024 1209                  Findings: right partial capsulectomy was performed using magseed localization.     Complications:  None; patient tolerated the procedure well.     Disposition: PACU - hemodynamically stable.  Condition: stable  Specimens Collected:   ID Type Source Tests Collected by Time   1 : RIGHT MAGSEED BRACKETED SHORT STITCH SUPERIOR LONG STITCH LATERAL Tissue BREAST LUMPECTOMY RIGHT SURGICAL PATHOLOGY EXAM Addis العلي, DO 12/20/2024 1209   2 : RIGHT INFERIOR breast margin ink marks new margin Tissue BREAST MARGIN RIGHT SURGICAL PATHOLOGY EXAM Addis العلي, DO 12/20/2024 1209   3 : RIGHT LATERAL breast margin ink marks new margin Tissue BREAST MARGIN RIGHT SURGICAL PATHOLOGY EXAM Addis العلي, DO 12/20/2024 1209   4 : RIGHT SUPERIOR breast margin ink marks new margin Tissue BREAST MARGIN RIGHT SURGICAL PATHOLOGY EXAM Addis العلي, DO 12/20/2024 1209     Attending Attestation: I was present and scrubbed for the entire procedure.    Addis العلي  Phone Number: [phone]

## 2024-12-20 NOTE — OP NOTE
Date: 2024  OR Location: [facility] OR    Name: Ligia Franklin, : 1995, Age: 29 y.o., MRN: [id number], Sex: female    Diagnosis  Pre-op Diagnosis      * Malignant neoplasm of lower-outer quadrant of right breast of female, estrogen receptor positive [C50.511, Z17.0] Post-op Diagnosis     * Malignant neoplasm of lower-outer quadrant of right breast of female, estrogen receptor positive [C50.511, Z17.0]     Procedures  RIGHT MAG SEED BRACKETED PARTIAL MASTECTOMY  29747 - [place] MASTECTOMY PARTIAL    Breast Mastectomy w/Immediate Reconstruction Uni  95144 - [place] INSERTION BREAST IMPLANT SAME DAY OF MASTECTOMY      Surgeons   Panel 1:     * Addis العلي - Primary  Panel 2:     * Jim Scruggs - Primary    Resident/Fellow/Other Assistant:  Surgeons and Role:  Panel 2:     * Luz Rich PA-C - Assisting    Procedure Summary  Anesthesia: General  ASA: II  Anesthesia Staff: Anesthesiologist: DO JASSI De Jesus-AA: MARK Espinoza  Estimated Blood Loss: 3mL    Staff:   Relief Circulator: Thania Bautista RN  Scrub Person: Susana Velarde; Jin Delgadillo    Details of Procedure:    The patient underwent pre-operative magnetic seed localization in the radiology department prior to surgery.  Localization studies were reviewed confirming appropriate localization.  The patient was then transported to the operative suite. A sign-in was performed verifying patient identity, procedure and laterality.  One dose of preoperative antibiotics was given.  After induction of anesthesia, the bilateral breasts and axillae were prepped and draped in the usual sterile fashion.   Just prior to incision, a time-out was performed confirming patient identity, procedure and laterality.  An elliptial incision was made, and flaps were raised in the direction of the targeted lesions.  The target lesions were then circumferentially dissected using electrocautery.  Circumferential dissection was carried out to include the targeted  lesions and localization seeds.  Dissection was from skin to the capsule. The probe was used to again confirm the presence of the localization seeds within the specimen. Once the specimen was completed dissected it was oriented with a silk suture- short suture superior, long suture lateral and passed off the field.   The Vector ink kit was used to ink the specimen: red = superior, blue = inferior, yellow = medial, orange = lateral, green = anterior, and black = posterior.  The specimen was sent to radiology for specimen xray. I reviewed the specimen radiograph myself confirming removal of the Magseeds, biopsy clips, and lesions. This image is saved in PACS for imaging documented removal of the targeted lesions. Three additional cavity margins were taken: superior, inferior, and lateral with black ink denoting the new margin.  The posterior extent of dissection included the capsule overlying the implant. Next, meticulous hemostasis was achieved. The case was then turned over to plastics which will be dictated as a separate report.    Task Performed by RNFA or Surgical Assistant: NA, resident assist      SPECIMENS:    2. Right magseed bracketed partial mastectomy: short suture superior, long lateral  3. New superior margin: black ink marks new margin  4. New inferior margin: black ink marks new margin  5. New medial margin: black ink marks new margin  6. New lateral margin: black ink marks new margin  7. New anterior margin: black ink marks new margin  8. New posterior margin: black ink marks new margin      Addis العلي, DO

## 2024-12-20 NOTE — DISCHARGE INSTRUCTIONS
Plastic Surgery Breast Reconstruction                Post Operative Instructions    Office Phone: [phone] or contact central scheduling  After-Hours Patient line: [phone]  (Use this number for medical questions/concerns only after 4pm or on the weekends)      Activity:   -avoid activities that use your chest muscles such as pushing and pulling for 3 weeks after surgery.   -no lifting greater than 10lbs for 2 weeks after surgery  -ok for walking and ambulating    Wound/Dressing Care:   -You will have a surgical bra, wear this daily. Ok to remove to shower and then replace.   -You will have surgical drains. Strip, empty, and record output 2-3 times daily. Bring record log of drain output with you to your follow up appointment in 1 week   -your incisions will have purple surgical glue, this is ok to get wet. You may shower beginning the day after surgery. We recommend warm/hot showers 1-2 times daily. Ok for soap and water.     Pain:   Pain and discomfort is expected after any surgery, but most patients do well with Tylenol (acetaminophen) 650 mg every 6 hours in addition to Motrin or Advil (ibuprofen) 600 mg every 6 hours. These can be staggered so you're alternating the medications every 3 hours. Do not take ibuprofen if you have kidney disease or have previously been told to avoid NSAIDs. You may also have a prescription for a stronger opioid-based pain medication at discharge, which is only for breakthrough pain. You should take Colace 100 mg capsules (available at any local drug store) twice a day while taking any opioid pain medication. Constipation can be alleviated with over-the-counter laxatives (such as Milk of Magnesia) until the problem has resolved. If you take any anticoagulation or antiplatelet medication, this medication can be resumed 48 hours after surgery unless you were informed otherwise.

## 2024-12-20 NOTE — OP NOTE
Plastic Surgery Operative Note       Date: 2024  OR Location: [facility] OR    Name: Ligia Franklin, : 1995, Age: 29 y.o., MRN: [id number], Sex: female    Diagnosis  Pre-op Diagnosis      * Malignant neoplasm of lower-outer quadrant of right breast of female, estrogen receptor positive [C50.511, Z17.0] Post-op Diagnosis     * Malignant neoplasm of lower-outer quadrant of right breast of female, estrogen receptor positive [C50.511, Z17.0]     Procedures  1.  Right breast capsulotomy  2.  Right breast  reconstruction revision      Surgeons   Panel 1:     * Addis العلي - Primary  Panel 2:     * Jim Scruggs - Primary    Resident/Fellow/Other Assistant:  Surgeons and Role:  Panel 2:     * Luz Brito PA-C - Assisting  Given the inherent complexity of this surgery, a second surgeon or a surgically skilled physician assistant was necessary for the successful completion of this entire operative procedure. Luz Brito served as the surgical assistant and was present for the entire operative procedure and participated in all aspects of patient care. This included transporting the patient to the operating room and entering room with the patient, assisting with preoperative positioning, first assisting throughout the entire surgical procedure by functioning as a second assistant surgeon, assisting with surgical closure, and finally accompanying the patient to recovery.      Staff:   Scrub Person: Jin  Scrub Person: Susana Morrison Circulator: Thania    Anesthesia Staff: Anesthesiologist: DO COLLIN De JesusAA: MARK Espinoza    Procedure Summary  Anesthesia: Anesthesia type not filed in the log.  ASA: II  Estimated Blood Loss: 5mL  Intra-op Medications:   Administrations occurring from 1410 to 1615 on 24:   Medication Name Total Dose   ceFAZolin (Ancef) IV 2 g in 100 mL dextrose (iso) - premix 2 g   dexAMETHasone (Decadron) PF injection 10 mg/mL 10 mg   electrolyte-A (Plasmalyte-A)  Cannot be calculated   fentaNYL (Sublimaze) injection 50 mcg/mL 100 mcg   lidocaine PF (Xylocaine-MPF) local injection 2 % 100 mg   midazolam (Versed) injection 1 mg/mL 2 mg   propofol (Diprivan) injection 10 mg/mL 536.84 mg   scopolamine (Transderm-Scop) 1 mg/3 days patch 1 patch              Anesthesia Record               Intraprocedure I/O Totals       None           Specimen:   ID Type Source Tests Collected by Time   1 : RIGHT MAGSEED BRACKETED SHORT STITCH SUPERIOR LONG STITCH LATERAL Tissue BREAST LUMPECTOMY RIGHT SURGICAL PATHOLOGY EXAM Addis العلي, DO 12/20/2024 1209   2 : RIGHT INFERIOR breast margin ink marks new margin Tissue BREAST MARGIN RIGHT SURGICAL PATHOLOGY EXAM Addis العلي, DO 12/20/2024 1209   3 : RIGHT LATERAL breast margin ink marks new margin Tissue BREAST MARGIN RIGHT SURGICAL PATHOLOGY EXAM Addis العلي, DO 12/20/2024 1209   4 : RIGHT SUPERIOR breast margin ink marks new margin Tissue BREAST MARGIN RIGHT SURGICAL PATHOLOGY EXAM Addis العلي, DO 12/20/2024 1209                 Drains and/or Catheters: * None in log *    Tourniquet Times:         Implants:     Findings: As expected, exposed implant, elliptical defect of skin    Indications: Ligia Franklin is an 29 y.o. female who is having surgery for Malignant neoplasm of lower-outer quadrant of right breast of female, estrogen receptor positive [C50.511, Z17.0]. Ligia is a 29 y.o. female who presents for postoperative visit status post  bilateral breast reconstruction revision, with removal of 490 cc structured saline implants, placement of 370 cc structured saline implants, with bilateral fat grafting and bilateral axillary dogear removal performed on 11/12/2024   Previously she is status post bilateral skin sparing mastectomy with the right sentinel node biopsy and immediate direct to implant breast reconstruction on 3/31/2023.     Unfortunately the specimens from 11/12/2024 surgery, right side specimen showed  positive residual invasive carcinoma, as well as DCIS.  She returns to the operating today for margin excision with Dr. العلي and reconstruction revision.    INFORMED CONSENT  Patient was told the risks, benefits, INDICATIONS, contraindications, and alternatives to the procedure. Risks include but not limited to pain, infection, bleeding, hematoma, seroma, injury to neurovascular and tendinous structures, dissatisfaction cosmetically, asymmetry, and need for subsequent surgeries.      The patient demonstrated general endotracheal anesthesia of these risks and agreed to proceed with surgery.  Advance directives discussed.  Team approach explained.       The patient consented and wished to proceed with the procedure and for medical photography if needed.     PROCEDURAL PAUSE  Prior to the beginning of the procedure, the patient's correct identity, side, site, and procedure to be performed were verified.  The patient was given intravenous antibiotics prior to skin incision.     PROCEDURAL NOTE  Ligia was brought to the operative theater at which point She was transferred to the operating room table.  All bony prominences were well padded, and sequential compression devices were placed to each lower extremity. Patient was then secured with safety straps.  The patient was then placed under IV sedation, and our anesthesia colleagues administered general endotracheal anesthesia.     bilateral breast were prepped and draped in a standard sterile fashion.  Dr. العلي began and performed the lips as well as removal of capsule, please see her note for full operative details.    We into the operative theater and identified approximately 6 x 4 cm open wound with exposed implant.  We performed capsulotomy, we advanced the previously placed a TIGR matrix mesh to fill the defect of the capsule.  After this was completed we circumferentially undermined a distance equal to the width of the open wound, and advanced the breast  tissue to perform tension-free closure.  This was performed for approximately 6.25 cm.  We irrigated with Betadine, followed by Irrisept, and performed closure with 2-0 PDS followed by running 3-0 strata fix in the deep dermis which is turned around the subcuticular layer.  Dermabond and Prineo were then placed over the incision.  This was followed by ABDs and surgical bra.    the patient tolerated the procedure well and was awakened from anesthesia without any difficulties, she was transferred to the PACU in stable condition, all needle counts were correct.     POST OP PLAN:  Ligia will remain outpatient.  She is prescribed pain medicine as well as antibiotic.  Will see her back in follow-up in 1 to 2 weeks      Jim Scruggs MD

## 2024-12-20 NOTE — ANESTHESIA PREPROCEDURE EVALUATION
Patient: Ligia Franklin    Procedure Information       Date/Time: 12/20/24 1410    Procedures:       RIGHT MAG SEED BRACKETED PARTIAL MASTECTOMY (Right)      Breast Mastectomy w/Immediate Reconstruction Uni (Bilateral) - possible bilateral implant exchange    Location: [facility] OR 07 / [facility]    Surgeons: Addis العلي DO; Jim Scruggs MD          Vitals:    12/20/24 1148   BP: 129/82   Pulse: 98   Resp: 17   Temp: 37 °C (98.6 °F)       Past Surgical History:   Procedure Laterality Date    BREAST BIOPSY  March    MASTECTOMY Bilateral March     Past Medical History:   Diagnosis Date    Abnormal glucose complicating pregnancy ([facility]) 03/21/2022    Abnormal glucose tolerance in pregnancy    Breast cancer ([facility]) January    Delayed emergence from general anesthesia March    Encounter for screening for diabetes mellitus 07/02/2021    Encounter for screening for diabetes mellitus    Encounter for supervision of normal pregnancy, unspecified, unspecified trimester 03/21/2022    Prenatal care    Nonpurulent mastitis associated with the puerperium ([facility]) 03/21/2022    Mastitis, postpartum    PONV (postoperative nausea and vomiting) March    Unspecified disorders of lactation ([facility]) 03/21/2022    Lactation problem     No current facility-administered medications for this encounter.  Prior to Admission medications    Medication Sig Start Date End Date Taking? Authorizing Provider   ascorbic acid (Vitamin C) 500 mg tablet Take 1 tablet (500 mg) by mouth once daily.   Yes Historical Provider, MD   buPROPion XL (Wellbutrin XL) 300 mg 24 hr tablet Take 1 tablet (300 mg) by mouth once daily in the morning. Do not crush, chew, or split. 11/26/24 3/26/25 Yes JOEY Ponce-CNP   copper (ParaGard T 380A) 380 square mm IUD by intrauterine route. PER DIRECTED   Yes Historical Provider, MD   zinc gluconate 50 mg tablet Take by mouth once daily.   Yes Historical Provider, MD     No Known Allergies  Social History  "    Tobacco Use    Smoking status: Never    Smokeless tobacco: Never   Substance Use Topics    Alcohol use: Never         Chemistry    Lab Results   Component Value Date/Time     11/16/2024 0852    K 4.3 11/16/2024 0852     (H) 11/16/2024 0852    CO2 27 11/16/2024 0852    BUN 13 11/16/2024 0852    CREATININE 0.98 11/16/2024 0852    Lab Results   Component Value Date/Time    CALCIUM 8.6 11/16/2024 0852    ALKPHOS 74 11/16/2024 0852    AST 17 11/16/2024 0852    ALT 18 11/16/2024 0852    BILITOT 0.2 11/16/2024 0852          Lab Results   Component Value Date/Time    WBC 8.9 11/16/2024 0852    HGB 11.5 (L) 11/16/2024 0852    HCT 36.4 11/16/2024 0852     11/16/2024 0852     No results found for: \"PROTIME\", \"PTT\", \"INR\"  No results found for this or any previous visit (from the past 4464 hours).   Relevant Problems   Anesthesia   (+) PONV (postoperative nausea and vomiting)      Neuro   (+) Depression      GYN   (+) Malignant neoplasm of female breast       Clinical information reviewed:   Tobacco  Allergies  Meds   Med Hx  Surg Hx  OB Status  Fam Hx  Soc   Hx        NPO Detail:  NPO/Void Status  Carbohydrate Drink Given Prior to Surgery? : N  Date of Last Liquid: 12/20/24  Time of Last Liquid: 0930  Date of Last Solid: 12/19/24  Time of Last Solid: 2100  Last Intake Type: Clear fluids  Time of Last Void: 1100         Physical Exam    Airway  Mallampati: II  TM distance: >3 FB     Cardiovascular - normal exam  Rhythm: regular  Rate: normal     Dental - normal exam     Pulmonary - normal exam     Abdominal - normal exam  Abdomen: soft             Anesthesia Plan    History of general anesthesia?: yes  History of complications of general anesthesia?: no    ASA 2     general     The patient is not a current smoker.  Patient was previously instructed to abstain from smoking on day of procedure.  Patient did not smoke on day of procedure.  Education provided regarding risk of obstructive sleep " apnea.  intravenous induction   Postoperative administration of opioids is intended.  Anesthetic plan and risks discussed with patient.  Use of blood products discussed with patient who.    Plan discussed with CAA.

## 2024-12-23 DIAGNOSIS — B37.31 VAGINAL YEAST INFECTION: Primary | ICD-10-CM

## 2024-12-23 RX ORDER — FLUCONAZOLE 150 MG/1
150 TABLET ORAL ONCE
Qty: 1 TABLET | Refills: 0 | Status: SHIPPED | OUTPATIENT
Start: 2024-12-23 | End: 2024-12-27 | Stop reason: SDUPTHER

## 2024-12-23 ASSESSMENT — PAIN SCALES - GENERAL: PAIN_LEVEL: 0

## 2024-12-23 NOTE — ANESTHESIA POSTPROCEDURE EVALUATION
Patient: Ligia Franklin    Procedure Summary       Date: 12/20/24 Room / Location: [facility] OR 07 / [facility]    Anesthesia Start: 1420 Anesthesia Stop: 1536    Procedures:       RIGHT MAG SEED BRACKETED PARTIAL MASTECTOMY (Right)      Breast Mastectomy w/Immediate Reconstruction Uni (Right) Diagnosis:       Malignant neoplasm of lower-outer quadrant of right breast of female, estrogen receptor positive      (Malignant neoplasm of lower-outer quadrant of right breast of female, estrogen receptor positive [C50.511, Z17.0])    Surgeons: Addis العلي DO; Jim Scruggs MD Responsible Provider: Eddie Nicolas DO    Anesthesia Type: general ASA Status: 2            Anesthesia Type: general    Vitals Value Taken Time   /79 12/20/24 1630   Temp 36 °C (96.8 °F) 12/20/24 1615   Pulse 108 12/20/24 1633   Resp 15 12/20/24 1633   SpO2 98 % 12/20/24 1633   Vitals shown include unfiled device data.    Anesthesia Post Evaluation    Patient location during evaluation: PACU  Patient participation: complete - patient participated  Level of consciousness: awake  Pain score: 0  Pain management: adequate  Multimodal analgesia pain management approach  Airway patency: patent  Two or more strategies used to mitigate risk of obstructive sleep apnea  Cardiovascular status: acceptable  Respiratory status: acceptable  Hydration status: acceptable  Postoperative Nausea and Vomiting: none        No notable events documented.

## 2024-12-27 ENCOUNTER — TELEPHONE (OUTPATIENT)
Dept: SURGICAL ONCOLOGY | Facility: HOSPITAL | Age: 29
End: 2024-12-27
Payer: COMMERCIAL

## 2024-12-27 ENCOUNTER — TELEPHONE (OUTPATIENT)
Dept: GYNECOLOGIC ONCOLOGY | Facility: HOSPITAL | Age: 29
End: 2024-12-27
Payer: COMMERCIAL

## 2024-12-27 DIAGNOSIS — B37.31 VAGINAL YEAST INFECTION: ICD-10-CM

## 2024-12-27 RX ORDER — FLUCONAZOLE 150 MG/1
150 TABLET ORAL ONCE
Qty: 1 TABLET | Refills: 0 | Status: SHIPPED | OUTPATIENT
Start: 2024-12-27 | End: 2024-12-27

## 2024-12-27 RX ORDER — FLUCONAZOLE 150 MG/1
150 TABLET ORAL ONCE
Qty: 1 TABLET | Refills: 0 | Status: SHIPPED | OUTPATIENT
Start: 2024-12-27 | End: 2024-12-27 | Stop reason: SDUPTHER

## 2024-12-27 NOTE — TELEPHONE ENCOUNTER
Left detailed message regarding post-operative recovery.  Encouraged to contact the office with any concerns.

## 2024-12-27 NOTE — TELEPHONE ENCOUNTER
Phoned patient in follow up to [employer] message sent reporting vaginal itching and treatment for yeast infection.     Patient states she is currently taking 2 antibiotics following breast reconstruction surgery and has developed a yeast infection.    Patient states breast provider prescribed Diflucan 150mg on 12/23.   Patient reports slight improvement of vaginal itching following Diflucan but has not resolved.    Denies pelvic pain, vaginal odor, urinary symptoms.     Message sent to Aliyah Dodson CNP and she recommends refill of Diflucan.   Diflucan pended to Aliyah.      [employer] message sent to patient to notify that Diflucan refill sent to her pharmacy and advised patient to keep appointment with Cheli Barnett CNP as scheduled on 12/30/24.

## 2024-12-30 ENCOUNTER — OFFICE VISIT (OUTPATIENT)
Dept: GYNECOLOGIC ONCOLOGY | Facility: CLINIC | Age: 29
End: 2024-12-30
Payer: COMMERCIAL

## 2024-12-30 VITALS
RESPIRATION RATE: 16 BRPM | DIASTOLIC BLOOD PRESSURE: 73 MMHG | WEIGHT: 132.94 LBS | HEART RATE: 86 BPM | OXYGEN SATURATION: 98 % | TEMPERATURE: 98.6 F | BODY MASS INDEX: 22.82 KG/M2 | SYSTOLIC BLOOD PRESSURE: 124 MMHG

## 2024-12-30 DIAGNOSIS — Z15.01 BRCA2 POSITIVE: Primary | ICD-10-CM

## 2024-12-30 DIAGNOSIS — B37.31 YEAST INFECTION OF THE VAGINA: ICD-10-CM

## 2024-12-30 DIAGNOSIS — Z15.09 BRCA2 POSITIVE: Primary | ICD-10-CM

## 2024-12-30 DIAGNOSIS — Z01.419 ENCOUNTER FOR WELL WOMAN EXAM WITH ROUTINE GYNECOLOGICAL EXAM: ICD-10-CM

## 2024-12-30 PROCEDURE — 99214 OFFICE O/P EST MOD 30 MIN: CPT | Performed by: NURSE PRACTITIONER

## 2024-12-30 PROCEDURE — 87624 HPV HI-RISK TYP POOLED RSLT: CPT | Performed by: NURSE PRACTITIONER

## 2024-12-30 RX ORDER — FLUCONAZOLE 150 MG/1
150 TABLET ORAL
Qty: 3 TABLET | Refills: 0 | Status: SHIPPED | OUTPATIENT
Start: 2024-12-30

## 2024-12-30 ASSESSMENT — PAIN SCALES - GENERAL: PAINLEVEL_OUTOF10: 0-NO PAIN

## 2024-12-30 NOTE — PROGRESS NOTES
Patient ID: Ligia Franklin is a 29 y.o. female.  Referring Physician: No referring provider defined for this encounter.  Primary Care Provider: Rosa Maria Bruno, APRN-CNP    Subjective    27 yo with BRCA2 mutation    Interval history - Here for surveillance due to BRCA2 mutation and pap smear. She had a double mastectomy in 3/2023 for breast cancer and this was discovered when she found brown nipple discharge. She recently went in for surgical revision and a 2cm mass was found, concerning for recurrent disease. She underwent excision on  and is feeling well. Unsure what additional treatment she will need until path comes back. States periods are irregular, has Copper IUD in place. Denies any weight loss or gain, her appetite is good. Reports yeast infection symptoms since being on antibiotics after breast surgery.     Pmh - none  PSH - none    SH  She lives with her  and child and is employed as a .  Denies tobacco, alcohol or illicit drug use.    FH  Her father had cancer  Her grandmother had breast cancer  No family history of uterine, ovarian or colon cancer.     Ob/Gyn hx   1 vaginal delivery  Denies abnormal PAPs    HPI  Objective    BSA: 1.65 meters squared  /73 (BP Location: Right arm, Patient Position: Sitting, BP Cuff Size: Adult)   Pulse 86   Temp 37 °C (98.6 °F) (Temporal)   Resp 16   Wt 60.3 kg (132 lb 15 oz)   LMP 2024 Comment: hcg sent  SpO2 98%   BMI 22.82 kg/m²      Physical Exam  Constitutional:       General: She is not in acute distress.     Appearance: Normal appearance.   Cardiovascular:      Rate and Rhythm: Normal rate and regular rhythm.   Pulmonary:      Effort: Pulmonary effort is normal.      Breath sounds: Normal breath sounds.   Abdominal:      General: Bowel sounds are normal. There is no distension.   Genitourinary:     Comments: Pelvic exam: No obvious masses on bimanual or rectovaginal exam. Uterus is small and mobile. Thick, white  vaginal discharge consistent with yeast. Pap collected.      Musculoskeletal:      Right forearm: Normal.      Left forearm: Normal.      Right hand: Normal.      Left hand: Normal.      Right lower leg: Normal.      Left lower leg: Normal.      Right foot: Normal.      Left foot: Normal.         Performance Status:  Asymptomatic    Assessment/Plan     Oncology History   Malignant neoplasm of female breast   6/5/2024 Initial Diagnosis    Malignant neoplasm of female breast     12/10/2024 Cancer Staged    Staging form: Breast, AJCC 8th Edition, Clinical: Stage IB (cT1, cN0, cM0, G3, ER+, ID-, HER2-) - Signed by Addis العلي DO on 12/10/2024          Problem List Items Addressed This Visit    None  Visit Diagnoses         Codes    Encounter for well woman exam with routine gynecological exam    -  Primary Z01.419    Relevant Orders    THINPREP PAP    Yeast infection of the vagina     B37.31    Relevant Medications    fluconazole (Diflucan) 150 mg tablet            - Performed pelvic examination in office today without complications. No evidence of recurrent disease in exam.  - Pap collected, follow up results  - Rx Diflucan for vaginal yeast infection  - Follow up in 12 months or sooner if needed  - Follow up sooner for IUD removal if desired    Consider risk reducing surgery once completed childbearing, and closer to age 35-45.     JOEY Verdugo-CNP.

## 2024-12-30 NOTE — H&P (VIEW-ONLY)
Patient ID: Ligia Franklin is a 29 y.o. female.  Referring Physician: No referring provider defined for this encounter.  Primary Care Provider: Rosa Maria Bruno, APRN-CNP    Subjective    27 yo with BRCA2 mutation    Interval history - Here for surveillance due to BRCA2 mutation and pap smear. She had a double mastectomy in 3/2023 for breast cancer and this was discovered when she found brown nipple discharge. She recently went in for surgical revision and a 2cm mass was found, concerning for recurrent disease. She underwent excision on  and is feeling well. Unsure what additional treatment she will need until path comes back. States periods are irregular, has Copper IUD in place. Denies any weight loss or gain, her appetite is good. Reports yeast infection symptoms since being on antibiotics after breast surgery.     Pmh - none  PSH - none    SH  She lives with her  and child and is employed as a .  Denies tobacco, alcohol or illicit drug use.    FH  Her father had cancer  Her grandmother had breast cancer  No family history of uterine, ovarian or colon cancer.     Ob/Gyn hx   1 vaginal delivery  Denies abnormal PAPs    HPI  Objective    BSA: 1.65 meters squared  /73 (BP Location: Right arm, Patient Position: Sitting, BP Cuff Size: Adult)   Pulse 86   Temp 37 °C (98.6 °F) (Temporal)   Resp 16   Wt 60.3 kg (132 lb 15 oz)   LMP 2024 Comment: hcg sent  SpO2 98%   BMI 22.82 kg/m²      Physical Exam  Constitutional:       General: She is not in acute distress.     Appearance: Normal appearance.   Cardiovascular:      Rate and Rhythm: Normal rate and regular rhythm.   Pulmonary:      Effort: Pulmonary effort is normal.      Breath sounds: Normal breath sounds.   Abdominal:      General: Bowel sounds are normal. There is no distension.   Genitourinary:     Comments: Pelvic exam: No obvious masses on bimanual or rectovaginal exam. Uterus is small and mobile. Thick, white  vaginal discharge consistent with yeast. Pap collected.      Musculoskeletal:      Right forearm: Normal.      Left forearm: Normal.      Right hand: Normal.      Left hand: Normal.      Right lower leg: Normal.      Left lower leg: Normal.      Right foot: Normal.      Left foot: Normal.         Performance Status:  Asymptomatic    Assessment/Plan     Oncology History   Malignant neoplasm of female breast   6/5/2024 Initial Diagnosis    Malignant neoplasm of female breast     12/10/2024 Cancer Staged    Staging form: Breast, AJCC 8th Edition, Clinical: Stage IB (cT1, cN0, cM0, G3, ER+, MA-, HER2-) - Signed by Addis العلي DO on 12/10/2024          Problem List Items Addressed This Visit    None  Visit Diagnoses         Codes    Encounter for well woman exam with routine gynecological exam    -  Primary Z01.419    Relevant Orders    THINPREP PAP    Yeast infection of the vagina     B37.31    Relevant Medications    fluconazole (Diflucan) 150 mg tablet            - Performed pelvic examination in office today without complications. No evidence of recurrent disease in exam.  - Pap collected, follow up results  - Rx Diflucan for vaginal yeast infection  - Follow up in 12 months or sooner if needed  - Follow up sooner for IUD removal if desired    Consider risk reducing surgery once completed childbearing, and closer to age 35-45.     JOEY Verdugo-CNP.

## 2025-01-03 LAB
LAB AP ASR DISCLAIMER: NORMAL
LAB AP BLOCK FOR ADDITIONAL STUDIES: NORMAL
LABORATORY COMMENT REPORT: NORMAL
PATH REPORT.COMMENTS IMP SPEC: NORMAL
PATH REPORT.FINAL DX SPEC: NORMAL
PATH REPORT.GROSS SPEC: NORMAL
PATH REPORT.RELEVANT HX SPEC: NORMAL
PATH REPORT.TOTAL CANCER: NORMAL
PATHOLOGY SYNOPTIC REPORT: NORMAL

## 2025-01-06 ENCOUNTER — TELEPHONE (OUTPATIENT)
Dept: SURGICAL ONCOLOGY | Facility: HOSPITAL | Age: 30
End: 2025-01-06
Payer: COMMERCIAL

## 2025-01-06 ENCOUNTER — PATIENT MESSAGE (OUTPATIENT)
Dept: SURGICAL ONCOLOGY | Facility: CLINIC | Age: 30
End: 2025-01-06
Payer: COMMERCIAL

## 2025-01-06 DIAGNOSIS — Z17.0 MALIGNANT NEOPLASM OF LOWER-OUTER QUADRANT OF RIGHT BREAST OF FEMALE, ESTROGEN RECEPTOR POSITIVE: ICD-10-CM

## 2025-01-06 DIAGNOSIS — C50.511 MALIGNANT NEOPLASM OF LOWER-OUTER QUADRANT OF RIGHT BREAST OF FEMALE, ESTROGEN RECEPTOR POSITIVE: ICD-10-CM

## 2025-01-08 ENCOUNTER — TELEPHONE (OUTPATIENT)
Dept: HEMATOLOGY/ONCOLOGY | Facility: HOSPITAL | Age: 30
End: 2025-01-08
Payer: COMMERCIAL

## 2025-01-08 NOTE — TELEPHONE ENCOUNTER
Previously received referral from Dr. العلي for young adult with cancer/oncofertility consultation. Contacted patient by phone today, received voicemail - left detailed message regarding rationale for calling/consult request. Provided telephone number to call to schedule if desired.

## 2025-01-14 LAB
CYTOLOGY CMNT CVX/VAG CYTO-IMP: NORMAL
HPV HR 12 DNA GENITAL QL NAA+PROBE: NEGATIVE
HPV HR GENOTYPES PNL CVX NAA+PROBE: NEGATIVE
HPV16 DNA SPEC QL NAA+PROBE: NEGATIVE
HPV18 DNA SPEC QL NAA+PROBE: NEGATIVE
LAB AP HISTORY OF MALIGNANCY: NORMAL
LAB AP HPV GENOTYPE QUESTION: YES
LAB AP HPV HR: NORMAL
LABORATORY COMMENT REPORT: NORMAL
PATH REPORT.TOTAL CANCER: NORMAL

## 2025-01-17 NOTE — PROGRESS NOTES
Patient ID: Ligia Franklin is a 29 y.o. female.  Referring Physician: Addis لاعلي DO  [address] [facility], [address] [place] [address]  Primary Care Provider: JOEY Ponce-CNP  Oncology History   Malignant neoplasm of female breast   6/5/2024 Initial Diagnosis    Malignant neoplasm of female breast     12/10/2024 Cancer Staged    Staging form: Breast, AJCC 8th Edition, Clinical: Stage IB (cT1, cN0, cM0, G3, ER+, DC-, HER2-) - Signed by Addis العلي DO on 12/10/2024         Subjective    HPI  Ms. Strong is a 30 y/o F presenting for initial consultation at the request of Dr. العلي for new diagnosis of breast cancer.    Patient was initially diagnosed after new brown nipple discharge during shower shortly after her son was born and was still breastfeeding. 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 It was ER+95%, DC+35%. Given her young age of onset and unknown biological history as she was adopted, she was genetically tested and tested positive for the BRCA2 mutation. On 3/31/2023 Dr. Addis العلي performed bilateeral skin sparing mastectomy and right sentinel lymph node biopsy (0/3), DCIS only spanning 5cm on path. Margins negative. Focal 1mm anterior margin. Left skin sparing mastectomy showed benign findings. Dr. Jim Scruggs plastics did immediate reconstruction with bilateral prepectoral direct saline implants. Reviewed at tumor board and no re-excision recommended and no RT. Given DCIS findings alone and s/p bilateral mastectomy tumor there was not indication for endocrine therapy. She also met with gyn/onc Dr. Tabitha Trevino and ppx oopherectomy was recommended when she was done with child bearing.    She had follow up 10/21/24 for annual follow with plastics Dr Scruggs and reported discomfort with implants moving around and felt they were too large expressing interest in revision reconstruction.  On 11/12/24  she underwent bilateral revision reconstruction with implant exchange, and fat grafting. Unfortunately on pathology she was found to have IDC G3, measuring 5mm in greatest dimension involving fibrous and fibroadipose tissue with DCIS and margins could not be assessed. She underwent reexcision on 12/20/24 and noted to have two foci of IDC with DCIS with a positive new inferior margin. Final tumor size 12 mm overall grade 2. pT1cNx. Oncotype completed and found to have RS 31. She is planned for reexcision on 1/22/24    Patient recalls she had brown nipple discharge when checking to see if she still had breast milk. Child was 1 year old at time of initial diagnosis. Only known family history due to being adopted is her grandmother has breast cancer of unknown type. Patient and  are wedding photo/ together and worried about all of the weddings they have scheduled starting in May. Patient is reluctant about losing her hair and is agreeable to trying a cooling cap.      Patient's past medical history, surgical history, family history and social history reviewed.    Objective     Vitals:    01/20/25 1519   BP: 136/85   Pulse: 102   Resp: 17   Temp: 36.8 °C (98.2 °F)   SpO2: 96%     Review of Systems:   Review of Systems:    Positive per HPI, otherwise negative.   Physical Exam  Gen: appears well in clinic, NAD  HEENT: atraumatic head, normocephalic, EOMI, conjunctiva normal  LUNG: no increased WOB, CTAB  CV: No JVD. RRR  GI: soft, NT, ND  LE: no LE edema  Skin: no obvious rashes or lesions on visible skin  Neuro: interactive, no focal deficits noted  Psych: normal mood and affect  Performance Status:  Symptomatic; fully ambulatory    Labs/Imaging/Pathology: personally reviewed reports and images in Epic electronic medical record system. Pertinent results as it related to the plan represented in below in assessment and plan.   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 It was ER+95%, NJ+35%  - Given her young age of onset and unknown biological history as she was adopted, she was genetically tested and tested positive for the BRCA2 mutation  -  3/31/2023 Dr. Addis العلي performed bilateeral skin sparing mastectomy and right sentinel lymph node biopsy (0/3), DCIS spanning 5cm on path.Focal 1mm anterior margin. Left skin sparing mastectomy showed benign findings. Dr. Jim Scruggs plastics did immediate reconstruction with bilateral prepectoral direct saline implants  - Reviewed at tumor board and no re-excision recommended and no RT  - Given DCIS findings alone and s/p bilateral mastectomy tumor there was not indication for endocrine therapy  - She also met with gyn/onc Dr. Tabitha Trevino and ppx oopherectomy was recommended when she was done with child bearing.  - follow up 10/21/24 for annual follow with plastics Dr Scruggs and reported discomfort with implants moving around and felt they were too large expressing interest in revision reconstruction  -  11/12/24 she underwent bilateral revision reconstruction with implant exchange, and fat grafting. Unfortunately on pathology she was found to have IDC G3, measuring 5mm in greatest dimension involving fibrous and fibroadipose tissue with DCIS and margins could not be assessed  - reexcision on 12/20/24 and noted to have two foci of IDC with DCIS with a positive new inferior margin. Final tumor size 12 mm overall grade 2. pT1cNx  -Oncotype completed and found to have RS 31  - planned for reexcision on 1/22/24  - Today we discussed given oncotype score 31 and her young age I would recommend adjuvant chemotherapy.  - We discussed Taxotere + Cytoxan given node negative and also discussed AC+T  however due to her schedule and concern for longer term toxicity, they are very reluctant and reviewed there is not strong data that anthracycline based therapy is needed at this time  - Reviewed side effects and consent signed today.   - She is planned for revision surgery 1/22/25  - discussed importance of fertility preservation, [facility]rera contacted  - Will plan to start treatment on 2/17/25 if fertility preservation treatment has been completed  - fitted for cooling cap today  - RTC one week post treatment for toxicity check with Matthew and with me for C2.  - Referral sent to onco fertility placed today.    RTC one week after treatment for toxicity check with Matthew. This note has been transcribed using a medical scribe and there is a possibility of unintentional typing misprints.    Diagnoses and all orders for this visit:  Malignant neoplasm of nipple of right breast in female, estrogen receptor positive  -     ondansetron (Zofran) 8 mg tablet; Take 1 tablet (8 mg) by mouth every 8 hours if needed for nausea or vomiting.  -     prochlorperazine (Compazine) 10 mg tablet; Take 1 tablet (10 mg) by mouth every 6 hours if needed for nausea or vomiting.  -     Referral to Oncofertility; Future  -     Clinic Appointment Request; Future  -     Infusion Appointment Request; Future  -     CBC and Auto Differential; Future  -     Comprehensive metabolic panel; Future  -     Hcg, Quantitative, Pregnancy; Future  -     Hepatitis B surface antigen; Future  -     Hepatitis B Core Antibody, Total; Future  -     Hepatitis B surface antibody; Future  -     Infusion Appointment Request; Future  -     Clinic Appointment Request; Future  -     Infusion Appointment Request; Future  -     CBC and Auto Differential; Future  -     Comprehensive metabolic panel; Future  -     Infusion Appointment Request; Future  Malignant neoplasm of lower-outer quadrant of right breast of female, estrogen receptor positive  -     Referral To  Hematology and Oncology  -     Cancer Antigen 27-29; Future  -     Infusion Appointment Request; Future           Marion Garcia MD  Hematology/Oncology  [facility]    Mahamed Attestation  By signing my name below, I, Mahamed Levy   attest that this documentation has been prepared under the direction and in the presence of Marion Garcia MD.     Time Spent  Prep time on day of patient encounter: 5 minutes  Time spent directly with patient, family or caregiver: 70 minutes  Additional Time Spent on Patient Care Activities: 7 minutes  Documentation Time: 7 minutes  Other Time Spent: 0 minutes  Total: 89 minutes

## 2025-01-20 ENCOUNTER — PATIENT OUTREACH (OUTPATIENT)
Dept: HEMATOLOGY/ONCOLOGY | Facility: CLINIC | Age: 30
End: 2025-01-20

## 2025-01-20 ENCOUNTER — OFFICE VISIT (OUTPATIENT)
Dept: HEMATOLOGY/ONCOLOGY | Facility: CLINIC | Age: 30
End: 2025-01-20
Payer: COMMERCIAL

## 2025-01-20 VITALS
OXYGEN SATURATION: 96 % | HEART RATE: 102 BPM | WEIGHT: 142.64 LBS | BODY MASS INDEX: 23.76 KG/M2 | TEMPERATURE: 98.2 F | SYSTOLIC BLOOD PRESSURE: 136 MMHG | DIASTOLIC BLOOD PRESSURE: 85 MMHG | RESPIRATION RATE: 17 BRPM | HEIGHT: 65 IN

## 2025-01-20 DIAGNOSIS — Z17.0 MALIGNANT NEOPLASM OF LOWER-OUTER QUADRANT OF RIGHT BREAST OF FEMALE, ESTROGEN RECEPTOR POSITIVE: ICD-10-CM

## 2025-01-20 DIAGNOSIS — Z17.0 MALIGNANT NEOPLASM OF NIPPLE OF RIGHT BREAST IN FEMALE, ESTROGEN RECEPTOR POSITIVE: Primary | ICD-10-CM

## 2025-01-20 DIAGNOSIS — C50.511 MALIGNANT NEOPLASM OF LOWER-OUTER QUADRANT OF RIGHT BREAST OF FEMALE, ESTROGEN RECEPTOR POSITIVE: ICD-10-CM

## 2025-01-20 DIAGNOSIS — C50.011 MALIGNANT NEOPLASM OF NIPPLE OF RIGHT BREAST IN FEMALE, ESTROGEN RECEPTOR POSITIVE: Primary | ICD-10-CM

## 2025-01-20 PROCEDURE — 99215 OFFICE O/P EST HI 40 MIN: CPT | Performed by: INTERNAL MEDICINE

## 2025-01-20 PROCEDURE — 3008F BODY MASS INDEX DOCD: CPT | Performed by: INTERNAL MEDICINE

## 2025-01-20 PROCEDURE — 99417 PROLNG OP E/M EACH 15 MIN: CPT | Performed by: INTERNAL MEDICINE

## 2025-01-20 PROCEDURE — 99205 OFFICE O/P NEW HI 60 MIN: CPT | Performed by: INTERNAL MEDICINE

## 2025-01-20 RX ORDER — PROCHLORPERAZINE MALEATE 10 MG
10 TABLET ORAL EVERY 6 HOURS PRN
Qty: 30 TABLET | Refills: 5 | Status: SHIPPED | OUTPATIENT
Start: 2025-01-20

## 2025-01-20 RX ORDER — ONDANSETRON HYDROCHLORIDE 8 MG/1
8 TABLET, FILM COATED ORAL EVERY 8 HOURS PRN
Qty: 30 TABLET | Refills: 5 | Status: SHIPPED | OUTPATIENT
Start: 2025-01-20

## 2025-01-20 ASSESSMENT — PATIENT HEALTH QUESTIONNAIRE - PHQ9
SUM OF ALL RESPONSES TO PHQ9 QUESTIONS 1 AND 2: 0
1. LITTLE INTEREST OR PLEASURE IN DOING THINGS: NOT AT ALL
2. FEELING DOWN, DEPRESSED OR HOPELESS: NOT AT ALL

## 2025-01-20 ASSESSMENT — ENCOUNTER SYMPTOMS
OCCASIONAL FEELINGS OF UNSTEADINESS: 0
DEPRESSION: 0
LOSS OF SENSATION IN FEET: 0

## 2025-01-20 ASSESSMENT — PAIN SCALES - GENERAL: PAINLEVEL_OUTOF10: 0-NO PAIN

## 2025-01-20 NOTE — PROGRESS NOTES
I met with patient and  today after their initial consultation with Dr. Garcia to provide education and resources. I provided handouts and education on planned treatment: Docetaxel/Cyclophosphamide, 21 day cycle. Ligia is planned to start on 2/17/2024, aware she will obtain lab work prior to treatment (reviewed lab work ordered). We reviewed possible side effects, management of side effects and when/how to alert provider of side effects occur. Instructed to call our office with any temperature 100.4 or higher and provided handout on fever management. Red education bag given to patient. Pt is interested in cooling cap during treatment - provided education on [employer] cooling cap and completed fitting. I let her know I we will submit enrollment form and then she will be contacted by [employer].     Office contact information provided to patient, including on-call information for after hours. Introduced patient to role of  and dietitian (if needed) at [facility]. I let her know social work will be reaching out to her via phone prior to treatment. Resources for [facility], community resources and  financial counselor given to patient. Emotional support provided to her and her spouse. Encouraged them to call our office with any questions or concerns.

## 2025-01-21 ENCOUNTER — PATIENT MESSAGE (OUTPATIENT)
Dept: SURGICAL ONCOLOGY | Facility: CLINIC | Age: 30
End: 2025-01-21
Payer: COMMERCIAL

## 2025-01-21 ENCOUNTER — ANESTHESIA EVENT (OUTPATIENT)
Dept: OPERATING ROOM | Facility: HOSPITAL | Age: 30
End: 2025-01-21
Payer: COMMERCIAL

## 2025-01-21 RX ORDER — PROCHLORPERAZINE MALEATE 10 MG
10 TABLET ORAL EVERY 6 HOURS PRN
OUTPATIENT
Start: 2025-02-17

## 2025-01-21 RX ORDER — DIPHENHYDRAMINE HCL 25 MG
25 CAPSULE ORAL ONCE
OUTPATIENT
Start: 2025-02-17

## 2025-01-21 RX ORDER — PALONOSETRON 0.05 MG/ML
0.25 INJECTION, SOLUTION INTRAVENOUS ONCE
OUTPATIENT
Start: 2025-02-17

## 2025-01-21 RX ORDER — DIPHENHYDRAMINE HYDROCHLORIDE 50 MG/ML
50 INJECTION INTRAMUSCULAR; INTRAVENOUS AS NEEDED
OUTPATIENT
Start: 2025-02-17

## 2025-01-21 RX ORDER — FAMOTIDINE 10 MG/ML
20 INJECTION INTRAVENOUS ONCE AS NEEDED
OUTPATIENT
Start: 2025-02-17

## 2025-01-21 RX ORDER — PROCHLORPERAZINE EDISYLATE 5 MG/ML
10 INJECTION INTRAMUSCULAR; INTRAVENOUS EVERY 6 HOURS PRN
OUTPATIENT
Start: 2025-02-17

## 2025-01-21 RX ORDER — ALBUTEROL SULFATE 0.83 MG/ML
3 SOLUTION RESPIRATORY (INHALATION) AS NEEDED
OUTPATIENT
Start: 2025-02-17

## 2025-01-21 RX ORDER — EPINEPHRINE 0.3 MG/.3ML
0.3 INJECTION SUBCUTANEOUS EVERY 5 MIN PRN
OUTPATIENT
Start: 2025-02-17

## 2025-01-22 ENCOUNTER — PHARMACY VISIT (OUTPATIENT)
Dept: PHARMACY | Facility: CLINIC | Age: 30
End: 2025-01-22
Payer: COMMERCIAL

## 2025-01-22 ENCOUNTER — HOSPITAL ENCOUNTER (OUTPATIENT)
Facility: HOSPITAL | Age: 30
Setting detail: OUTPATIENT SURGERY
Discharge: HOME | End: 2025-01-22
Attending: SURGERY | Admitting: SURGERY
Payer: COMMERCIAL

## 2025-01-22 ENCOUNTER — ANESTHESIA (OUTPATIENT)
Dept: OPERATING ROOM | Facility: HOSPITAL | Age: 30
End: 2025-01-22
Payer: COMMERCIAL

## 2025-01-22 VITALS
HEIGHT: 64 IN | RESPIRATION RATE: 18 BRPM | DIASTOLIC BLOOD PRESSURE: 71 MMHG | WEIGHT: 144 LBS | TEMPERATURE: 97.7 F | HEART RATE: 88 BPM | BODY MASS INDEX: 24.59 KG/M2 | SYSTOLIC BLOOD PRESSURE: 120 MMHG | OXYGEN SATURATION: 98 %

## 2025-01-22 DIAGNOSIS — C50.511 MALIGNANT NEOPLASM OF LOWER-OUTER QUADRANT OF RIGHT BREAST OF FEMALE, ESTROGEN RECEPTOR POSITIVE: ICD-10-CM

## 2025-01-22 DIAGNOSIS — Z17.0 MALIGNANT NEOPLASM OF LOWER-OUTER QUADRANT OF RIGHT BREAST OF FEMALE, ESTROGEN RECEPTOR POSITIVE: ICD-10-CM

## 2025-01-22 DIAGNOSIS — G89.18 POST-OP PAIN: Primary | ICD-10-CM

## 2025-01-22 LAB — HCG UR QL IA.RAPID: NEGATIVE

## 2025-01-22 PROCEDURE — 81025 URINE PREGNANCY TEST: CPT | Performed by: SURGERY

## 2025-01-22 PROCEDURE — 2500000004 HC RX 250 GENERAL PHARMACY W/ HCPCS (ALT 636 FOR OP/ED): Performed by: NURSE ANESTHETIST, CERTIFIED REGISTERED

## 2025-01-22 PROCEDURE — 3600000004 HC OR TIME - INITIAL BASE CHARGE - PROCEDURE LEVEL FOUR: Performed by: SURGERY

## 2025-01-22 PROCEDURE — 7100000001 HC RECOVERY ROOM TIME - INITIAL BASE CHARGE: Performed by: SURGERY

## 2025-01-22 PROCEDURE — 2500000001 HC RX 250 WO HCPCS SELF ADMINISTERED DRUGS (ALT 637 FOR MEDICARE OP): Performed by: STUDENT IN AN ORGANIZED HEALTH CARE EDUCATION/TRAINING PROGRAM

## 2025-01-22 PROCEDURE — 2720000007 HC OR 272 NO HCPCS: Performed by: SURGERY

## 2025-01-22 PROCEDURE — 7100000010 HC PHASE TWO TIME - EACH INCREMENTAL 1 MINUTE: Performed by: SURGERY

## 2025-01-22 PROCEDURE — A19380 PR REVISE BREAST RECONSTRUCTION: Performed by: NURSE ANESTHETIST, CERTIFIED REGISTERED

## 2025-01-22 PROCEDURE — 19301 PARTIAL MASTECTOMY: CPT | Performed by: SURGERY

## 2025-01-22 PROCEDURE — 19380 REVJ RECONSTRUCTED BREAST: CPT | Performed by: SURGERY

## 2025-01-22 PROCEDURE — 7100000009 HC PHASE TWO TIME - INITIAL BASE CHARGE: Performed by: SURGERY

## 2025-01-22 PROCEDURE — 2500000004 HC RX 250 GENERAL PHARMACY W/ HCPCS (ALT 636 FOR OP/ED): Performed by: STUDENT IN AN ORGANIZED HEALTH CARE EDUCATION/TRAINING PROGRAM

## 2025-01-22 PROCEDURE — 2500000004 HC RX 250 GENERAL PHARMACY W/ HCPCS (ALT 636 FOR OP/ED): Performed by: ANESTHESIOLOGY

## 2025-01-22 PROCEDURE — 7100000002 HC RECOVERY ROOM TIME - EACH INCREMENTAL 1 MINUTE: Performed by: SURGERY

## 2025-01-22 PROCEDURE — 3600000009 HC OR TIME - EACH INCREMENTAL 1 MINUTE - PROCEDURE LEVEL FOUR: Performed by: SURGERY

## 2025-01-22 PROCEDURE — 3700000001 HC GENERAL ANESTHESIA TIME - INITIAL BASE CHARGE: Performed by: SURGERY

## 2025-01-22 PROCEDURE — 88307 TISSUE EXAM BY PATHOLOGIST: CPT | Mod: TC,STJLAB,WESLAB | Performed by: SURGERY

## 2025-01-22 PROCEDURE — RXMED WILLOW AMBULATORY MEDICATION CHARGE

## 2025-01-22 PROCEDURE — 3700000002 HC GENERAL ANESTHESIA TIME - EACH INCREMENTAL 1 MINUTE: Performed by: SURGERY

## 2025-01-22 PROCEDURE — A19380 PR REVISE BREAST RECONSTRUCTION: Performed by: ANESTHESIOLOGY

## 2025-01-22 RX ORDER — MIDAZOLAM HYDROCHLORIDE 1 MG/ML
1 INJECTION, SOLUTION INTRAMUSCULAR; INTRAVENOUS ONCE AS NEEDED
Status: DISCONTINUED | OUTPATIENT
Start: 2025-01-22 | End: 2025-01-22 | Stop reason: HOSPADM

## 2025-01-22 RX ORDER — LIDOCAINE HYDROCHLORIDE 10 MG/ML
0.1 INJECTION, SOLUTION INFILTRATION; PERINEURAL ONCE
Status: DISCONTINUED | OUTPATIENT
Start: 2025-01-22 | End: 2025-01-22 | Stop reason: HOSPADM

## 2025-01-22 RX ORDER — PROPOFOL 10 MG/ML
INJECTION, EMULSION INTRAVENOUS AS NEEDED
Status: DISCONTINUED | OUTPATIENT
Start: 2025-01-22 | End: 2025-01-22

## 2025-01-22 RX ORDER — SULFAMETHOXAZOLE AND TRIMETHOPRIM 800; 160 MG/1; MG/1
1 TABLET ORAL 2 TIMES DAILY
Qty: 20 TABLET | Refills: 0 | Status: SHIPPED | OUTPATIENT
Start: 2025-01-22 | End: 2025-02-01

## 2025-01-22 RX ORDER — SODIUM CHLORIDE, SODIUM LACTATE, POTASSIUM CHLORIDE, CALCIUM CHLORIDE 600; 310; 30; 20 MG/100ML; MG/100ML; MG/100ML; MG/100ML
100 INJECTION, SOLUTION INTRAVENOUS CONTINUOUS
Status: ACTIVE | OUTPATIENT
Start: 2025-01-22 | End: 2025-01-22

## 2025-01-22 RX ORDER — TRAMADOL HYDROCHLORIDE 50 MG/1
50 TABLET ORAL EVERY 6 HOURS PRN
Qty: 12 TABLET | Refills: 0 | Status: SHIPPED | OUTPATIENT
Start: 2025-01-22 | End: 2025-01-29

## 2025-01-22 RX ORDER — LIDOCAINE HYDROCHLORIDE 20 MG/ML
INJECTION, SOLUTION EPIDURAL; INFILTRATION; INTRACAUDAL; PERINEURAL AS NEEDED
Status: DISCONTINUED | OUTPATIENT
Start: 2025-01-22 | End: 2025-01-22

## 2025-01-22 RX ORDER — ALBUTEROL SULFATE 0.83 MG/ML
2.5 SOLUTION RESPIRATORY (INHALATION) ONCE AS NEEDED
Status: DISCONTINUED | OUTPATIENT
Start: 2025-01-22 | End: 2025-01-22 | Stop reason: HOSPADM

## 2025-01-22 RX ORDER — FENTANYL CITRATE 50 UG/ML
INJECTION, SOLUTION INTRAMUSCULAR; INTRAVENOUS AS NEEDED
Status: DISCONTINUED | OUTPATIENT
Start: 2025-01-22 | End: 2025-01-22

## 2025-01-22 RX ORDER — ONDANSETRON HYDROCHLORIDE 2 MG/ML
INJECTION, SOLUTION INTRAVENOUS AS NEEDED
Status: DISCONTINUED | OUTPATIENT
Start: 2025-01-22 | End: 2025-01-22

## 2025-01-22 RX ORDER — MEPERIDINE HYDROCHLORIDE 50 MG/ML
12.5 INJECTION INTRAMUSCULAR; INTRAVENOUS; SUBCUTANEOUS EVERY 10 MIN PRN
Status: DISCONTINUED | OUTPATIENT
Start: 2025-01-22 | End: 2025-01-22 | Stop reason: HOSPADM

## 2025-01-22 RX ORDER — MIDAZOLAM HYDROCHLORIDE 1 MG/ML
INJECTION, SOLUTION INTRAMUSCULAR; INTRAVENOUS AS NEEDED
Status: DISCONTINUED | OUTPATIENT
Start: 2025-01-22 | End: 2025-01-22

## 2025-01-22 RX ORDER — FENTANYL CITRATE 50 UG/ML
25 INJECTION, SOLUTION INTRAMUSCULAR; INTRAVENOUS EVERY 5 MIN PRN
Status: DISCONTINUED | OUTPATIENT
Start: 2025-01-22 | End: 2025-01-22 | Stop reason: HOSPADM

## 2025-01-22 RX ORDER — SUCCINYLCHOLINE/SOD CL,ISO/PF 100 MG/5ML
SYRINGE (ML) INTRAVENOUS AS NEEDED
Status: DISCONTINUED | OUTPATIENT
Start: 2025-01-22 | End: 2025-01-22

## 2025-01-22 RX ORDER — CEFAZOLIN SODIUM 2 G/100ML
INJECTION, SOLUTION INTRAVENOUS AS NEEDED
Status: DISCONTINUED | OUTPATIENT
Start: 2025-01-22 | End: 2025-01-22

## 2025-01-22 RX ORDER — OXYCODONE HYDROCHLORIDE 5 MG/1
5 TABLET ORAL EVERY 4 HOURS PRN
Status: DISCONTINUED | OUTPATIENT
Start: 2025-01-22 | End: 2025-01-22 | Stop reason: HOSPADM

## 2025-01-22 RX ORDER — ONDANSETRON HYDROCHLORIDE 2 MG/ML
4 INJECTION, SOLUTION INTRAVENOUS ONCE AS NEEDED
Status: DISCONTINUED | OUTPATIENT
Start: 2025-01-22 | End: 2025-01-22 | Stop reason: HOSPADM

## 2025-01-22 RX ORDER — DEXAMETHASONE SODIUM PHOSPHATE 10 MG/ML
INJECTION INTRAMUSCULAR; INTRAVENOUS AS NEEDED
Status: DISCONTINUED | OUTPATIENT
Start: 2025-01-22 | End: 2025-01-22

## 2025-01-22 RX ORDER — SCOPOLAMINE 1 MG/3D
1 PATCH, EXTENDED RELEASE TRANSDERMAL
Status: DISCONTINUED | OUTPATIENT
Start: 2025-01-22 | End: 2025-01-22 | Stop reason: HOSPADM

## 2025-01-22 RX ORDER — LABETALOL HYDROCHLORIDE 5 MG/ML
5 INJECTION, SOLUTION INTRAVENOUS ONCE AS NEEDED
Status: DISCONTINUED | OUTPATIENT
Start: 2025-01-22 | End: 2025-01-22 | Stop reason: HOSPADM

## 2025-01-22 RX ADMIN — OXYCODONE HYDROCHLORIDE 5 MG: 5 TABLET ORAL at 15:12

## 2025-01-22 RX ADMIN — LIDOCAINE HYDROCHLORIDE 100 MG: 20 INJECTION, SOLUTION EPIDURAL; INFILTRATION; INTRACAUDAL; PERINEURAL at 12:22

## 2025-01-22 RX ADMIN — CEFAZOLIN SODIUM 2 G: 2 INJECTION, SOLUTION INTRAVENOUS at 12:30

## 2025-01-22 RX ADMIN — SODIUM CHLORIDE, SODIUM LACTATE, POTASSIUM CHLORIDE, AND CALCIUM CHLORIDE: .6; .31; .03; .02 INJECTION, SOLUTION INTRAVENOUS at 12:18

## 2025-01-22 RX ADMIN — SCOPOLAMINE 1 PATCH: 1.5 PATCH, EXTENDED RELEASE TRANSDERMAL at 10:54

## 2025-01-22 RX ADMIN — HYDROMORPHONE HYDROCHLORIDE 0.5 MG: 1 INJECTION, SOLUTION INTRAMUSCULAR; INTRAVENOUS; SUBCUTANEOUS at 14:09

## 2025-01-22 RX ADMIN — DEXAMETHASONE SODIUM PHOSPHATE 10 MG: 10 INJECTION, SOLUTION INTRAMUSCULAR; INTRAVENOUS at 12:30

## 2025-01-22 RX ADMIN — ONDANSETRON 4 MG: 2 INJECTION INTRAMUSCULAR; INTRAVENOUS at 13:09

## 2025-01-22 RX ADMIN — PROPOFOL 200 MG: 10 INJECTION, EMULSION INTRAVENOUS at 12:22

## 2025-01-22 RX ADMIN — FENTANYL CITRATE 100 MCG: 50 INJECTION, SOLUTION INTRAMUSCULAR; INTRAVENOUS at 12:22

## 2025-01-22 RX ADMIN — MIDAZOLAM 2 MG: 1 INJECTION INTRAMUSCULAR; INTRAVENOUS at 12:16

## 2025-01-22 RX ADMIN — HYDROMORPHONE HYDROCHLORIDE 0.5 MG: 1 INJECTION, SOLUTION INTRAMUSCULAR; INTRAVENOUS; SUBCUTANEOUS at 13:55

## 2025-01-22 RX ADMIN — PROPOFOL 50 MCG/KG/MIN: 10 INJECTION, EMULSION INTRAVENOUS at 12:35

## 2025-01-22 RX ADMIN — Medication 80 MG: at 12:22

## 2025-01-22 RX ADMIN — FENTANYL CITRATE 50 MCG: 50 INJECTION, SOLUTION INTRAMUSCULAR; INTRAVENOUS at 12:43

## 2025-01-22 ASSESSMENT — PAIN SCALES - GENERAL
PAINLEVEL_OUTOF10: 3
PAINLEVEL_OUTOF10: 2
PAINLEVEL_OUTOF10: 4
PAINLEVEL_OUTOF10: 3
PAINLEVEL_OUTOF10: 5 - MODERATE PAIN
PAINLEVEL_OUTOF10: 7
PAINLEVEL_OUTOF10: 0 - NO PAIN
PAINLEVEL_OUTOF10: 0 - NO PAIN
PAINLEVEL_OUTOF10: 7

## 2025-01-22 ASSESSMENT — PAIN - FUNCTIONAL ASSESSMENT
PAIN_FUNCTIONAL_ASSESSMENT: 0-10

## 2025-01-22 ASSESSMENT — PAIN DESCRIPTION - LOCATION
LOCATION: BREAST
LOCATION: BREAST

## 2025-01-22 ASSESSMENT — PAIN DESCRIPTION - ORIENTATION: ORIENTATION: RIGHT

## 2025-01-22 ASSESSMENT — PAIN SCALES - PAIN ASSESSMENT IN ADVANCED DEMENTIA (PAINAD): BREATHING: NORMAL

## 2025-01-22 NOTE — OP NOTE
Date: 2025  OR Location: [facility] OR    Name: Ligia Franklin, : 1995, Age: 29 y.o., MRN: [id number], Sex: female    Diagnosis  Pre-op Diagnosis      * Malignant neoplasm of lower-outer quadrant of right breast of female, estrogen receptor positive [C50.511, Z17.0] Post-op Diagnosis     * Malignant neoplasm of lower-outer quadrant of right breast of female, estrogen receptor positive [C50.511, Z17.0]     Procedures  RIGHT BREAST MARGIN RE-EXCISION  31459 - SD MASTECTOMY PARTIAL    Surgeons   Panel 1:     * Addis العلي - Primary  Panel 2:     * Jim Scruggs - Primary    Resident/Fellow/Other Assistant:  Surgeons and Role:  * No surgeons found with a matching role *    Procedure Summary  Anesthesia: General  ASA: II  Anesthesia Staff: Anesthesiologist: Almas Truong DO  CRNA: JOEY Frank-CRNA  Estimated Blood Loss: 2mL    Staff:   Circulator: Benita Bonds RN; Holli Wilson RN  Scrub Person: Lamberto Teixeira; Jin Delgadillo; Gertrude Naidu    Details of Procedure:    The patient was identified by name and birthdate and transported to the operative suite and placed supine on the OR table.  A sign-in was performed verifying patient identity, procedure and laterality.  One dose of preoperative antibiotics was given.  After induction of anesthesia the right reconstructed breast and axilla were prepped and draped in the usual sterile fashion.  Ultrasound was used to confirm the location of the lesion to be excised.  Just prior to incision, a time-out was performed confirming patient identity, procedure and laterality.  The medial inferior portion of the prior re-excision was focally positive for DCIS.  I made an elliptical incision encompassing the incision line and inferior portion of the flap and dissected a new inferior margin.  The New inferior margin was marked with black ink and passed off the field to pathology.  This new margins went from skin to capsule of the implant. The case  was then turned over to Dr. Scruggs for additional mobilization of the flaps and revision of the reconstruction which will be dictated as a separate report.    Task Performed by RNFA or Surgical Assistant: retraction    SPECIMEN:    Right breast inferior margin re-excision- ink marks new inferior margin

## 2025-01-22 NOTE — ANESTHESIA PREPROCEDURE EVALUATION
Patient: Ligia Franklin    Procedure Information       Date/Time: 01/22/25 1200    Procedures:       RIGHT BREAST MARGIN RE-EXCISION (Right)      RECONSTRUCTION, BREAST, UNILATERAL, IMMEDIATE, AFTER MASTECTOMY (Right)    Location: [facility] OR 02 / Virtual [facility] OR    Surgeons: Addis العلي DO; Jim Scruggs MD            Relevant Problems   Anesthesia   (+) PONV (postoperative nausea and vomiting)      Neuro   (+) Depression      GYN   (+) Malignant neoplasm of female breast       Clinical information reviewed:   Tobacco  Allergies  Meds   Med Hx  Surg Hx  OB Status  Fam Hx  Soc   Hx        NPO Detail:  NPO/Void Status  Date of Last Liquid: 01/22/25  Time of Last Liquid: 0800  Date of Last Solid: 01/21/25  Time of Last Solid: 2200  Last Intake Type: Clear fluids  Time of Last Void: 1000         Physical Exam    Airway  Mallampati: II  TM distance: >3 FB  Neck ROM: full     Cardiovascular - normal exam     Dental    Pulmonary - normal exam     Abdominal - normal exam             Anesthesia Plan    History of general anesthesia?: yes  History of complications of general anesthesia?: no    ASA 2     general     intravenous induction   Anesthetic plan and risks discussed with patient.    Plan discussed with CRNA.

## 2025-01-22 NOTE — DISCHARGE INSTRUCTIONS
Plastic Surgery Breast Reconstruction                Post Operative Instructions    Office Phone: [phone] or contact central scheduling  After-Hours Patient line: [phone]  (Use this number for medical questions/concerns only after 4pm or on the weekends)      Activity:   -avoid activities that use your chest muscles such as pushing and pulling for 2 weeks after surgery.   -no lifting greater than 10lbs for 1 weeks after surgery  -ok for walking and ambulating    Wound/Dressing Care:   -You will have a surgical bra, wear this daily. Ok to remove to shower and then replace.   -If you have surgical drains. Strip, empty, and record output 2-3 times daily. Bring record log of drain output with you to your follow up appointment in 1 week   -your incisions will have purple surgical glue, this is ok to get wet. You may shower beginning the day after surgery. We recommend warm/hot showers 1-2 times daily. Ok for soap and water.     Pain:   Pain and discomfort is expected after any surgery, but most patients do well with Tylenol (acetaminophen) 650 mg every 6 hours in addition to Motrin or Advil (ibuprofen) 600 mg every 6 hours. These can be staggered so you're alternating the medications every 3 hours. Do not take ibuprofen if you have kidney disease or have previously been told to avoid NSAIDs. You may also have a prescription for a stronger opioid-based pain medication at discharge, which is only for breakthrough pain. You should take Colace 100 mg capsules (available at any local drug store) twice a day while taking any opioid pain medication. Constipation can be alleviated with over-the-counter laxatives (such as Milk of Magnesia) until the problem has resolved. If you take any anticoagulation or antiplatelet medication, this medication can be resumed 48 hours after surgery unless you were informed otherwise.

## 2025-01-22 NOTE — ANESTHESIA PROCEDURE NOTES
Airway  Date/Time: 1/22/2025 12:28 PM  Urgency: elective    Airway not difficult    Staffing  Performed: RANDALL   Authorized by: MARIO ALBERTO Frank    Performed by: MARIO ALBERTO Frank  Patient location during procedure: OR    Indications and Patient Condition  Indications for airway management: anesthesia  Spontaneous Ventilation: absent  Sedation level: deep  Preoxygenated: yes  Patient position: sniffing  MILS maintained throughout  Mask difficulty assessment: 1 - vent by mask    Final Airway Details  Final airway type: endotracheal airway      Successful airway: ETT  Cuffed: yes   Successful intubation technique: video laryngoscopy (talamantes)  Blade size: #3  ETT size (mm): 6.5  Cormack-Lehane Classification: grade I - full view of glottis  Placement verified by: chest auscultation and capnometry   Cuff volume (mL): 5  Measured from: lips  ETT to lips (cm): 22  Number of attempts at approach: 1

## 2025-01-22 NOTE — OP NOTE
Plastic Surgery Operative Note       Date: 2025  OR Location: [facility] OR    Name: Ligia Franklin, : 1995, Age: 29 y.o., MRN: [id number], Sex: female    Diagnosis  Pre-op Diagnosis      * Malignant neoplasm of lower-outer quadrant of right breast of female, estrogen receptor positive [C50.511, Z17.0] Post-op Diagnosis     * Malignant neoplasm of lower-outer quadrant of right breast of female, estrogen receptor positive [C50.511, Z17.0]     Procedures  Revision right breast reconstruction  Right breast capsulotomy        Surgeons   Jim Scruggs MD     Resident/Fellow/Other Assistant:  Lamberto Castellanos, Given the inherent complexity of this surgery, a second surgeon or a surgically skilled physician assistant was necessary for the successful completion of this entire operative procedure. Lamberto Castellanos served as the surgical assistant and was present for the entire operative procedure and participated in all aspects of patient care. This included transporting the patient to the operating room and entering room with the patient, assisting with preoperative positioning, first assisting throughout the entire surgical procedure by functioning as a second assistant surgeon, assisting with surgical closure, and finally accompanying the patient to recovery.      Staff:   Sukhdevulator: Benita  Circulator: Holli  Scrub Person: Gertrude  Scrub Person: Jin  Scrub Person: Lamberto    Anesthesia Staff: Anesthesiologist: Almas Truong DO  CRNA: JOEY Frank-SABRINA    Procedure Summary  Anesthesia: Anesthesia type not filed in the log.  ASA: II  Estimated Blood Loss: 3mL  Intra-op Medications:   Administrations occurring from 1200 to 1525 on 25:   Medication Name Total Dose   ceFAZolin (Ancef) IV 2 g in 100 mL dextrose (iso) - premix 2 g   dexAMETHasone (Decadron) PF injection 10 mg/mL 10 mg   fentaNYL (Sublimaze) injection 50 mcg/mL 100 mcg   LR bolus Cannot be calculated   lidocaine PF (Xylocaine-MPF) local injection  2 % 100 mg   midazolam (Versed) injection 1 mg/mL 2 mg   propofol (Diprivan) injection 10 mg/mL 465.85 mg   succinylcholine PF in sodium chloride IV syringe 80 mg              Anesthesia Record               Intraprocedure I/O Totals       None           Specimen:   ID Type Source Tests Collected by Time   1 : RIGHT BREAST INFERIOR MARGIN RE-EXCISION - INK MARKS NEW INFERIOR MARGIN Tissue BREAST MARGIN RIGHT SURGICAL PATHOLOGY EXAM Addis العلي, DO 1/22/2025 1251                 Drains and/or Catheters: * None in log *    Tourniquet Times:         Implants:     Findings: As expected    Indications: Ligia Franklin is an 29 y.o. female who is having surgery for Malignant neoplasm of lower-outer quadrant of right breast of female, estrogen receptor positive [C50.511, Z17.0]. status post  bilateral breast reconstruction revision, with removal of 490 cc structured saline implants, placement of 370 cc structured saline implants, with bilateral fat grafting and bilateral axillary dogear removal performed on 11/12/2024   Previously she is status post bilateral skin sparing mastectomy with the right sentinel node biopsy and immediate direct to implant breast reconstruction on 3/31/2023.  The specimens from 11/12/2024 surgery, right side specimen showed positive residual invasive carcinoma, as well as DCIS.  She return to the operating room on 12/20/2024 for reexcision and breast revision.  Unfortunately after this reexcision there remained positive margin inferior medially and she returns today for reexcision again.    INFORMED CONSENT  Patient was told the risks, benefits, INDICATIONS, contraindications, and alternatives to the procedure. Risks include but not limited to pain, infection, bleeding, hematoma, seroma, injury to neurovascular and tendinous structures, dissatisfaction cosmetically, asymmetry, and need for subsequent surgeries.      The patient demonstrated general endotracheal anesthesia of these risks and  agreed to proceed with surgery.  Advance directives discussed.  Team approach explained.       The patient consented and wished to proceed with the procedure and for medical photography if needed.     PROCEDURAL PAUSE  Prior to the beginning of the procedure, the patient's correct identity, side, site, and procedure to be performed were verified.  The patient was given intravenous antibiotics prior to skin incision.     PROCEDURAL NOTE  Ligia was brought to the operative theater at which point She was transferred to the operating room table.  All bony prominences were well padded, and sequential compression devices were placed to each lower extremity. Patient was then secured with safety straps.  The patient was then placed under IV sedation, and our anesthesia colleagues administered general endotracheal anesthesia.      bilateral breast were prepped and draped in a standard sterile fashion.  Dr. العلي began and performed the lips as well as removal of capsule, please see her note for full operative details.     We into the operative theater and identified approximately 11 x 4 cm open wound with exposed implant, medial to the previous repeat-excision.  We performed capsulotomy, we advanced the previously placed a TIGR matrix mesh to fill the defect of the capsule, and closed this with 2-0 Vicryl..  After this was completed we circumferentially undermined a distance equal to the width of the open wound, and advanced the breast tissue to perform tension-free closure, we dissected and undermined cephalad and inferior.  This was performed for approximately 11.5 cm we irrigated with Irrisept, and performed closure with 2-0 Vicryl in the deep dermis followed by running 3-0 strata fix in the deep dermis which is turned around the subcuticular layer.  Dermabond and Prineo were then placed over the incision.  This was followed by ABDs and surgical bra.     the patient tolerated the procedure well and was awakened from  anesthesia without any difficulties, she was transferred to the PACU in stable condition, all needle counts were correct.     POST OP PLAN:  Ligia will remain outpatient.  She is prescribed pain medicine as well as antibiotic.  Will see her back in follow-up in 1 to 2 weeks

## 2025-01-22 NOTE — ANESTHESIA POSTPROCEDURE EVALUATION
Patient: Ligia Franklin    Procedure Summary       Date: 01/22/25 Room / Location: [facility] OR 02 / Virtual [facility] OR    Anesthesia Start: 1220 Anesthesia Stop:     Procedures:       RIGHT BREAST MARGIN RE-EXCISION (Right: Breast)      RECONSTRUCTION, BREAST, UNILATERAL (Right) Diagnosis:       Malignant neoplasm of lower-outer quadrant of right breast of female, estrogen receptor positive      (Malignant neoplasm of lower-outer quadrant of right breast of female, estrogen receptor positive [C50.511, Z17.0])    Surgeons: Addis العلي DO; Jim Scruggs MD Responsible Provider: Almas Truong DO    Anesthesia Type: general ASA Status: 2            Anesthesia Type: general    Vitals Value Taken Time   /83 01/22/25 1334   Temp 36.3 °C (97.3 °F) 01/22/25 1334   Pulse 88 01/22/25 1334   Resp 16 01/22/25 1334   SpO2 100 % 01/22/25 1334       Anesthesia Post Evaluation    Patient location during evaluation: PACU  Patient participation: complete - patient cannot participate  Level of consciousness: responsive to light touch  Pain management: adequate  Airway patency: patent  Cardiovascular status: acceptable, hemodynamically stable and stable  Respiratory status: acceptable, unassisted and face mask  Hydration status: acceptable  Postoperative Nausea and Vomiting: none        There were no known notable events for this encounter.

## 2025-01-23 ENCOUNTER — PATIENT OUTREACH (OUTPATIENT)
Dept: HEMATOLOGY/ONCOLOGY | Facility: CLINIC | Age: 30
End: 2025-01-23
Payer: COMMERCIAL

## 2025-01-23 ENCOUNTER — TELEPHONE (OUTPATIENT)
Dept: SURGICAL ONCOLOGY | Facility: HOSPITAL | Age: 30
End: 2025-01-23
Payer: COMMERCIAL

## 2025-01-23 NOTE — PROGRESS NOTES
I called and spoke with Ligia regarding upcoming treatment. I let Ligia know dates for cycle 1 and cycle 2 have been scheduled, pt confirmed she is able to view apt details on MyChart. I also explained we will have her come in for a supportive care/tox check visit one week after treatment, this will include a visit with our nurse practitioner and possible infusion visit if needed for IV fluids, PRN medications, etc. - pt verbalized understanding and agreeable to treatment plan.     I let patient know we have submitted her [employer] cooling cap enrollment form - they did require one more signature, Ligia will sign form electronically and then we will resubmit. She is aware our  Benita will be reaching out to her soon. Ligia had no further questions at this time. I let her know we will call her closer to her treatment date to check in.     Ligia did mention she was experiencing more pain after surgery than previous surgery (utilizing pain medication ordered) and has s/s of yeast infection. She plans to reach out to Dr. العلي's office to report symptoms. Will also route message to Dr. Hernandez team.

## 2025-01-23 NOTE — TELEPHONE ENCOUNTER
I spoke with Ms. Franklin regarding her post-op recovery.  She endorses more discomfort than her previous surgery.  We discussed it could be related to how recent the two surgeries were to each other, the amount of tissue removed and internal stitching that may be contributory.  We discussed alternating narcotic pain medication and over-the-counter anti-inflammatories.  We discussed comfort measures.  I will reach out early next week and check-in again.  Encouraged to contact the office sooner if she has additional concerns.

## 2025-01-27 ENCOUNTER — SOCIAL WORK (OUTPATIENT)
Dept: HEMATOLOGY/ONCOLOGY | Facility: CLINIC | Age: 30
End: 2025-01-27
Payer: COMMERCIAL

## 2025-01-27 NOTE — PROGRESS NOTES
The patient is a 29 year old who has been diagnosed with breast cancer. She is scheduled to start treatment in February and I reached out to her by phone today to further discuss possible funding for the cooling cap. She was very pleasant and easily engaged in conversation. I explained that there are two organizations ([employer]) and [employer]) offering assistance in paying for the cooling cap for those who qualify (based on income). The patient requested that I send information to her via email for her review; she will reach out to organizations with questions and apply as appropriate. She denied further needs at this time. I explained that I will touch base with her as she starts treatment to offer support and she was agreeable and expressed appreciation for the assistance provided. Following our conversation today, I sent an email to the patient with above organizations' websites and information about who qualifies for assistance, cost of cooling cap, and organizations offering support to young adults with cancer. My contact information was also provided and I encouraged her to reach out to be if I can be of further assistance. Social work will remain available to assist this patient.    RAFI Gleason, JEWEL

## 2025-01-28 ENCOUNTER — APPOINTMENT (OUTPATIENT)
Dept: HEMATOLOGY/ONCOLOGY | Facility: CLINIC | Age: 30
End: 2025-01-28
Payer: COMMERCIAL

## 2025-01-29 ENCOUNTER — TELEPHONE (OUTPATIENT)
Dept: HEMATOLOGY/ONCOLOGY | Facility: CLINIC | Age: 30
End: 2025-01-29
Payer: COMMERCIAL

## 2025-01-29 NOTE — TELEPHONE ENCOUNTER
Spoke with patient via telephone, she is planning to drop off signed [facility] enrollment form tomorrow morning. After received we will fax it to [facility]. Pt had questions regarding upcoming apts regarding referral to Onc[facility] - we reviewed she has a virtual visit on 2/3 with Tali Ferrera at 4PM. Pt ha no further questions at this time.

## 2025-01-30 ENCOUNTER — TELEPHONE (OUTPATIENT)
Dept: SURGICAL ONCOLOGY | Facility: HOSPITAL | Age: 30
End: 2025-01-30
Payer: COMMERCIAL

## 2025-01-30 LAB
AP SUMMARY REPORT: NORMAL
LABORATORY COMMENT REPORT: NORMAL
PATH REPORT.FINAL DX SPEC: NORMAL
PATH REPORT.GROSS SPEC: NORMAL
PATH REPORT.RELEVANT HX SPEC: NORMAL
PATH REPORT.TOTAL CANCER: NORMAL
RESIDENT REVIEW: NORMAL
SCAN RESULT: NORMAL

## 2025-01-30 NOTE — TELEPHONE ENCOUNTER
I spoke with Ms. Franklin regarding her post-operative recovery.  She states her pain is well controlled now.  She is anxiously awaiting pathology.  Confirmed her post-op appointment on 2/10.

## 2025-02-03 ENCOUNTER — TELEMEDICINE (OUTPATIENT)
Dept: HEMATOLOGY/ONCOLOGY | Facility: HOSPITAL | Age: 30
End: 2025-02-03
Payer: COMMERCIAL

## 2025-02-03 ENCOUNTER — APPOINTMENT (OUTPATIENT)
Dept: HEMATOLOGY/ONCOLOGY | Facility: HOSPITAL | Age: 30
End: 2025-02-03
Payer: COMMERCIAL

## 2025-02-03 DIAGNOSIS — Z31.62 ENCOUNTER FOR FERTILITY PRESERVATION COUNSELING: ICD-10-CM

## 2025-02-03 DIAGNOSIS — Z91.89 AT RISK FOR INFERTILITY: ICD-10-CM

## 2025-02-03 DIAGNOSIS — C50.511 MALIGNANT NEOPLASM OF LOWER-OUTER QUADRANT OF RIGHT BREAST OF FEMALE, ESTROGEN RECEPTOR POSITIVE: Primary | ICD-10-CM

## 2025-02-03 DIAGNOSIS — Z17.0 MALIGNANT NEOPLASM OF LOWER-OUTER QUADRANT OF RIGHT BREAST OF FEMALE, ESTROGEN RECEPTOR POSITIVE: Primary | ICD-10-CM

## 2025-02-03 DIAGNOSIS — Z17.0 MALIGNANT NEOPLASM OF NIPPLE OF RIGHT BREAST IN FEMALE, ESTROGEN RECEPTOR POSITIVE: ICD-10-CM

## 2025-02-03 DIAGNOSIS — C50.011 MALIGNANT NEOPLASM OF NIPPLE OF RIGHT BREAST IN FEMALE, ESTROGEN RECEPTOR POSITIVE: ICD-10-CM

## 2025-02-03 PROCEDURE — 99417 PROLNG OP E/M EACH 15 MIN: CPT | Performed by: NURSE PRACTITIONER

## 2025-02-03 PROCEDURE — 99215 OFFICE O/P EST HI 40 MIN: CPT | Performed by: NURSE PRACTITIONER

## 2025-02-03 NOTE — PROGRESS NOTES
Patient ID: Ligia Franklin is a 29 y.o. female.  Referring Physician: Addis العلي,   [id number] [facility], [address] [place] [address]  Primary Care Provider: JOEY Ponce-CNP  Virtual or Telephone Consent    An interactive audio and video telecommunication system which permits real time communications between the patient (HOME) and provider at [facility] was utilized to provide this telehealth service.   Verbal consent was requested and obtained from patient on this date, 02/03/25 for a telehealth visit.    Oncologic History:  -02/2023: new onset brown nipple discharge after birth of son while breastfeeding  -02/09/2023: R breast core biopsy pathology consistent with DCIS ER+95%, WV+35%  -03/31/2023: bilateral skin sparing mastectomy and right sentinel lymph node biopsy (0/3), DCIS only spanning 5cm on path. Margins negative. Focal 1mm anterior margin. Left skin sparing mastectomy showed benign findings, concurrent reconstruction with bilateral prepectoral direct saline implants.  -04/2023 Reviewed at tumor board and no re-excision recommended and no RT. Given DCIS findings alone and s/p bilateral mastectomy tumor there was not indication for endocrine therapy  -10/2024 follow up with plastics d/t discomfort with implants and concern re large size  -11/12/24 bilateral revision reconstruction with implant exchange and fat grafting, pathology consistent with IDC G3, measuring 5mm in greatest dimension involving fibrous and fibroadipose tissue with DCIS and margins could not be assessed  -12/20/24 re-excision with two foci of IDC with DCIS with a positive new inferior margin. Final tumor size 12 mm overall grade 2. pT1cNx. Oncotype completed and found to have RS 31.   -01/22/24 re-excision/revision of R breast reconstruction and R breast capsulotomy    Subjective    HPI  Referred today by: Dr. العلي    Oncologic Hx: as above, ER+/WV-, HER2-  Planned  treatment: Docetaxel/Cyclophosphamide planned to start 02/17/25    HPI: Patient presents today with  to discuss options related to fertility preservation in the context of recurrent breast cancer with plans for systemic chemotherapy  PMH: no medical history, taking vitamins,   Psych History: anxiety - on well-butrin inconsistently, under guidance of PCP takes PRN  Surg Hx: breast cancer/revisions as above    OB History:  G 1 P1 - son born at 39 1/2 weeks gestation, vaginal delivery, no pregnancy complications, delivery complications included fractured pelvis, son with trauma from reduced size of birth canal trauma      Menstrual/Gyn History: menarche 16  LMP: 01/12/25  Cycle Length: monthly                                  Premenstrual spotting: no                    Dysmenorrhea: no   Dyspareunia: no  Abnl paps: no  Last pap: 12/2024  STD: no  Contraception: copper IUD, birth control pills teenager for acne ~ 10 years   Hx PCOS: no  Hx cysts: no  Hx endometriosis: no  Hx fibroids: no  Previous mammogram: no  Sexual activity: yes   Gender/Sexual orientation: Female, straight    Endocrine/Infertility History:  Hx of infertility: no  Nipple Discharge: not outside breast cancer dx  Vision changes / headaches: no  Excess hair growth: yes  Acne/oily skin: yes  Recent weight change: no  Exercise: no    Family History: Adopted  Only known family history due to being adopted is her grandmother has breast cancer of unknown type. >60 years  Fathers side maternal cancer of unk etiology 50's  Amish heritage: unk  Ethnic background: unk  Breast ca: PGM breast cancer >60 years  PA cancer of unk etiology 50's  Genetic testing: patient with BRCA-2 mutation    Social History: Currently living in [place] with  of 8 years, they have 3 y.o. son Elias. 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 They enjoy traveling, reading, shopping. Never tobacco, recreational drugs, or ETOH.    Partner History:  Name:  Jemal Fernandes  PMH: None  PGM stomach ca - 50's/60's  MA pancreas   MA lung <60  MU lung  Pregnancy loss - mother  Medications: None  Previous SA: no  Previous pregnancies: 1 son  Issues with intercourse: no    Review of Systems - Oncology     Objective   BSA: There is no height or weight on file to calculate BSA.  There were no vitals taken for this visit. No VS or weight recorded d/t telehealth nature of this visit  Performance Status:  Asymptomatic    Current Outpatient Medications   Medication Instructions    ascorbic acid (VITAMIN C) 500 mg, Daily    buPROPion XL (WELLBUTRIN XL) 300 mg, oral, Every morning, Do not crush, chew, or split.    copper (ParaGard T 380A) 380 square mm IUD by intrauterine route. PER DIRECTED    docusate sodium (COLACE) 100 mg, oral, 2 times daily PRN    fluconazole (DIFLUCAN) 150 mg, oral, Every 3 days    ondansetron (ZOFRAN) 8 mg, oral, Every 8 hours PRN    ondansetron ODT (ZOFRAN-ODT) 4 mg, oral, Every 8 hours PRN    prochlorperazine (COMPAZINE) 10 mg, oral, Every 6 hours PRN    zinc gluconate 50 mg tablet Daily       Family History   Problem Relation Name Age of Onset    Other (RIGHT VENTRICULAR DYSPLASIA) Mother      Cancer Father Dont know     Breast cancer Maternal Grandmother       Oncology History   Malignant neoplasm of female breast   6/5/2024 Initial Diagnosis    Malignant neoplasm of female breast     12/10/2024 Cancer Staged    Staging form: Breast, AJCC 8th Edition, Clinical: Stage IB (cT1, cN0, cM0, G3, ER+, AK-, HER2-) - Signed by Addis العلي DO on 12/10/2024     2/17/2025 -  Chemotherapy    TC (DOCEtaxel / CycloPHOSphamide), 21 Day Cycles       Ligia Franklin  reports that she has never smoked. She has never used smokeless tobacco.  She  reports no history of alcohol use.  She  reports no history of drug use.    Physical Exam  Constitutional:       General: She is not in acute distress.     Appearance: Normal appearance. She is normal weight. She is not  ill-appearing.   Neurological:      General: No focal deficit present.      Mental Status: She is alert and oriented to person, place, and time.   Psychiatric:         Mood and Affect: Mood normal.         Behavior: Behavior normal.         Thought Content: Thought content normal.         Judgment: Judgment normal.       No visits with results within 1 Week(s) from this visit.   Latest known visit with results is:   Admission on 01/22/2025, Discharged on 01/22/2025   Component Date Value Ref Range Status    HCG, Urine 01/22/2025 NEGATIVE  NEGATIVE Final    Case Report 01/22/2025    Final                    Value:Surgical Pathology                                Case: [id number]                                  Authorizing Provider:  Addis العلي DO       Collected:           01/22/2025 1251              Ordering Location:     [facility] Received:            01/22/2025 1307                                     OR                                                                           Pathologist:           Zulema Almonte MD, MS                                                            Specimen:    BREAST MARGIN RIGHT, RIGHT BREAST INFERIOR MARGIN RE-EXCISION - INK MENA NEW                       INFERIOR MARGIN                                                                            FINAL DIAGNOSIS 01/22/2025    Final                    Value:A. Right breast margin reexcision:  - Benign skin, with changes consistent with previous surgical procedure.        01/22/2025    Final                    Value:By the signature on this report, the individual or group listed as making the Final Interpretation/Diagnosis certifies that they have reviewed this case.       Resident Review 01/22/2025    Final                    Value:The gross and/or microscopic findings were reviewed in conjunction with pathology resident Yan Brady MD.      Clinical History 01/22/2025    Final                     "Value:Pre-op diagnosis:  Malignant neoplasm of lower-outer quadrant of right breast of female, estrogen receptor positive [C50.511, Z17.0]      Gross Description 01/22/2025    Final                    Value:A: Received in formalin, labeled with the patient's name and hospital number and \"right breast inferior margin\", is an irregular segment of yellow lobulated fibrofatty tissue, 6.5 x 1.4 x 0.8 cm.  The new inferior margin has been previously inked black.  The specimen is serially sectioned to reveal grossly unremarkable cut surfaces.  No lesion is identified grossly.  The specimen is submitted entirely in 6 cassettes.  JPB    NOTE:  Ischemia time: Not provided.  This specimen was placed into formalin at: 1256 1/22/2025.       Assessment/Plan    Ligia Franklin is a 29 y.o. F with recurrent carcinoma of the R breast ER+/MN-/HER2- s/p multiple surgical interventions/re-excisions with plans for systemic chemotherapy with Docetaxel and Cyclophosphamide    #Risk for infertility  - Discussed the damaging effect cancer treatment can have on the ovaries.   - Discussed natural age related decline in fertility and menopause that occurs as a result of ovarian aging, and that cancer treatments can accellerate that progression and diminish ovarian reserve.  - Individuals may experience varying degrees of short or long term ovarian dysfunction and amenorrhea, and that this is dependent upon type of cancer treatment, cumulative dose, and patients age.   - Loss of ovarian function may be permanent or temporary.  - Amenorrhea may be temporary or permanent -- temporary amenorrhea results from destruction of maturing follicles whereas permanent amenorrhea results from depletion of viable primordial follicles.  - Risk to fertility is LOW/INTERMEDIATE based on cancer treatment of Docetaxel/Cyclophosphamide - cyclophosphamide 600mg/m2 x 6 = 2400mg below threshold, age and prolonged nature of therapy, BRCA mutation all play a role in this " risk estimation  - Discussed basics of ovarian stimulation and the process of oocyte retrieval.   - Discussed limitations with reproductive technology and that there is not a guarantee for live birth if we attempt fertility preservation.  - Discussed ovarian/menstrual suppression with GnRH antagonists, specifically discussed results from POEMS study which demonstrated increased rate of live birth and hormone function in women with breast cancer that received ovarian suppression compared to those that did not - do recommend use of ovarian suppression with chemotherapy such as lupron or goserelin  - Discussed challenges with prolonged endocrine therapy - she may not TTC while on endocrine therapy; after 18 mo of therapy she may pause treatment and attempt to conceive, we specifically discussed results from the POSITIVE trial which demonstrated no increased risk for recurrence or negative health implications from pregnancy in short term (5 year follow up data).   - We further discussed the option to try and conceive naturally after pausing endocrine therapy, however most individuals on the study did use material they had previously banked to achieve pregnancy  - Regardless of decision to preserve fertility we discussed a plan to capture baseline hormone function with FSH, LH, E2, AMH    #Sexual dysfunction/contraception:  - Will discuss at follow up    #Psychosocial: young adult with cancer, hx of anxiety, parent to young child  - Will discuss resources available at follow up     The amount of time that I spent with the patient:  Prep: 5  Time spend directly with patient: 80  Coordinating care:   Documentation: 10  Other time:    Total time spent on encounter: 95                   JOEY Cook-CNP

## 2025-02-04 ENCOUNTER — TELEPHONE (OUTPATIENT)
Dept: HEMATOLOGY/ONCOLOGY | Facility: HOSPITAL | Age: 30
End: 2025-02-04
Payer: COMMERCIAL

## 2025-02-04 NOTE — TELEPHONE ENCOUNTER
Contacted patient in follow up regarding fertility preservation. She has elected to not proceed with fertility preservation primarily related to time to treatment, added stress of going through a cycle of ovarian stimulation, and for personal/Yazdanism views regarding life at conception. She was agreeable to baseline hormone function testing, use of ovarian/menstrual suppression, and follow up.

## 2025-02-10 ENCOUNTER — TELEPHONE (OUTPATIENT)
Dept: HEMATOLOGY/ONCOLOGY | Facility: CLINIC | Age: 30
End: 2025-02-10

## 2025-02-10 ENCOUNTER — OFFICE VISIT (OUTPATIENT)
Dept: SURGICAL ONCOLOGY | Facility: HOSPITAL | Age: 30
End: 2025-02-10
Payer: COMMERCIAL

## 2025-02-10 VITALS — BODY MASS INDEX: 24.59 KG/M2 | WEIGHT: 144 LBS | RESPIRATION RATE: 16 BRPM | HEIGHT: 64 IN

## 2025-02-10 DIAGNOSIS — Z17.0 MALIGNANT NEOPLASM OF NIPPLE OF RIGHT BREAST IN FEMALE, ESTROGEN RECEPTOR POSITIVE: ICD-10-CM

## 2025-02-10 DIAGNOSIS — Z17.0 MALIGNANT NEOPLASM OF LOWER-OUTER QUADRANT OF RIGHT BREAST OF FEMALE, ESTROGEN RECEPTOR POSITIVE: ICD-10-CM

## 2025-02-10 DIAGNOSIS — C50.011 MALIGNANT NEOPLASM OF NIPPLE OF RIGHT BREAST IN FEMALE, ESTROGEN RECEPTOR POSITIVE: ICD-10-CM

## 2025-02-10 DIAGNOSIS — C50.511 MALIGNANT NEOPLASM OF LOWER-OUTER QUADRANT OF RIGHT BREAST OF FEMALE, ESTROGEN RECEPTOR POSITIVE: ICD-10-CM

## 2025-02-10 PROCEDURE — 1036F TOBACCO NON-USER: CPT | Performed by: SURGERY

## 2025-02-10 PROCEDURE — 99211 OFF/OP EST MAY X REQ PHY/QHP: CPT | Performed by: SURGERY

## 2025-02-10 PROCEDURE — 3008F BODY MASS INDEX DOCD: CPT | Performed by: SURGERY

## 2025-02-10 RX ORDER — DIPHENHYDRAMINE HYDROCHLORIDE 50 MG/ML
50 INJECTION INTRAMUSCULAR; INTRAVENOUS AS NEEDED
Status: CANCELLED | OUTPATIENT
Start: 2025-02-10

## 2025-02-10 RX ORDER — ALBUTEROL SULFATE 0.83 MG/ML
3 SOLUTION RESPIRATORY (INHALATION) AS NEEDED
Status: CANCELLED | OUTPATIENT
Start: 2025-02-10

## 2025-02-10 RX ORDER — FAMOTIDINE 10 MG/ML
20 INJECTION, SOLUTION INTRAVENOUS ONCE AS NEEDED
Status: CANCELLED | OUTPATIENT
Start: 2025-02-10

## 2025-02-10 RX ORDER — EPINEPHRINE 0.3 MG/.3ML
0.3 INJECTION SUBCUTANEOUS EVERY 5 MIN PRN
Status: CANCELLED | OUTPATIENT
Start: 2025-02-10

## 2025-02-10 NOTE — TELEPHONE ENCOUNTER
I called and spoke with Ligia about ovarian suppression. Education provided on Lupron injections, frequency and how it is administered. All questions were answered and Ligia was agreeable to come tomorrow at 3:00. Ligia is aware that we will call her if she is not approved to receive Lupron tomorrow as we are waiting for insurance approval. She was appreciative and knows if she does not hear from our office that she can come in as planned tomorrow 02/11 at 3:00

## 2025-02-10 NOTE — PROGRESS NOTES
BREAST SURGERY POST OPERATIVE VISIT    Assessment/Plan     1. 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  Right breast IDC g3 ER+ [place]- HER2- largest focus measuring 1.2cm        We reviewed the pathology results from surgery and the patient was provided with a copy of the pathology report.    The cancer has been excised to negative margins.  Her surgical treatment is complete.    Established with med/onc.  Starting TC next Monday.  Had discussion with CADEN.      Referred to rad/onc to discuss PMRT.    I will follow up with me in June for clinical exam or sooner for any breast concerns.    Trevin Franklin is a 29 y.o. female here for post op visit s/p right WLE residual disease on the right.    Date of surgery: 1/22/2025  Pathology     FINAL DIAGNOSIS   A. Right breast margin reexcision:  - Benign skin, with changes consistent with previous surgical procedure.       Objective   Physical Exam  General: Alert and oriented x 3.  Mood and affect are appropriate.  HEENT: EOMI, PERRLA.   Neck: supple, no masses, no cervical adenopathy.  Cardiovascular: no lower extremity edema.  Pulmonary: breathing non labored on room air.  GI: Abdomen soft, no masses. No hepatomegaly or splenomegaly.  Lymph nodes: No supraclavicular or axillary adenopathy bilaterally.  Musculoskeletal: Full range of motion in the upper extremities bilaterally.  Neuro: denies dizziness, tremors    Breasts: The breasts were examined in both the seated and supine positions.    RIGHT: surgically absent with implant based reconstruction. There are no skin changes, skin thickening, or dimpling. There are no masses palpated in the RIGHT breast.   LEFT: surgically absent with implant based reconstruction. There are no skin changes, skin thickening, or dimpling. There  are no masses palpated in the LEFT breast.     Radiology review: All images and reports were personally reviewed.         Addis العلي, DO

## 2025-02-11 ENCOUNTER — LAB (OUTPATIENT)
Dept: LAB | Facility: CLINIC | Age: 30
End: 2025-02-11
Payer: COMMERCIAL

## 2025-02-11 ENCOUNTER — INFUSION (OUTPATIENT)
Dept: HEMATOLOGY/ONCOLOGY | Facility: CLINIC | Age: 30
End: 2025-02-11
Payer: COMMERCIAL

## 2025-02-11 VITALS
WEIGHT: 154.32 LBS | HEART RATE: 93 BPM | TEMPERATURE: 98.1 F | OXYGEN SATURATION: 98 % | RESPIRATION RATE: 16 BRPM | BODY MASS INDEX: 26.49 KG/M2 | SYSTOLIC BLOOD PRESSURE: 141 MMHG | DIASTOLIC BLOOD PRESSURE: 80 MMHG

## 2025-02-11 DIAGNOSIS — C50.011 MALIGNANT NEOPLASM OF NIPPLE OF RIGHT BREAST IN FEMALE, ESTROGEN RECEPTOR POSITIVE: ICD-10-CM

## 2025-02-11 DIAGNOSIS — Z91.89 AT RISK FOR INFERTILITY: Primary | ICD-10-CM

## 2025-02-11 DIAGNOSIS — Z17.0 MALIGNANT NEOPLASM OF NIPPLE OF RIGHT BREAST IN FEMALE, ESTROGEN RECEPTOR POSITIVE: ICD-10-CM

## 2025-02-11 DIAGNOSIS — Z91.89 AT RISK FOR INFERTILITY: ICD-10-CM

## 2025-02-11 DIAGNOSIS — C50.511 MALIGNANT NEOPLASM OF LOWER-OUTER QUADRANT OF RIGHT BREAST OF FEMALE, ESTROGEN RECEPTOR POSITIVE: ICD-10-CM

## 2025-02-11 DIAGNOSIS — Z17.0 MALIGNANT NEOPLASM OF LOWER-OUTER QUADRANT OF RIGHT BREAST OF FEMALE, ESTROGEN RECEPTOR POSITIVE: ICD-10-CM

## 2025-02-11 LAB
ALBUMIN SERPL BCP-MCNC: 4.4 G/DL (ref 3.4–5)
ALP SERPL-CCNC: 52 U/L (ref 33–110)
ALT SERPL W P-5'-P-CCNC: 15 U/L (ref 7–45)
ANION GAP SERPL CALC-SCNC: 10 MMOL/L (ref 10–20)
AST SERPL W P-5'-P-CCNC: 13 U/L (ref 9–39)
B-HCG SERPL-ACNC: <2 MIU/ML
BASOPHILS # BLD AUTO: 0.03 X10*3/UL (ref 0–0.1)
BASOPHILS NFR BLD AUTO: 0.5 %
BILIRUB SERPL-MCNC: 0.3 MG/DL (ref 0–1.2)
BUN SERPL-MCNC: 14 MG/DL (ref 6–23)
CALCIUM SERPL-MCNC: 8.9 MG/DL (ref 8.6–10.3)
CHLORIDE SERPL-SCNC: 106 MMOL/L (ref 98–107)
CO2 SERPL-SCNC: 27 MMOL/L (ref 21–32)
CREAT SERPL-MCNC: 0.84 MG/DL (ref 0.5–1.05)
EGFRCR SERPLBLD CKD-EPI 2021: >90 ML/MIN/1.73M*2
EOSINOPHIL # BLD AUTO: 0.22 X10*3/UL (ref 0–0.7)
EOSINOPHIL NFR BLD AUTO: 3.3 %
ERYTHROCYTE [DISTWIDTH] IN BLOOD BY AUTOMATED COUNT: 13 % (ref 11.5–14.5)
GLUCOSE SERPL-MCNC: 99 MG/DL (ref 74–99)
HCT VFR BLD AUTO: 39.3 % (ref 36–46)
HGB BLD-MCNC: 13.1 G/DL (ref 12–16)
IMM GRANULOCYTES # BLD AUTO: 0.01 X10*3/UL (ref 0–0.7)
IMM GRANULOCYTES NFR BLD AUTO: 0.2 % (ref 0–0.9)
LYMPHOCYTES # BLD AUTO: 2.12 X10*3/UL (ref 1.2–4.8)
LYMPHOCYTES NFR BLD AUTO: 32.3 %
MCH RBC QN AUTO: 31 PG (ref 26–34)
MCHC RBC AUTO-ENTMCNC: 33.3 G/DL (ref 32–36)
MCV RBC AUTO: 93 FL (ref 80–100)
MONOCYTES # BLD AUTO: 0.43 X10*3/UL (ref 0.1–1)
MONOCYTES NFR BLD AUTO: 6.5 %
NEUTROPHILS # BLD AUTO: 3.76 X10*3/UL (ref 1.2–7.7)
NEUTROPHILS NFR BLD AUTO: 57.2 %
PLATELET # BLD AUTO: 260 X10*3/UL (ref 150–450)
POTASSIUM SERPL-SCNC: 4.7 MMOL/L (ref 3.5–5.3)
PROT SERPL-MCNC: 7.1 G/DL (ref 6.4–8.2)
RBC # BLD AUTO: 4.22 X10*6/UL (ref 4–5.2)
SODIUM SERPL-SCNC: 138 MMOL/L (ref 136–145)
WBC # BLD AUTO: 6.6 X10*3/UL (ref 4.4–11.3)

## 2025-02-11 PROCEDURE — 86706 HEP B SURFACE ANTIBODY: CPT

## 2025-02-11 PROCEDURE — 82670 ASSAY OF TOTAL ESTRADIOL: CPT

## 2025-02-11 PROCEDURE — 36415 COLL VENOUS BLD VENIPUNCTURE: CPT

## 2025-02-11 PROCEDURE — 87340 HEPATITIS B SURFACE AG IA: CPT

## 2025-02-11 PROCEDURE — 2500000004 HC RX 250 GENERAL PHARMACY W/ HCPCS (ALT 636 FOR OP/ED): Mod: JZ,TB | Performed by: INTERNAL MEDICINE

## 2025-02-11 PROCEDURE — 86300 IMMUNOASSAY TUMOR CA 15-3: CPT

## 2025-02-11 PROCEDURE — 86704 HEP B CORE ANTIBODY TOTAL: CPT

## 2025-02-11 PROCEDURE — 83002 ASSAY OF GONADOTROPIN (LH): CPT

## 2025-02-11 PROCEDURE — 83001 ASSAY OF GONADOTROPIN (FSH): CPT

## 2025-02-11 PROCEDURE — 85025 COMPLETE CBC W/AUTO DIFF WBC: CPT

## 2025-02-11 PROCEDURE — 96401 CHEMO ANTI-NEOPL SQ/IM: CPT

## 2025-02-11 PROCEDURE — 84702 CHORIONIC GONADOTROPIN TEST: CPT

## 2025-02-11 PROCEDURE — 80053 COMPREHEN METABOLIC PANEL: CPT

## 2025-02-11 PROCEDURE — 83520 IMMUNOASSAY QUANT NOS NONAB: CPT

## 2025-02-11 RX ORDER — DIPHENHYDRAMINE HYDROCHLORIDE 50 MG/ML
50 INJECTION INTRAMUSCULAR; INTRAVENOUS AS NEEDED
OUTPATIENT
Start: 2025-03-11

## 2025-02-11 RX ORDER — ALBUTEROL SULFATE 0.83 MG/ML
3 SOLUTION RESPIRATORY (INHALATION) AS NEEDED
OUTPATIENT
Start: 2025-03-11

## 2025-02-11 RX ORDER — EPINEPHRINE 0.3 MG/.3ML
0.3 INJECTION SUBCUTANEOUS EVERY 5 MIN PRN
OUTPATIENT
Start: 2025-03-11

## 2025-02-11 RX ORDER — FAMOTIDINE 10 MG/ML
20 INJECTION INTRAVENOUS ONCE AS NEEDED
OUTPATIENT
Start: 2025-03-11

## 2025-02-11 RX ADMIN — LEUPROLIDE ACETATE 3.75 MG: KIT at 08:30

## 2025-02-11 ASSESSMENT — PAIN SCALES - GENERAL: PAINLEVEL_OUTOF10: 0-NO PAIN

## 2025-02-12 LAB
CANCER AG27-29 SERPL-ACNC: 10.2 U/ML (ref 0–38.6)
ESTRADIOL SERPL-MCNC: 36 PG/ML
FSH SERPL-ACNC: 7.6 IU/L
HBV CORE AB SER QL: NONREACTIVE
HBV SURFACE AB SER-ACNC: 5.2 MIU/ML
HBV SURFACE AG SERPL QL IA: NONREACTIVE
LH SERPL-ACNC: 11.6 IU/L

## 2025-02-14 LAB — MIS SERPL-MCNC: 7.97 NG/ML (ref 0.4–16.02)

## 2025-02-17 ENCOUNTER — INFUSION (OUTPATIENT)
Dept: HEMATOLOGY/ONCOLOGY | Facility: CLINIC | Age: 30
End: 2025-02-17
Payer: COMMERCIAL

## 2025-02-17 VITALS
RESPIRATION RATE: 16 BRPM | HEART RATE: 90 BPM | BODY MASS INDEX: 26.47 KG/M2 | OXYGEN SATURATION: 97 % | WEIGHT: 154.21 LBS | TEMPERATURE: 98.8 F | DIASTOLIC BLOOD PRESSURE: 75 MMHG | SYSTOLIC BLOOD PRESSURE: 123 MMHG

## 2025-02-17 DIAGNOSIS — D05.11 DUCTAL CARCINOMA IN SITU (DCIS) OF RIGHT BREAST: ICD-10-CM

## 2025-02-17 DIAGNOSIS — C50.011 MALIGNANT NEOPLASM OF NIPPLE OF RIGHT BREAST IN FEMALE, ESTROGEN RECEPTOR POSITIVE: Primary | ICD-10-CM

## 2025-02-17 DIAGNOSIS — Z17.0 MALIGNANT NEOPLASM OF NIPPLE OF RIGHT BREAST IN FEMALE, ESTROGEN RECEPTOR POSITIVE: Primary | ICD-10-CM

## 2025-02-17 PROCEDURE — 2500000001 HC RX 250 WO HCPCS SELF ADMINISTERED DRUGS (ALT 637 FOR MEDICARE OP): Performed by: INTERNAL MEDICINE

## 2025-02-17 PROCEDURE — 96413 CHEMO IV INFUSION 1 HR: CPT

## 2025-02-17 PROCEDURE — 2500000004 HC RX 250 GENERAL PHARMACY W/ HCPCS (ALT 636 FOR OP/ED)

## 2025-02-17 PROCEDURE — 96377 APPLICATON ON-BODY INJECTOR: CPT

## 2025-02-17 PROCEDURE — 2500000004 HC RX 250 GENERAL PHARMACY W/ HCPCS (ALT 636 FOR OP/ED): Performed by: INTERNAL MEDICINE

## 2025-02-17 PROCEDURE — 2500000004 HC RX 250 GENERAL PHARMACY W/ HCPCS (ALT 636 FOR OP/ED): Mod: JZ,TB | Performed by: INTERNAL MEDICINE

## 2025-02-17 PROCEDURE — 96375 TX/PRO/DX INJ NEW DRUG ADDON: CPT | Mod: INF

## 2025-02-17 PROCEDURE — 96417 CHEMO IV INFUS EACH ADDL SEQ: CPT

## 2025-02-17 RX ORDER — DIPHENHYDRAMINE HCL 25 MG
25 CAPSULE ORAL ONCE
Status: COMPLETED | OUTPATIENT
Start: 2025-02-17 | End: 2025-02-17

## 2025-02-17 RX ORDER — EPINEPHRINE 0.3 MG/.3ML
0.3 INJECTION SUBCUTANEOUS EVERY 5 MIN PRN
Status: DISCONTINUED | OUTPATIENT
Start: 2025-02-17 | End: 2025-02-17 | Stop reason: HOSPADM

## 2025-02-17 RX ORDER — PROCHLORPERAZINE EDISYLATE 5 MG/ML
10 INJECTION INTRAMUSCULAR; INTRAVENOUS EVERY 6 HOURS PRN
Status: DISCONTINUED | OUTPATIENT
Start: 2025-02-17 | End: 2025-02-17 | Stop reason: HOSPADM

## 2025-02-17 RX ORDER — DEXAMETHASONE 6 MG/1
12 TABLET ORAL ONCE
Status: COMPLETED | OUTPATIENT
Start: 2025-02-17 | End: 2025-02-17

## 2025-02-17 RX ORDER — FAMOTIDINE 10 MG/ML
20 INJECTION, SOLUTION INTRAVENOUS ONCE AS NEEDED
Status: DISCONTINUED | OUTPATIENT
Start: 2025-02-17 | End: 2025-02-17 | Stop reason: HOSPADM

## 2025-02-17 RX ORDER — HEPARIN SODIUM,PORCINE/PF 10 UNIT/ML
50 SYRINGE (ML) INTRAVENOUS AS NEEDED
OUTPATIENT
Start: 2025-02-17

## 2025-02-17 RX ORDER — PALONOSETRON 0.05 MG/ML
0.25 INJECTION, SOLUTION INTRAVENOUS ONCE
Status: COMPLETED | OUTPATIENT
Start: 2025-02-17 | End: 2025-02-17

## 2025-02-17 RX ORDER — ALBUTEROL SULFATE 0.83 MG/ML
3 SOLUTION RESPIRATORY (INHALATION) AS NEEDED
Status: DISCONTINUED | OUTPATIENT
Start: 2025-02-17 | End: 2025-02-17 | Stop reason: HOSPADM

## 2025-02-17 RX ORDER — HEPARIN 100 UNIT/ML
500 SYRINGE INTRAVENOUS AS NEEDED
OUTPATIENT
Start: 2025-02-17

## 2025-02-17 RX ORDER — PROCHLORPERAZINE MALEATE 10 MG
10 TABLET ORAL EVERY 6 HOURS PRN
Status: DISCONTINUED | OUTPATIENT
Start: 2025-02-17 | End: 2025-02-17 | Stop reason: HOSPADM

## 2025-02-17 RX ORDER — HEPARIN SODIUM,PORCINE/PF 10 UNIT/ML
50 SYRINGE (ML) INTRAVENOUS AS NEEDED
Status: DISCONTINUED | OUTPATIENT
Start: 2025-02-17 | End: 2025-02-17 | Stop reason: HOSPADM

## 2025-02-17 RX ORDER — DIPHENHYDRAMINE HYDROCHLORIDE 50 MG/ML
50 INJECTION INTRAMUSCULAR; INTRAVENOUS AS NEEDED
Status: DISCONTINUED | OUTPATIENT
Start: 2025-02-17 | End: 2025-02-17 | Stop reason: HOSPADM

## 2025-02-17 RX ORDER — HEPARIN 100 UNIT/ML
500 SYRINGE INTRAVENOUS AS NEEDED
Status: DISCONTINUED | OUTPATIENT
Start: 2025-02-17 | End: 2025-02-17 | Stop reason: HOSPADM

## 2025-02-17 RX ADMIN — DEXAMETHASONE 12 MG: 6 TABLET ORAL at 09:45

## 2025-02-17 RX ADMIN — CYCLOPHOSPHAMIDE 1000 MG: 1 INJECTION, POWDER, FOR SOLUTION INTRAVENOUS; ORAL at 12:10

## 2025-02-17 RX ADMIN — DIPHENHYDRAMINE HYDROCHLORIDE 25 MG: 25 CAPSULE ORAL at 09:45

## 2025-02-17 RX ADMIN — PALONOSETRON HYDROCHLORIDE 250 MCG: 0.25 INJECTION INTRAVENOUS at 10:35

## 2025-02-17 RX ADMIN — PEGFILGRASTIM 6 MG: KIT SUBCUTANEOUS at 14:25

## 2025-02-17 RX ADMIN — DOCETAXEL 130 MG: 20 INJECTION, SOLUTION INTRAVENOUS at 10:57

## 2025-02-17 ASSESSMENT — PAIN SCALES - GENERAL: PAINLEVEL_OUTOF10: 0-NO PAIN

## 2025-02-18 ENCOUNTER — INFUSION (OUTPATIENT)
Dept: HEMATOLOGY/ONCOLOGY | Facility: CLINIC | Age: 30
End: 2025-02-18
Payer: COMMERCIAL

## 2025-02-18 ENCOUNTER — OFFICE VISIT (OUTPATIENT)
Dept: HEMATOLOGY/ONCOLOGY | Facility: CLINIC | Age: 30
End: 2025-02-18
Payer: COMMERCIAL

## 2025-02-18 ENCOUNTER — TELEPHONE (OUTPATIENT)
Dept: HEMATOLOGY/ONCOLOGY | Facility: CLINIC | Age: 30
End: 2025-02-18
Payer: COMMERCIAL

## 2025-02-18 ENCOUNTER — PATIENT MESSAGE (OUTPATIENT)
Dept: HEMATOLOGY/ONCOLOGY | Facility: CLINIC | Age: 30
End: 2025-02-18
Payer: COMMERCIAL

## 2025-02-18 VITALS
HEART RATE: 76 BPM | BODY MASS INDEX: 26.04 KG/M2 | WEIGHT: 151.68 LBS | SYSTOLIC BLOOD PRESSURE: 128 MMHG | DIASTOLIC BLOOD PRESSURE: 84 MMHG | RESPIRATION RATE: 16 BRPM | TEMPERATURE: 98.1 F | OXYGEN SATURATION: 98 %

## 2025-02-18 DIAGNOSIS — Z17.0 MALIGNANT NEOPLASM OF NIPPLE OF RIGHT BREAST IN FEMALE, ESTROGEN RECEPTOR POSITIVE: ICD-10-CM

## 2025-02-18 DIAGNOSIS — R51.9 ACUTE NONINTRACTABLE HEADACHE, UNSPECIFIED HEADACHE TYPE: ICD-10-CM

## 2025-02-18 DIAGNOSIS — R11.2 CHEMOTHERAPY INDUCED NAUSEA AND VOMITING: Primary | ICD-10-CM

## 2025-02-18 DIAGNOSIS — C50.011 MALIGNANT NEOPLASM OF NIPPLE OF RIGHT BREAST IN FEMALE, ESTROGEN RECEPTOR POSITIVE: ICD-10-CM

## 2025-02-18 DIAGNOSIS — T45.1X5A CHEMOTHERAPY INDUCED NAUSEA AND VOMITING: Primary | ICD-10-CM

## 2025-02-18 PROCEDURE — 96377 APPLICATON ON-BODY INJECTOR: CPT

## 2025-02-18 PROCEDURE — 96375 TX/PRO/DX INJ NEW DRUG ADDON: CPT | Mod: INF

## 2025-02-18 PROCEDURE — 99215 OFFICE O/P EST HI 40 MIN: CPT

## 2025-02-18 PROCEDURE — 96374 THER/PROPH/DIAG INJ IV PUSH: CPT | Mod: INF

## 2025-02-18 PROCEDURE — 96361 HYDRATE IV INFUSION ADD-ON: CPT | Mod: INF

## 2025-02-18 PROCEDURE — 99215 OFFICE O/P EST HI 40 MIN: CPT | Mod: 25

## 2025-02-18 PROCEDURE — 2500000004 HC RX 250 GENERAL PHARMACY W/ HCPCS (ALT 636 FOR OP/ED)

## 2025-02-18 RX ORDER — KETOROLAC TROMETHAMINE 30 MG/ML
30 INJECTION, SOLUTION INTRAMUSCULAR; INTRAVENOUS ONCE
Status: COMPLETED | OUTPATIENT
Start: 2025-02-18 | End: 2025-02-18

## 2025-02-18 RX ORDER — SCOPOLAMINE 1 MG/3D
1 PATCH, EXTENDED RELEASE TRANSDERMAL
Qty: 1 PATCH | Refills: 3 | Status: SHIPPED | OUTPATIENT
Start: 2025-02-18

## 2025-02-18 RX ORDER — DEXAMETHASONE 4 MG/1
TABLET ORAL
Qty: 48 TABLET | Refills: 0 | Status: SHIPPED | OUTPATIENT
Start: 2025-02-18

## 2025-02-18 RX ORDER — PROCHLORPERAZINE EDISYLATE 5 MG/ML
10 INJECTION INTRAMUSCULAR; INTRAVENOUS ONCE
Status: COMPLETED | OUTPATIENT
Start: 2025-02-18 | End: 2025-02-18

## 2025-02-18 RX ADMIN — KETOROLAC TROMETHAMINE 30 MG: 30 INJECTION, SOLUTION INTRAMUSCULAR; INTRAVENOUS at 14:10

## 2025-02-18 RX ADMIN — SODIUM CHLORIDE 1000 ML: 9 INJECTION, SOLUTION INTRAVENOUS at 13:51

## 2025-02-18 RX ADMIN — PROCHLORPERAZINE EDISYLATE 10 MG: 5 INJECTION INTRAMUSCULAR; INTRAVENOUS at 14:04

## 2025-02-18 ASSESSMENT — ENCOUNTER SYMPTOMS
CONSTIPATION: 0
COUGH: 0
DIZZINESS: 0
VOMITING: 1
EXTREMITY WEAKNESS: 0
CHEST TIGHTNESS: 0
HEMATOLOGIC/LYMPHATIC NEGATIVE: 1
DIARRHEA: 0
PSYCHIATRIC NEGATIVE: 1
EYE PROBLEMS: 0
MUSCULOSKELETAL NEGATIVE: 1
NAUSEA: 1
FATIGUE: 0
ABDOMINAL PAIN: 0
EYES NEGATIVE: 1
CHILLS: 0
SHORTNESS OF BREATH: 0
LIGHT-HEADEDNESS: 0
UNEXPECTED WEIGHT CHANGE: 0
APPETITE CHANGE: 1
ENDOCRINE NEGATIVE: 1
FEVER: 0
HEADACHES: 1
SPEECH DIFFICULTY: 0
PALPITATIONS: 0

## 2025-02-18 ASSESSMENT — PAIN SCALES - GENERAL: PAINLEVEL_OUTOF10: 10-WORST PAIN EVER

## 2025-02-18 NOTE — TELEPHONE ENCOUNTER
Patient had first round of chemo yesterday.  Went to bed with a slight headache and woke up with an even worse headache.  Calling to see what she is able to take?  Also sent a patient message.

## 2025-02-18 NOTE — TELEPHONE ENCOUNTER
"I called and spoke with Ligia. I did let her know that I spoke with Dr. Garcia and updated her on her current symptoms. (Please see phone note on 02/18). Per Dr. Garcia\" We can send her in Dex 8mg BID for 3 days (day after chemo). If she develops insomnia from the night dose she can discontinue and just take the 2 tablets in the AM.\" I told Ligia all information. She was appreciative and verbalized understanding. She did let me know that she did throw up her tylenol about 20 minutes after she took the dose (please see patient advice from 2/18). Education provided on how to take dex and Excedrin. She was very appreciative and will keep me posted on symptoms or if they are not improving.   "

## 2025-02-18 NOTE — PROGRESS NOTES
Patient ID: Ligia Franklin is a 29 y.o. female.  Diagnosis:  Right breast IDC   [facility]: Dr. Garcia    Visit Type: Sick Visit     Date of Service: 02/18/25  Location: [facility]     Current Therapy: TC    ONCOLOGIC HISTORY     Right breast IDC, stage 1 [id number], ER+95/ID+35, [id number] +BRCA2  - initially diagnosed after new brown nipple discharge during shower shortly after her son was born and was still breastfeeding  - Dx 2/9/2023 with 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 It was ER+95%, ID+35%  - Given her young age of onset and unknown biological history as she was adopted, she was genetically tested and tested positive for the BRCA2 mutation  -  3/31/2023 Dr. Addis العلي performed bilateeral skin sparing mastectomy and right sentinel lymph node biopsy (0/3), DCIS spanning 5cm on path.Focal 1mm anterior margin. Left skin sparing mastectomy showed benign findings. Dr. Jim Scruggs plastics did immediate reconstruction with bilateral prepectoral direct saline implants  - Reviewed at tumor board and no re-excision recommended and no RT  - Given DCIS findings alone and s/p bilateral mastectomy tumor there was not indication for endocrine therapy  - She also met with gyn/onc Dr. Tabitha Trevino and ppx oopherectomy was recommended when she was done with child bearing.  - follow up 10/21/24 for annual follow with plastics Dr Scruggs and reported discomfort with implants moving around and felt they were too large expressing interest in revision reconstruction  -  11/12/24 she underwent bilateral revision reconstruction with implant exchange, and fat grafting. Unfortunately on pathology she was found to have IDC G3, measuring 5mm in greatest dimension involving fibrous and fibroadipose tissue with DCIS and margins could not be assessed  - reexcision on 12/20/24 and noted to have two foci of IDC with DCIS with a positive new inferior margin. Final tumor size 12 mm overall  grade 2. pT1cNx  - Oncotype completed and found to have RS 31  - 1/22/25 - revision of right breast reconstruction, right breast capsulotomy, right breast margin re-excision and partial mastectomy   - Given oncotype score 31 and her young age adjuvant chemotherapy was recommended  - We discussed Taxotere + Cytoxan given node negative and also discussed AC+T however due to her schedule and concern for longer term toxicity, they are very reluctant and reviewed there is not strong data that anthracycline based therapy is needed at this time  - 2/3/25 - seen by Spring Ferrera for fertility preservation, elected to not proceed   - 2/17/25 - Cycle 1 Day 1 TC with scalp cooling     Subjective      INTERVAL HISTORY     Ligia Franklin is a 29 y.o. female who presents today for chief complaint of headache and nausea with vomiting. She was treated with TC with scalp cooling yesterday. Her appetite was poor yesterday but no nausea/vomiting. Yesterday evening after chemo she developed a headache. She thought it would go away after sleeping. She woke up this AM with a worsening headache. Attempted Tylenol, had vomiting shortly after. Unable to keep anything down today. Attempted dexamethasone as well. In the past she had scopolamine patches with surgery and did well. She remembers having zofran in the past without issues. She has not taken compazine or any antiemetic today. She did receive Aloxi yesterday. She typically drinks caffeine, did not have any today but did have an electrolyte drink yesterday with caffeine. She stayed hydrated yesterday but unable to tolerate anything by mouth today. Her headache is both sided, pounding, some light sensitivity, no other associated symptoms such as aura or vision changes. Denies dizziness, lightheadedness, vertigo. She does not have a history of migraines. Has a history of headaches but more mild than this, Wellbutrin helps with them, she did not take Wellbutrin yesterday, attempted today  but may not have received dose due to emesis. No other concerns, no signs of infections, no pain.     Review of Systems   Constitutional:  Positive for appetite change. Negative for chills, fatigue, fever and unexpected weight change.   HENT:  Negative.     Eyes: Negative.  Negative for eye problems.   Respiratory:  Negative for chest tightness, cough and shortness of breath.    Cardiovascular:  Negative for chest pain and palpitations.   Gastrointestinal:  Positive for nausea and vomiting. Negative for abdominal pain, constipation and diarrhea.   Endocrine: Negative.    Genitourinary: Negative.     Musculoskeletal: Negative.  Negative for gait problem.   Skin: Negative.    Neurological:  Positive for headaches. Negative for dizziness, extremity weakness, gait problem, light-headedness and speech difficulty.   Hematological: Negative.    Psychiatric/Behavioral: Negative.         Objective      There were no vitals taken for this visit. Stable in infusion   BSA: There is no height or weight on file to calculate BSA.    PHYSICAL EXAM   Physical Exam  Vitals reviewed.   Constitutional:       General: She is not in acute distress.     Appearance: Normal appearance. She is not toxic-appearing.   HENT:      Head: Normocephalic and atraumatic.   Eyes:      Pupils: Pupils are equal, round, and reactive to light.   Pulmonary:      Effort: Pulmonary effort is normal.   Musculoskeletal:      Cervical back: Normal range of motion.   Neurological:      General: No focal deficit present.      Mental Status: She is alert and oriented to person, place, and time.   Psychiatric:         Mood and Affect: Mood normal.         Behavior: Behavior normal.         Thought Content: Thought content normal.         Judgment: Judgment normal.         Allergies  No Known Allergies   Medications  Current Outpatient Medications   Medication Instructions    ascorbic acid (VITAMIN C) 500 mg, Daily    buPROPion XL (WELLBUTRIN XL) 300 mg, oral, Every  morning, Do not crush, chew, or split.    copper (ParaGard T 380A) 380 square mm IUD by intrauterine route. PER DIRECTED    dexAMETHasone (Decadron) 4 mg tablet Take 2 tablets in the AM and PM for 3 days starting the day after chemo    docusate sodium (COLACE) 100 mg, oral, 2 times daily PRN    fluconazole (DIFLUCAN) 150 mg, oral, Every 3 days    ondansetron (ZOFRAN) 8 mg, oral, Every 8 hours PRN    ondansetron ODT (ZOFRAN-ODT) 4 mg, oral, Every 8 hours PRN    prochlorperazine (COMPAZINE) 10 mg, oral, Every 6 hours PRN    scopolamine (Transderm-Scop) 1 mg over 3 days patch 3 day 1 patch, transdermal, Every 72 hours    zinc gluconate 50 mg tablet Daily        Diagnostic Results   RESULTS   No results found for this or any previous visit (from the past 96 hours).    None today. WNL prior to treatment on 2/11/2025.     Assessment/Plan   ASSESSMENT     Ligia Franklin is a 29 y.o. female with right breast IDC ER/ND +, HER2 negative, BRCA 2 pT1cNx s/p surgery on 1/22/2025 and adjuvant TC starting yesterday who presents as a sick visit with a chief complaint of  headache with asscoiated nausea and vomiting. Patient of Dr. Garcia.     Patient developed pounding, bilateral headache last night, day of chemo/scalp cooling. Persisted this AM, worsening, light sensitivity, no aura, no lightheadedness/dizziness/vertigo. Now with associated nausea and emesis. Attempted her Wellbutrin, Tylenol, and dex - emesis shortly after. She did not take any antiemetics. History of mild headaches which Wellbutrin helps with, no history of migraines.     Headaches are a known side effect of scalp cooling but no headache during treatment and worsening headache today makes this less likely. The headache was acute last night and N/V followed today with worsening headache. I suspect the headache is leading to the GI upset. Headaches are reported with Aloxi. For today she will received 1L of NS, Toradol, and Compazine in clinic to control symptoms.  Upon reassessment patient had improved. The cause is unknown at this time. If headache persists beyond 3 days then likely not caused by Aloxi. She has used scopolamine in the past with success so script was sent - patient made aware of anticholinergenic side effects. She will restart her Wellbutrin tomorrow. We need to consider editing her antinausea plan for future cycles. She will do around the clock antiemetics and IBU and continue to monitor. She will attempt PO hydration and knows to call/message us if symptoms return.     PLAN     # Headache  - Toradol 30 mg IVP in clinic today   - IBU every 6 hours around the clock  - Resume Wellbutrin tomorrow     # Nausea and vomiting   - 1L NS today over 2 hours  - Compazine 10 mg IVP in clinic today  - Continue PO hydration at home   - Scopolamine patch every 3 days for nausea control - sent to [facility]  - Compazine 10 mg PO every 6 hours around the clock- use to premedicate before IBU  - Zofran 8 mg PO every 8 hours as needed starting 2/19    Follow up as scheduled, call/message if symptoms persist.     Patient verbalizes understanding of above plan. Time provided for patient's questions. All questions answered to patient's satisfaction in office. Patient instructed to reach out for any new concerning issues at [phone].    Shara Paulson MSN, APRN, A-GNP-C, AOCNP  [facility]  Division of Hematology & Medical Oncology   [address] [place] [address]  Phone: [phone]  Available via [employer] Secure Chat  [email or web address]

## 2025-02-18 NOTE — PROGRESS NOTES
I called and spoke with Ligia. She did let me know that this is the worst headache she has ever experienced. She did throw up this morning due to the headache. She denies vision changes, and dizziness. She does not feel like she is going to faint or pass out. She is staying hydrated and added an electrolyte boost in her water. She does have a little light sensitivity. Ligia did take a dose of tylenol and her Wellbutrin this morning. I did let her know that she can try Excedrin to see if that helps her symptoms. I encouraged Ligia to call or message me if symptoms do not improve or if they worsen. She is also aware that I am going to talk with Dr. Garcia and update her. Ligia knows that if there are further recommendations I will call her back. She was appreciative and will keep me posted.

## 2025-02-19 NOTE — PROGRESS NOTES
Patient ID: Ligia Franklin is a 29 y.o. female.  Diagnosis:  Right breast IDC  [facility]: Dr. Garcia  Primary Care Provider: JENNIFER Ponce  Referring Provider: No referring provider defined for this encounter.  Visit Type: Follow Up    Date of Service: 02/25/25  Location: [facility]     Patient Care Team:  JENNIFER Ponce as PCP - General (Internal Medicine)  JENNIFER Ponce as PCP - [facility] PCP  Tabitha Trevino MD as Obstetrician (Obstetrics and Gynecology)  Marion Garcia MD as Consulting Physician (Hematology and Oncology)  JEWEL Gleason as  (Oncology)  Jennifer Inman RN as Registered Nurse (Hematology and Oncology)    Current Therapy: TC     ONCOLOGIC HISTORY     Malignant neoplasm of female breast, Clinical: Stage IB (cT1, cN0, cM0, G3, ER+, DE-, HER2-)    Right breast IDC, stage 1 [id number], ER+95/DE+35, [id number] +BRCA2  - initially diagnosed after new brown nipple discharge during shower shortly after her son was born and was still breastfeeding  - Dx 2/9/2023 with 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 It was ER+95%, DE+35%  - Given her young age of onset and unknown biological history as she was adopted, she was genetically tested and tested positive for the BRCA2 mutation  -  3/31/2023 Dr. Addis العلي performed bilateeral skin sparing mastectomy and right sentinel lymph node biopsy (0/3), DCIS spanning 5cm on path.Focal 1mm anterior margin. Left skin sparing mastectomy showed benign findings. Dr. Jim Scruggs plastics did immediate reconstruction with bilateral prepectoral direct saline implants  - Reviewed at tumor board and no re-excision recommended and no RT  - Given DCIS findings alone and s/p bilateral mastectomy tumor there was not indication for endocrine therapy  - She also met with gyn/onc Dr. Tabitha Trevino and ppx oopherectomy was recommended when she was done with child bearing.  - follow up 10/21/24  for annual follow with plastics Dr Scruggs and reported discomfort with implants moving around and felt they were too large expressing interest in revision reconstruction  -  11/12/24 she underwent bilateral revision reconstruction with implant exchange, and fat grafting. Unfortunately on pathology she was found to have IDC G3, measuring 5mm in greatest dimension involving fibrous and fibroadipose tissue with DCIS and margins could not be assessed  - reexcision on 12/20/24 and noted to have two foci of IDC with DCIS with a positive new inferior margin. Final tumor size 12 mm overall grade 2. pT1cNx  - Oncotype completed and found to have RS 31  - 1/22/25 - revision of right breast reconstruction, right breast capsulotomy, right breast margin re-excision and partial mastectomy   - Given oncotype score 31 and her young age adjuvant chemotherapy was recommended  - We discussed Taxotere + Cytoxan given node negative and also discussed AC+T however due to her schedule and concern for longer term toxicity, they are very reluctant and reviewed there is not strong data that anthracycline based therapy is needed at this time  - 2/3/25 - seen by Spring Ferrera for fertility preservation, elected to not proceed   - 2/17/25 - Cycle 1 Day 1 TC with scalp cooling     Oncology History   Malignant neoplasm of female breast   6/5/2024 Initial Diagnosis    Malignant neoplasm of female breast     12/10/2024 Cancer Staged    Staging form: Breast, AJCC 8th Edition, Clinical: Stage IB (cT1, cN0, cM0, G3, ER+, SC-, HER2-) - Signed by Addis العلي,  on 12/10/2024     2/17/2025 -  Chemotherapy    TC (DOCEtaxel / CycloPHOSphamide), 21 Day Cycles        Other Contributing History    Subjective      INTERVAL HISTORY     Ligia Franklin is a 29 y.o. female who presents today for follow up of breast cancer. Patient of Dr. Garcia currently on TC and Lupron. I saw her last week for headaches. They continued by are better. Described it as skull  crushing. Tolerable with IBU. She no longer has nausea. She does not have scopolamine patch on and has not needed antiemetics in a couple days. She is using fiber and hydration for constipation and is normal now.  She is experiencing hot flashes and mouth sores. Also acne. No signs of infection. All side effects tolerable at this time.    Review of Systems   Constitutional:  Negative for appetite change, fatigue, fever and unexpected weight change.   HENT:   Positive for mouth sores.    Eyes: Negative.    Respiratory: Negative.     Cardiovascular: Negative.    Gastrointestinal:  Negative for constipation, diarrhea, nausea and vomiting.   Endocrine: Positive for hot flashes.   Genitourinary: Negative.     Musculoskeletal: Negative.    Skin: Negative.         acne   Neurological:  Positive for headaches.   Hematological: Negative.    Psychiatric/Behavioral: Negative.       Objective      /85   Pulse 97   Temp 36.7 °C (98.1 °F)   Resp 15   Wt 67.6 kg (149 lb 0.5 oz)   SpO2 98%   BMI 25.58 kg/m²   BSA: 1.75 meters squared    Wt Readings from Last 5 Encounters:   02/25/25 67.6 kg (149 lb 0.5 oz)   02/18/25 68.8 kg (151 lb 10.8 oz)   02/17/25 70 kg (154 lb 3.4 oz)   02/11/25 70 kg (154 lb 5.2 oz)   02/10/25 65.3 kg (144 lb)     Performance Status:  ECOG Score: 0- Fully active, able to carry on all pre-disease performance w/o restriction.  Karnofsky Score: 90 - Able to carry on normal activity; minor signs or symptoms of disease     PHYSICAL EXAM   Physical Exam  Constitutional:       General: She is not in acute distress.     Appearance: Normal appearance. She is not toxic-appearing.   HENT:      Head: Normocephalic and atraumatic.      Mouth/Throat:      Lips: Lesions present.      Mouth: Oral lesions present.      Tongue: Lesions present.   Eyes:      Pupils: Pupils are equal, round, and reactive to light.   Pulmonary:      Effort: Pulmonary effort is normal.   Musculoskeletal:         General: Normal range  of motion.      Cervical back: Normal range of motion.   Skin:     Comments: Facial and chest acne   Neurological:      General: No focal deficit present.      Mental Status: She is alert and oriented to person, place, and time.      Motor: No weakness.   Psychiatric:         Mood and Affect: Mood normal.         Behavior: Behavior normal.         Thought Content: Thought content normal.         Judgment: Judgment normal.         Allergies  No Known Allergies   Medications  Current Outpatient Medications   Medication Instructions    ascorbic acid (VITAMIN C) 500 mg, Daily    buPROPion XL (WELLBUTRIN XL) 300 mg, oral, Every morning, Do not crush, chew, or split.    copper (ParaGard T 380A) 380 square mm IUD by intrauterine route. PER DIRECTED    dexAMETHasone (Decadron) 4 mg tablet Take 2 tablets in the AM and PM for 3 days starting the day after chemo    dexAMETHasone 1 mg, oral, 4 times daily PRN, Take 10 mL (1 mg) by mouth 4 times a day. Swish 10 mL of solution for two minutes and spit, four times daily. Do not to eat for one hour after using the mouthwash.    docusate sodium (COLACE) 100 mg, oral, 2 times daily PRN    fluconazole (DIFLUCAN) 150 mg, oral, Every 3 days    ibuprofen 800 mg, oral, Every 6 hours PRN    ondansetron (ZOFRAN) 8 mg, oral, Every 8 hours PRN    ondansetron ODT (ZOFRAN-ODT) 4 mg, oral, Every 8 hours PRN    prochlorperazine (COMPAZINE) 10 mg, oral, Every 6 hours PRN    scopolamine (Transderm-Scop) 1 mg over 3 days patch 3 day 1 patch, transdermal, Every 72 hours    zinc gluconate 50 mg tablet Daily        Diagnostic Results   RESULTS   No results found for this or any previous visit (from the past 96 hours).    Recent Imaging     Assessment/Plan   ASSESSMENT     Ligia Franklin is a 29 y.o. female with right breast IDC ER/UT +, HER2 negative, BRCA 2 pT1cNx s/p surgery on 1/22/2025 and adjuvant TC who presents in follow up for toxicity check.     She was seen last week for headache and nausea  and vomiting. Symptoms are now controlled. IBU is helping headaches. She is asking for a prescription so she does not need to take so many pills. She has mouth sores. I will send dexamethasone elixir. I reviewed avoiding citrus and acidic foods and drinks, stick with soft warm foods, nothing spicy or hot in temperature. She was recommended L-Lysine by a family member - this is okay to try. She is having hot flashes, mostly at night, we discussed fans, layers, cooling blanket. She would also like to try tumeric which is okay. HF are not currently interfering with QOL.       PLAN     # Breast Cancer   - Continue TC every 21 days- C2 scheduled 3/10  - Lupron every 28 days - Due 3/10    # Mouth sores  - Dexamethasone elixir 4x a day   - Avoid citric, acidic hot foods    # Hot flashes   - Fans, clothing layers, cooling blanket    # Headaches  -  mg tabs sent  - Continue IBU ATC     # Nausea   - As needed antiemetics   - Scopolamine patches     Follow up in 2 weeks with Dr. Garcia and C2 and Lupgisselle .     Ligia was seen today for follow-up.  Diagnoses and all orders for this visit:  Malignant neoplasm of lower-outer quadrant of right breast of female, estrogen receptor positive (Primary)  -     ibuprofen 800 mg tablet; Take 1 tablet (800 mg) by mouth every 6 hours if needed for headaches.  Acute nonintractable headache, unspecified headache type  -     ibuprofen 800 mg tablet; Take 1 tablet (800 mg) by mouth every 6 hours if needed for headaches.  Mucositis  -     dexAMETHasone 0.5 mg/5 mL oral liquid; Take 10 mL (1 mg) by mouth 4 times a day as needed (for mouth sores). Take 10 mL (1 mg) by mouth 4 times a day. Swish 10 mL of solution for two minutes and spit, four times daily. Do not to eat for one hour after using the mouthwash.  Other orders  -     leuprolide (1-month) (Lupron Depot) injection 3.75 mg    TC (DOCEtaxel / CycloPHOSphamide), 21 Day Cycles  Venous Access Orders  Leuprolide Acetate, Every 28  Days    Patient verbalizes understanding of above plan. Time provided for patient's questions. All questions answered to patient's satisfaction in office. Patient instructed to reach out for any new concerning issues at [phone].    Shara Paulson MSN, APRN, A-GNP-C, AOCNP  [facility]  Division of Hematology & Medical Oncology   [place] [address]  Phone: [phone]  Available via [employer] Chat  [email or web address]

## 2025-02-22 ENCOUNTER — TELEPHONE (OUTPATIENT)
Dept: HEMATOLOGY/ONCOLOGY | Facility: CLINIC | Age: 30
End: 2025-02-22
Payer: COMMERCIAL

## 2025-02-24 ENCOUNTER — TELEPHONE (OUTPATIENT)
Dept: HEMATOLOGY/ONCOLOGY | Facility: CLINIC | Age: 30
End: 2025-02-24
Payer: COMMERCIAL

## 2025-02-24 NOTE — TELEPHONE ENCOUNTER
"Received patient advice request    \"Hi! I’m still struggling with some headaches. Any in-site on how to help?\"    Call to patient regarding her advice request question. Patient states that her headache got bad enough that she called into on call service.  She spoke with Dr. Garcia who advised her to take ibuprofen.  Patient states she has been taking ibuprofen and today she feels better.  She is aware that she has a follow up visit tomorrow with Shara at 10am. She has no further questions or concerns.   "

## 2025-02-25 ENCOUNTER — INFUSION (OUTPATIENT)
Dept: HEMATOLOGY/ONCOLOGY | Facility: CLINIC | Age: 30
End: 2025-02-25
Payer: COMMERCIAL

## 2025-02-25 ENCOUNTER — OFFICE VISIT (OUTPATIENT)
Dept: HEMATOLOGY/ONCOLOGY | Facility: CLINIC | Age: 30
End: 2025-02-25
Payer: COMMERCIAL

## 2025-02-25 VITALS
DIASTOLIC BLOOD PRESSURE: 85 MMHG | WEIGHT: 149.03 LBS | TEMPERATURE: 98.1 F | HEART RATE: 97 BPM | BODY MASS INDEX: 25.58 KG/M2 | OXYGEN SATURATION: 98 % | RESPIRATION RATE: 15 BRPM | SYSTOLIC BLOOD PRESSURE: 122 MMHG

## 2025-02-25 DIAGNOSIS — Z17.0 MALIGNANT NEOPLASM OF LOWER-OUTER QUADRANT OF RIGHT BREAST OF FEMALE, ESTROGEN RECEPTOR POSITIVE: Primary | ICD-10-CM

## 2025-02-25 DIAGNOSIS — C50.511 MALIGNANT NEOPLASM OF LOWER-OUTER QUADRANT OF RIGHT BREAST OF FEMALE, ESTROGEN RECEPTOR POSITIVE: Primary | ICD-10-CM

## 2025-02-25 DIAGNOSIS — Z17.0 MALIGNANT NEOPLASM OF LOWER-OUTER QUADRANT OF RIGHT BREAST OF FEMALE, ESTROGEN RECEPTOR POSITIVE: ICD-10-CM

## 2025-02-25 DIAGNOSIS — R51.9 ACUTE NONINTRACTABLE HEADACHE, UNSPECIFIED HEADACHE TYPE: ICD-10-CM

## 2025-02-25 DIAGNOSIS — C50.511 MALIGNANT NEOPLASM OF LOWER-OUTER QUADRANT OF RIGHT BREAST OF FEMALE, ESTROGEN RECEPTOR POSITIVE: ICD-10-CM

## 2025-02-25 DIAGNOSIS — K12.30 MUCOSITIS: ICD-10-CM

## 2025-02-25 PROCEDURE — 99213 OFFICE O/P EST LOW 20 MIN: CPT

## 2025-02-25 RX ORDER — IBUPROFEN 800 MG/1
800 TABLET ORAL EVERY 6 HOURS PRN
Qty: 120 TABLET | Refills: 2 | Status: SHIPPED | OUTPATIENT
Start: 2025-02-25

## 2025-02-25 RX ORDER — DEXAMETHASONE 0.5 MG/5ML
1 ELIXIR ORAL 4 TIMES DAILY PRN
Qty: 237 ML | Refills: 1 | Status: SHIPPED | OUTPATIENT
Start: 2025-02-25

## 2025-02-25 ASSESSMENT — ENCOUNTER SYMPTOMS
VOMITING: 0
EYES NEGATIVE: 1
CONSTIPATION: 0
NAUSEA: 0
DIARRHEA: 0
UNEXPECTED WEIGHT CHANGE: 0
HOT FLASHES: 1
MUSCULOSKELETAL NEGATIVE: 1
FEVER: 0
RESPIRATORY NEGATIVE: 1
HEMATOLOGIC/LYMPHATIC NEGATIVE: 1
APPETITE CHANGE: 0
CARDIOVASCULAR NEGATIVE: 1
PSYCHIATRIC NEGATIVE: 1
ROS SKIN COMMENTS: ACNE
FATIGUE: 0
HEADACHES: 1

## 2025-02-25 ASSESSMENT — PAIN SCALES - GENERAL: PAINLEVEL_OUTOF10: 0-NO PAIN

## 2025-03-04 ENCOUNTER — APPOINTMENT (OUTPATIENT)
Dept: HEMATOLOGY/ONCOLOGY | Facility: HOSPITAL | Age: 30
End: 2025-03-04
Payer: COMMERCIAL

## 2025-03-09 NOTE — PROGRESS NOTES
Patient ID: Ligia Franklin is a 29 y.o. female.  Cancer Staging   Malignant neoplasm of female breast  Staging form: Breast, AJCC 8th Edition  - Clinical: Stage IB (cT1, cN0, cM0, G3, ER+, ME-, HER2-) - Signed by Addis العلي DO on 12/10/2024    Oncology History   Malignant neoplasm of female breast   6/5/2024 Initial Diagnosis    Malignant neoplasm of female breast     12/10/2024 Cancer Staged    Staging form: Breast, AJCC 8th Edition, Clinical: Stage IB (cT1, cN0, cM0, G3, ER+, ME-, HER2-) - Signed by Addis العلي DO on 12/10/2024     2/17/2025 -  Chemotherapy    TC (DOCEtaxel / CycloPHOSphamide), 21 Day Cycles       Subjective    HPI  Ms. Strong is a 30 y/o F presenting for follow-up for new diagnosis of breast cancer. Here for consideration of next cycle.     Patient reports nausea and headaches. She experienced significant bone pain with allergy medication.     Patient's past medical history, surgical history, family history and social history reviewed.    Review of Systems:   Review of Systems:    Positive per HPI, otherwise negative.     Objective    /83   Pulse 95   Temp 36.9 °C (98.4 °F)   Resp 18   Wt 70.9 kg (156 lb 4.9 oz)   SpO2 97%   BMI 26.83 kg/m²      Physical Exam  Gen: appears well in clinic, NAD  HEENT: atraumatic head, normocephalic, EOMI, conjunctiva normal  LUNG: no increased WOB, CTAB  CV: No JVD. 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 It was ER+95%, ME+35%  - Given her young age  of onset and unknown biological history as she was adopted, she was genetically tested and tested positive for the BRCA2 mutation  -  3/31/2023 Dr. Addis العلي performed bilateeral skin sparing mastectomy and right sentinel lymph node biopsy (0/3), DCIS spanning 5cm on path.Focal 1mm anterior margin. Left skin sparing mastectomy showed benign findings. Dr. Jim Scruggs plastics did immediate reconstruction with bilateral prepectoral direct saline implants  - Reviewed at tumor board and no re-excision recommended and no RT  - Given DCIS findings alone and s/p bilateral mastectomy tumor there was not indication for endocrine therapy  - She also met with gyn/onc Dr. Tabitha Trevino and ppx oopherectomy was recommended when she was done with child bearing.  - follow up 10/21/24 for annual follow with plastics Dr Scruggs and reported discomfort with implants moving around and felt they were too large expressing interest in revision reconstruction  -  11/12/24 she underwent bilateral revision reconstruction with implant exchange, and fat grafting. Unfortunately on pathology she was found to have IDC G3, measuring 5mm in greatest dimension involving fibrous and fibroadipose tissue with DCIS and margins could not be assessed  - reexcision on 12/20/24 and noted to have two foci of IDC with DCIS with a positive new inferior margin. Final tumor size 12 mm overall grade 2. pT1cNx  -Oncotype completed and found to have RS 31  - planned for reexcision on 1/22/24  - Today we discussed given oncotype score 31 and her young age I would recommend adjuvant chemotherapy.  - We discussed Taxotere + Cytoxan given node negative and also discussed AC+T however due to her schedule and concern for longer term toxicity, they are very reluctant and reviewed there is not strong data that anthracycline based therapy is needed at this time  - Reviewed side effects and consent signed today.   - She is planned for revision surgery 1/22/25  -  discussed importance of fertility preservation, [facility] Ferrera contacted  - Will plan to start treatment on 2/17/25 if fertility preservation treatment has been completed  - fitted for cooling cap today  - RTC one week post treatment for toxicity check with Matthew and with me for C2.  - Referral sent to on[facility] fertility placed today.     3/10/25:   - CMP pending, CBC at goal   - No contraindications to proceed with full dose today   - Given her nausea and headaches, we recommended avoiding dexamethasone unless necessary. Zyprexa will be added at bedtime.   - We did discuss continuing Claritin daily.   - Will plan for toxicity check  this Friday and possible fluids  - Given the headache we will hold the aloxi to see if that helps and will plan for Zofran instead.   - RTC in 3 weeks     RTC in 3 weeks. This note has been transcribed using a medical scribe and there is a possibility of unintentional typing misprints.       Diagnoses and all orders for this visit:  Malignant neoplasm of nipple of right breast in female, estrogen receptor positive  -     Clinic Appointment Request  -     Infusion Appointment Request; Future  -     Infusion Appointment Request; Future  -     Clinic Appointment Request; Future  -     Infusion Appointment Request; Future  -     CBC and Auto Differential; Future  -     Comprehensive metabolic panel; Future  -     Clinic Appointment Request; Future  -     Infusion Appointment Request; Future  -     CBC and Auto Differential; Future  -     Comprehensive metabolic panel; Future  Chemotherapy induced nausea and vomiting  -     scopolamine (Transderm-Scop) 1 mg over 3 days patch 3 day; Place 1 patch over 72 hours on the skin every 3rd day. (Patient not taking: Reported on 3/13/2025)  -     OLANZapine (ZyPREXA) 2.5 mg tablet; Take 1 tablet (2.5 mg) by mouth once daily at bedtime. Start night of chemo  Other orders  -     pegfilgrastim (Neulasta Onpro) injection 6 mg  -     dexAMETHasone (Decadron)  tablet 12 mg  -     diphenhydrAMINE (BENADryl) capsule 25 mg  -     prochlorperazine (Compazine) tablet 10 mg  -     prochlorperazine (Compazine) injection 10 mg  -     albumin-bound PACLitaxel 445 mg IV (Abraxane) 103 mL  -     cycloPHOSphamide (Cytoxan) 1,000 mg in sodium chloride 0.9% 150 mL IV  -     sodium chloride 0.9 % bolus 500 mL  -     dextrose 5 % in water (D5W) bolus 500 mL  -     diphenhydrAMINE (BENADryl) injection 50 mg  -     methylPREDNISolone sod succinate (SOLU-Medrol) 40 mg/mL injection 40 mg  -     famotidine PF (Pepcid) injection 20 mg  -     EPINEPHrine (Epipen) injection syringe 0.3 mg  -     albuterol 2.5 mg /3 mL (0.083 %) nebulizer solution 3 mL  -     pegfilgrastim (Neulasta Onpro) injection 6 mg  -     dexAMETHasone (Decadron) tablet 12 mg  -     diphenhydrAMINE (BENADryl) capsule 25 mg  -     prochlorperazine (Compazine) tablet 10 mg  -     prochlorperazine (Compazine) injection 10 mg  -     albumin-bound PACLitaxel 445 mg IV (Abraxane) 103 mL  -     cycloPHOSphamide (Cytoxan) 1,000 mg in sodium chloride 0.9% 150 mL IV  -     sodium chloride 0.9 % bolus 500 mL  -     dextrose 5 % in water (D5W) bolus 500 mL  -     diphenhydrAMINE (BENADryl) injection 50 mg  -     methylPREDNISolone sod succinate (SOLU-Medrol) 40 mg/mL injection 40 mg  -     famotidine PF (Pepcid) injection 20 mg  -     EPINEPHrine (Epipen) injection syringe 0.3 mg  -     albuterol 2.5 mg /3 mL (0.083 %) nebulizer solution 3 mL    Marion Garcia MD  Hematology/Oncology  [facility]      Scribe Attestation  By signing my name below, IPamela Scribe attest that this documentation has been prepared under the direction and in the presence of Marion Garcia MD.  Time Spent  Prep time on day of patient encounter: 5 minutes  Time spent directly with patient, family or caregiver: 28 minutes  Additional Time Spent on Patient Care Activities: 7 minutes  Documentation Time: 6 minutes  Other Time  Spent: 0 minutes  Total: 46 minutes        Addendum 3/13/25  Patient had an infusion reaction within first 15 minutes of 1/4 rate bumps of Taxotere despite additional premedications. Will plan to switch Docetaxel to Abraxane based on data in HER2 positive disease.  There is not significant data with Abraxane plus Cytoxan in hormone positive disease however was studied in HER2 positive disease where dose of Abraxane 260 mg/m² was utilized every 3 weeks (Cesar, et al. [employer] 2021) and shown to be tolerable. Abraxane is NCCN recommended treatment in HR+ HER2 negative breast cancer in metastatic setting and was reported in ASCO 2019, to be used safely in high risk breast cancer after anthracycline and 5-year EFS was not statistically significantly different between neoadjuvant nab-paclitaxel compared to paclitaxel (Len BAILEY, et al.   J Clin Oncol 37:515-515, 2019). Abraxane reviewed and consented today

## 2025-03-10 ENCOUNTER — LAB (OUTPATIENT)
Dept: LAB | Facility: CLINIC | Age: 30
End: 2025-03-10
Payer: COMMERCIAL

## 2025-03-10 ENCOUNTER — OFFICE VISIT (OUTPATIENT)
Dept: HEMATOLOGY/ONCOLOGY | Facility: CLINIC | Age: 30
End: 2025-03-10
Payer: COMMERCIAL

## 2025-03-10 ENCOUNTER — NUTRITION (OUTPATIENT)
Dept: HEMATOLOGY/ONCOLOGY | Facility: CLINIC | Age: 30
End: 2025-03-10

## 2025-03-10 ENCOUNTER — INFUSION (OUTPATIENT)
Dept: HEMATOLOGY/ONCOLOGY | Facility: CLINIC | Age: 30
End: 2025-03-10
Payer: COMMERCIAL

## 2025-03-10 VITALS
OXYGEN SATURATION: 97 % | WEIGHT: 156.31 LBS | SYSTOLIC BLOOD PRESSURE: 137 MMHG | HEART RATE: 95 BPM | RESPIRATION RATE: 18 BRPM | DIASTOLIC BLOOD PRESSURE: 83 MMHG | TEMPERATURE: 98.4 F | BODY MASS INDEX: 26.83 KG/M2

## 2025-03-10 VITALS — WEIGHT: 156.31 LBS | BODY MASS INDEX: 26.04 KG/M2 | HEIGHT: 65 IN

## 2025-03-10 DIAGNOSIS — R11.2 CHEMOTHERAPY INDUCED NAUSEA AND VOMITING: ICD-10-CM

## 2025-03-10 DIAGNOSIS — Z17.0 MALIGNANT NEOPLASM OF NIPPLE OF RIGHT BREAST IN FEMALE, ESTROGEN RECEPTOR POSITIVE: Primary | ICD-10-CM

## 2025-03-10 DIAGNOSIS — T45.1X5A CHEMOTHERAPY INDUCED NAUSEA AND VOMITING: ICD-10-CM

## 2025-03-10 DIAGNOSIS — D05.11 DUCTAL CARCINOMA IN SITU (DCIS) OF RIGHT BREAST: ICD-10-CM

## 2025-03-10 DIAGNOSIS — C50.011 MALIGNANT NEOPLASM OF NIPPLE OF RIGHT BREAST IN FEMALE, ESTROGEN RECEPTOR POSITIVE: ICD-10-CM

## 2025-03-10 DIAGNOSIS — Z17.0 MALIGNANT NEOPLASM OF NIPPLE OF RIGHT BREAST IN FEMALE, ESTROGEN RECEPTOR POSITIVE: ICD-10-CM

## 2025-03-10 DIAGNOSIS — C50.011 MALIGNANT NEOPLASM OF NIPPLE OF RIGHT BREAST IN FEMALE, ESTROGEN RECEPTOR POSITIVE: Primary | ICD-10-CM

## 2025-03-10 LAB
ALBUMIN SERPL BCP-MCNC: 4.3 G/DL (ref 3.4–5)
ALP SERPL-CCNC: 75 U/L (ref 33–110)
ALT SERPL W P-5'-P-CCNC: 21 U/L (ref 7–45)
ANION GAP SERPL CALC-SCNC: 12 MMOL/L (ref 10–20)
AST SERPL W P-5'-P-CCNC: 13 U/L (ref 9–39)
BASOPHILS # BLD AUTO: 0.06 X10*3/UL (ref 0–0.1)
BASOPHILS NFR BLD AUTO: 1.1 %
BILIRUB SERPL-MCNC: 0.2 MG/DL (ref 0–1.2)
BUN SERPL-MCNC: 18 MG/DL (ref 6–23)
CALCIUM SERPL-MCNC: 9.1 MG/DL (ref 8.6–10.3)
CHLORIDE SERPL-SCNC: 105 MMOL/L (ref 98–107)
CO2 SERPL-SCNC: 27 MMOL/L (ref 21–32)
CREAT SERPL-MCNC: 0.79 MG/DL (ref 0.5–1.05)
EGFRCR SERPLBLD CKD-EPI 2021: >90 ML/MIN/1.73M*2
EOSINOPHIL # BLD AUTO: 0.19 X10*3/UL (ref 0–0.7)
EOSINOPHIL NFR BLD AUTO: 3.3 %
ERYTHROCYTE [DISTWIDTH] IN BLOOD BY AUTOMATED COUNT: 13.3 % (ref 11.5–14.5)
GLUCOSE SERPL-MCNC: 117 MG/DL (ref 74–99)
HCT VFR BLD AUTO: 35.6 % (ref 36–46)
HGB BLD-MCNC: 11.8 G/DL (ref 12–16)
IMM GRANULOCYTES # BLD AUTO: 0.02 X10*3/UL (ref 0–0.7)
IMM GRANULOCYTES NFR BLD AUTO: 0.4 % (ref 0–0.9)
LYMPHOCYTES # BLD AUTO: 1.89 X10*3/UL (ref 1.2–4.8)
LYMPHOCYTES NFR BLD AUTO: 33.1 %
MCH RBC QN AUTO: 30.9 PG (ref 26–34)
MCHC RBC AUTO-ENTMCNC: 33.1 G/DL (ref 32–36)
MCV RBC AUTO: 93 FL (ref 80–100)
MONOCYTES # BLD AUTO: 0.45 X10*3/UL (ref 0.1–1)
MONOCYTES NFR BLD AUTO: 7.9 %
NEUTROPHILS # BLD AUTO: 3.1 X10*3/UL (ref 1.2–7.7)
NEUTROPHILS NFR BLD AUTO: 54.2 %
PLATELET # BLD AUTO: 385 X10*3/UL (ref 150–450)
POTASSIUM SERPL-SCNC: 4.4 MMOL/L (ref 3.5–5.3)
PROT SERPL-MCNC: 6.8 G/DL (ref 6.4–8.2)
RBC # BLD AUTO: 3.82 X10*6/UL (ref 4–5.2)
SODIUM SERPL-SCNC: 140 MMOL/L (ref 136–145)
WBC # BLD AUTO: 5.7 X10*3/UL (ref 4.4–11.3)

## 2025-03-10 PROCEDURE — 36415 COLL VENOUS BLD VENIPUNCTURE: CPT

## 2025-03-10 PROCEDURE — 2500000001 HC RX 250 WO HCPCS SELF ADMINISTERED DRUGS (ALT 637 FOR MEDICARE OP): Performed by: INTERNAL MEDICINE

## 2025-03-10 PROCEDURE — G2211 COMPLEX E/M VISIT ADD ON: HCPCS | Performed by: INTERNAL MEDICINE

## 2025-03-10 PROCEDURE — 84075 ASSAY ALKALINE PHOSPHATASE: CPT

## 2025-03-10 PROCEDURE — 2500000004 HC RX 250 GENERAL PHARMACY W/ HCPCS (ALT 636 FOR OP/ED)

## 2025-03-10 PROCEDURE — 2500000004 HC RX 250 GENERAL PHARMACY W/ HCPCS (ALT 636 FOR OP/ED): Performed by: INTERNAL MEDICINE

## 2025-03-10 PROCEDURE — 96401 CHEMO ANTI-NEOPL SQ/IM: CPT

## 2025-03-10 PROCEDURE — 85025 COMPLETE CBC W/AUTO DIFF WBC: CPT

## 2025-03-10 PROCEDURE — 2500000004 HC RX 250 GENERAL PHARMACY W/ HCPCS (ALT 636 FOR OP/ED): Mod: JZ,TB

## 2025-03-10 PROCEDURE — 96375 TX/PRO/DX INJ NEW DRUG ADDON: CPT | Mod: INF

## 2025-03-10 PROCEDURE — 99215 OFFICE O/P EST HI 40 MIN: CPT | Performed by: INTERNAL MEDICINE

## 2025-03-10 PROCEDURE — 96376 TX/PRO/DX INJ SAME DRUG ADON: CPT

## 2025-03-10 PROCEDURE — 96413 CHEMO IV INFUSION 1 HR: CPT

## 2025-03-10 RX ORDER — DIPHENHYDRAMINE HYDROCHLORIDE 50 MG/ML
50 INJECTION INTRAMUSCULAR; INTRAVENOUS AS NEEDED
Status: COMPLETED | OUTPATIENT
Start: 2025-03-10 | End: 2025-03-10

## 2025-03-10 RX ORDER — PROCHLORPERAZINE MALEATE 10 MG
10 TABLET ORAL EVERY 6 HOURS PRN
Status: DISCONTINUED | OUTPATIENT
Start: 2025-03-10 | End: 2025-03-10 | Stop reason: HOSPADM

## 2025-03-10 RX ORDER — EPINEPHRINE 0.3 MG/.3ML
0.3 INJECTION SUBCUTANEOUS EVERY 5 MIN PRN
OUTPATIENT
Start: 2025-04-03

## 2025-03-10 RX ORDER — PROCHLORPERAZINE EDISYLATE 5 MG/ML
10 INJECTION INTRAMUSCULAR; INTRAVENOUS EVERY 6 HOURS PRN
OUTPATIENT
Start: 2025-04-03

## 2025-03-10 RX ORDER — OLANZAPINE 2.5 MG/1
2.5 TABLET ORAL NIGHTLY
Qty: 30 TABLET | Refills: 0 | Status: SHIPPED | OUTPATIENT
Start: 2025-03-10 | End: 2025-04-09

## 2025-03-10 RX ORDER — FAMOTIDINE 10 MG/ML
20 INJECTION, SOLUTION INTRAVENOUS ONCE AS NEEDED
Status: CANCELLED | OUTPATIENT
Start: 2025-03-13

## 2025-03-10 RX ORDER — EPINEPHRINE 0.3 MG/.3ML
0.3 INJECTION SUBCUTANEOUS EVERY 5 MIN PRN
OUTPATIENT
Start: 2025-04-07

## 2025-03-10 RX ORDER — EPINEPHRINE 0.3 MG/.3ML
0.3 INJECTION SUBCUTANEOUS EVERY 5 MIN PRN
Status: CANCELLED | OUTPATIENT
Start: 2025-03-13

## 2025-03-10 RX ORDER — FAMOTIDINE 10 MG/ML
20 INJECTION, SOLUTION INTRAVENOUS ONCE AS NEEDED
OUTPATIENT
Start: 2025-04-03

## 2025-03-10 RX ORDER — PROCHLORPERAZINE MALEATE 10 MG
10 TABLET ORAL EVERY 6 HOURS PRN
OUTPATIENT
Start: 2025-04-24

## 2025-03-10 RX ORDER — DEXAMETHASONE 6 MG/1
12 TABLET ORAL ONCE
Status: CANCELLED | OUTPATIENT
Start: 2025-03-10 | End: 2025-03-10

## 2025-03-10 RX ORDER — ALBUTEROL SULFATE 0.83 MG/ML
3 SOLUTION RESPIRATORY (INHALATION) AS NEEDED
Status: DISCONTINUED | OUTPATIENT
Start: 2025-03-10 | End: 2025-03-10 | Stop reason: HOSPADM

## 2025-03-10 RX ORDER — PROCHLORPERAZINE MALEATE 10 MG
10 TABLET ORAL EVERY 6 HOURS PRN
OUTPATIENT
Start: 2025-04-03

## 2025-03-10 RX ORDER — FAMOTIDINE 10 MG/ML
20 INJECTION, SOLUTION INTRAVENOUS ONCE AS NEEDED
Status: COMPLETED | OUTPATIENT
Start: 2025-03-10 | End: 2025-03-10

## 2025-03-10 RX ORDER — PROCHLORPERAZINE EDISYLATE 5 MG/ML
10 INJECTION INTRAMUSCULAR; INTRAVENOUS EVERY 6 HOURS PRN
Status: CANCELLED | OUTPATIENT
Start: 2025-03-13

## 2025-03-10 RX ORDER — HEPARIN 100 UNIT/ML
500 SYRINGE INTRAVENOUS AS NEEDED
Status: CANCELLED | OUTPATIENT
Start: 2025-03-10

## 2025-03-10 RX ORDER — HEPARIN SODIUM,PORCINE/PF 10 UNIT/ML
50 SYRINGE (ML) INTRAVENOUS AS NEEDED
Status: CANCELLED | OUTPATIENT
Start: 2025-03-10

## 2025-03-10 RX ORDER — ONDANSETRON HYDROCHLORIDE 4 MG/2ML
16 INJECTION, SOLUTION INTRAMUSCULAR; INTRAVENOUS ONCE
Status: CANCELLED | OUTPATIENT
Start: 2025-04-24 | End: 2025-05-12

## 2025-03-10 RX ORDER — DIPHENHYDRAMINE HCL 25 MG
25 CAPSULE ORAL ONCE
OUTPATIENT
Start: 2025-04-03

## 2025-03-10 RX ORDER — EPINEPHRINE 0.3 MG/.3ML
0.3 INJECTION SUBCUTANEOUS EVERY 5 MIN PRN
Status: CANCELLED | OUTPATIENT
Start: 2025-03-10

## 2025-03-10 RX ORDER — EPINEPHRINE 0.3 MG/.3ML
0.3 INJECTION SUBCUTANEOUS EVERY 5 MIN PRN
OUTPATIENT
Start: 2025-04-24

## 2025-03-10 RX ORDER — DIPHENHYDRAMINE HCL 25 MG
25 CAPSULE ORAL ONCE
Status: COMPLETED | OUTPATIENT
Start: 2025-03-10 | End: 2025-03-10

## 2025-03-10 RX ORDER — ONDANSETRON HYDROCHLORIDE 4 MG/2ML
16 INJECTION, SOLUTION INTRAMUSCULAR; INTRAVENOUS ONCE
Status: CANCELLED | OUTPATIENT
Start: 2025-04-03 | End: 2025-04-21

## 2025-03-10 RX ORDER — PROCHLORPERAZINE MALEATE 10 MG
10 TABLET ORAL EVERY 6 HOURS PRN
Status: CANCELLED | OUTPATIENT
Start: 2025-03-13

## 2025-03-10 RX ORDER — DIPHENHYDRAMINE HCL 25 MG
25 CAPSULE ORAL ONCE
Status: CANCELLED | OUTPATIENT
Start: 2025-03-13

## 2025-03-10 RX ORDER — ONDANSETRON HYDROCHLORIDE 4 MG/2ML
16 INJECTION, SOLUTION INTRAMUSCULAR; INTRAVENOUS ONCE
Status: CANCELLED | OUTPATIENT
Start: 2025-03-10

## 2025-03-10 RX ORDER — DIPHENHYDRAMINE HYDROCHLORIDE 50 MG/ML
50 INJECTION INTRAMUSCULAR; INTRAVENOUS AS NEEDED
OUTPATIENT
Start: 2025-04-07

## 2025-03-10 RX ORDER — DIPHENHYDRAMINE HYDROCHLORIDE 50 MG/ML
50 INJECTION INTRAMUSCULAR; INTRAVENOUS AS NEEDED
Status: CANCELLED | OUTPATIENT
Start: 2025-03-13

## 2025-03-10 RX ORDER — DIPHENHYDRAMINE HCL 25 MG
25 CAPSULE ORAL ONCE
Status: CANCELLED | OUTPATIENT
Start: 2025-03-10

## 2025-03-10 RX ORDER — ALBUTEROL SULFATE 0.83 MG/ML
3 SOLUTION RESPIRATORY (INHALATION) AS NEEDED
OUTPATIENT
Start: 2025-04-03

## 2025-03-10 RX ORDER — FAMOTIDINE 10 MG/ML
20 INJECTION, SOLUTION INTRAVENOUS ONCE AS NEEDED
Status: CANCELLED | OUTPATIENT
Start: 2025-03-10

## 2025-03-10 RX ORDER — DIPHENHYDRAMINE HYDROCHLORIDE 50 MG/ML
50 INJECTION INTRAMUSCULAR; INTRAVENOUS AS NEEDED
OUTPATIENT
Start: 2025-04-03

## 2025-03-10 RX ORDER — ALBUTEROL SULFATE 0.83 MG/ML
3 SOLUTION RESPIRATORY (INHALATION) AS NEEDED
OUTPATIENT
Start: 2025-04-07

## 2025-03-10 RX ORDER — DEXAMETHASONE 6 MG/1
12 TABLET ORAL ONCE
OUTPATIENT
Start: 2025-04-24 | End: 2025-05-12

## 2025-03-10 RX ORDER — DEXAMETHASONE 6 MG/1
12 TABLET ORAL ONCE
Status: COMPLETED | OUTPATIENT
Start: 2025-03-10 | End: 2025-03-10

## 2025-03-10 RX ORDER — DIPHENHYDRAMINE HYDROCHLORIDE 50 MG/ML
50 INJECTION INTRAMUSCULAR; INTRAVENOUS AS NEEDED
OUTPATIENT
Start: 2025-04-24

## 2025-03-10 RX ORDER — DIPHENHYDRAMINE HCL 25 MG
25 CAPSULE ORAL ONCE
OUTPATIENT
Start: 2025-04-24

## 2025-03-10 RX ORDER — SCOPOLAMINE 1 MG/3D
1 PATCH, EXTENDED RELEASE TRANSDERMAL
Qty: 1 PATCH | Refills: 3 | Status: SHIPPED | OUTPATIENT
Start: 2025-03-10

## 2025-03-10 RX ORDER — FAMOTIDINE 10 MG/ML
20 INJECTION, SOLUTION INTRAVENOUS ONCE
Status: COMPLETED | OUTPATIENT
Start: 2025-03-10 | End: 2025-03-10

## 2025-03-10 RX ORDER — DIPHENHYDRAMINE HYDROCHLORIDE 50 MG/ML
50 INJECTION INTRAMUSCULAR; INTRAVENOUS AS NEEDED
Status: CANCELLED | OUTPATIENT
Start: 2025-03-10

## 2025-03-10 RX ORDER — PROCHLORPERAZINE EDISYLATE 5 MG/ML
10 INJECTION INTRAMUSCULAR; INTRAVENOUS EVERY 6 HOURS PRN
Status: DISCONTINUED | OUTPATIENT
Start: 2025-03-10 | End: 2025-03-10 | Stop reason: HOSPADM

## 2025-03-10 RX ORDER — DEXAMETHASONE 6 MG/1
12 TABLET ORAL ONCE
Status: CANCELLED | OUTPATIENT
Start: 2025-03-13 | End: 2025-03-31

## 2025-03-10 RX ORDER — ALBUTEROL SULFATE 0.83 MG/ML
3 SOLUTION RESPIRATORY (INHALATION) AS NEEDED
Status: CANCELLED | OUTPATIENT
Start: 2025-03-13

## 2025-03-10 RX ORDER — EPINEPHRINE 0.3 MG/.3ML
0.3 INJECTION SUBCUTANEOUS EVERY 5 MIN PRN
Status: DISCONTINUED | OUTPATIENT
Start: 2025-03-10 | End: 2025-03-10 | Stop reason: HOSPADM

## 2025-03-10 RX ORDER — PROCHLORPERAZINE EDISYLATE 5 MG/ML
10 INJECTION INTRAMUSCULAR; INTRAVENOUS EVERY 6 HOURS PRN
OUTPATIENT
Start: 2025-04-24

## 2025-03-10 RX ORDER — ONDANSETRON HYDROCHLORIDE 8 MG/1
16 TABLET, FILM COATED ORAL ONCE
Status: COMPLETED | OUTPATIENT
Start: 2025-03-10 | End: 2025-03-10

## 2025-03-10 RX ORDER — DEXAMETHASONE 6 MG/1
12 TABLET ORAL ONCE
OUTPATIENT
Start: 2025-04-03 | End: 2025-04-21

## 2025-03-10 RX ORDER — PROCHLORPERAZINE MALEATE 10 MG
10 TABLET ORAL EVERY 6 HOURS PRN
Status: CANCELLED | OUTPATIENT
Start: 2025-03-10

## 2025-03-10 RX ORDER — ALBUTEROL SULFATE 0.83 MG/ML
3 SOLUTION RESPIRATORY (INHALATION) AS NEEDED
Status: CANCELLED | OUTPATIENT
Start: 2025-03-10

## 2025-03-10 RX ORDER — ALBUTEROL SULFATE 0.83 MG/ML
3 SOLUTION RESPIRATORY (INHALATION) AS NEEDED
OUTPATIENT
Start: 2025-04-24

## 2025-03-10 RX ORDER — FAMOTIDINE 10 MG/ML
20 INJECTION, SOLUTION INTRAVENOUS ONCE AS NEEDED
OUTPATIENT
Start: 2025-04-07

## 2025-03-10 RX ORDER — ONDANSETRON HYDROCHLORIDE 4 MG/2ML
16 INJECTION, SOLUTION INTRAMUSCULAR; INTRAVENOUS ONCE
Status: DISCONTINUED | OUTPATIENT
Start: 2025-03-10 | End: 2025-03-10 | Stop reason: SDUPTHER

## 2025-03-10 RX ORDER — FAMOTIDINE 10 MG/ML
20 INJECTION, SOLUTION INTRAVENOUS ONCE AS NEEDED
OUTPATIENT
Start: 2025-04-24

## 2025-03-10 RX ORDER — ONDANSETRON HYDROCHLORIDE 4 MG/2ML
16 INJECTION, SOLUTION INTRAMUSCULAR; INTRAVENOUS ONCE
Status: CANCELLED | OUTPATIENT
Start: 2025-03-13

## 2025-03-10 RX ORDER — PROCHLORPERAZINE EDISYLATE 5 MG/ML
10 INJECTION INTRAMUSCULAR; INTRAVENOUS EVERY 6 HOURS PRN
Status: CANCELLED | OUTPATIENT
Start: 2025-03-10

## 2025-03-10 RX ORDER — DIPHENHYDRAMINE HYDROCHLORIDE 50 MG/ML
25 INJECTION INTRAMUSCULAR; INTRAVENOUS ONCE
Status: COMPLETED | OUTPATIENT
Start: 2025-03-10 | End: 2025-03-10

## 2025-03-10 RX ADMIN — FAMOTIDINE 20 MG: 10 INJECTION INTRAVENOUS at 12:47

## 2025-03-10 RX ADMIN — DEXTROSE MONOHYDRATE 130 MG: 50 INJECTION, SOLUTION INTRAVENOUS at 11:46

## 2025-03-10 RX ADMIN — LEUPROLIDE ACETATE 3.75 MG: KIT at 13:38

## 2025-03-10 RX ADMIN — DIPHENHYDRAMINE HYDROCHLORIDE 25 MG: 25 CAPSULE ORAL at 11:13

## 2025-03-10 RX ADMIN — ONDANSETRON HYDROCHLORIDE 16 MG: 8 TABLET, FILM COATED ORAL at 11:13

## 2025-03-10 RX ADMIN — FAMOTIDINE 20 MG: 10 INJECTION INTRAVENOUS at 11:56

## 2025-03-10 RX ADMIN — METHYLPREDNISOLONE SODIUM SUCCINATE 40 MG: 40 INJECTION, POWDER, FOR SOLUTION INTRAMUSCULAR; INTRAVENOUS at 11:53

## 2025-03-10 RX ADMIN — DIPHENHYDRAMINE HYDROCHLORIDE 25 MG: 50 INJECTION INTRAMUSCULAR; INTRAVENOUS at 12:50

## 2025-03-10 RX ADMIN — DIPHENHYDRAMINE HYDROCHLORIDE 25 MG: 50 INJECTION INTRAMUSCULAR; INTRAVENOUS at 11:55

## 2025-03-10 RX ADMIN — METHYLPREDNISOLONE SODIUM SUCCINATE 40 MG: 40 INJECTION, POWDER, FOR SOLUTION INTRAMUSCULAR; INTRAVENOUS at 12:49

## 2025-03-10 RX ADMIN — DEXAMETHASONE 12 MG: 6 TABLET ORAL at 11:13

## 2025-03-10 ASSESSMENT — PAIN SCALES - GENERAL: PAINLEVEL_OUTOF10: 0-NO PAIN

## 2025-03-10 NOTE — PROGRESS NOTES
Patient here for C2D1 of Docetaxel & Cytoxan. Patient had a hypersensitivity reaction within the first 5 minutes of receiving the 2nd dose of Docetaxel. Per Dr. Garcia she would like to continue with re-challenging the treatment at quarter rates. The following rates were independently calculated and verified by 2 nurses:    Rate: 68.5 mL/hr  -  Volume: 17.1 mL for 15 minutes    Rate: 137 mL/hr  -  Volume: 34.2 mL for 15 minutes    Rate: 205 mL/hr  -  Volume: 51.3 mL for 15 minutes    Rate: 274 mL/hr  -  Volume: 171.41 mL for remainder of infusion

## 2025-03-10 NOTE — PROGRESS NOTES
"Adverse Event Note     Name:Ligia Franklin  : 1995  MRN: [id number]      Adverse Drug Reaction  Date and Time of Reaction: 3/10/2025 11:51 EDT    Medication Administration Details  Medication Administered: DOCETAXEL  Date and Time Medication Administration: 3/10/2025 11:46 EDT  Patient Location at Time of Admin: [facility] INFUSION    ADR Symptoms:  Symptoms: Chest pain and Erythema face  Severity: Moderate    Actions Taken:  Actions Taken: Provider notified, Pharmacy notified, Family/parent notified, Rapid Response called (refer to code documentation), Medications given (refer to MAR), IV fluids given (refer to MAR) and Vital signs taken (refer to flowsheet)  Provider Notified:  Medications Given: Famotidine (Pepcid), Diphenhydramine (Benadryl) and Methylprednisolone (Solu-Medrol)  Outcome: Treatment modified, Treatment discontinued and Patient discharged to home/facility    Additional Details:  Patient here for C2D1 of Docetaxel and Cytoxan. Patient given the following premeds: Zofran 16mg PO, Decadron 12mg PO, & Benadryl 25mg PO. Patient's Docetaxel was initiated at 1146. RN remained in the room to observe for a hypersensitivity reaction.  1151: Patient began to report \"yep, yep something is happening.\" RN immediately stopped infusion, initiated a Rapid Response, and disconnected patient from chemo.   1152: Patient reporting overall not feeling well, mild to moderate facial flushing, a warmth coming over her like she's going to pass out, some dizziness, slight nausea, and a feeling of a \"pounding chest\". Rapid Response Team (RRT) present in the room and Cheli ZAVALA NP present. NS 1L IV initiated wide open.  1153: Solumedrol 40mg IV push administered  1154: vital signs - BP: 144/85, HR: 119, temp: 36.2, pulse ox: 95% RA  1155: Benadryl 25mg IV push administered  1156: Pepcid 20mg IV push administered  1157: Patient reporting that she is feeling better. Apparent that flushing and many symptoms were resolving, " 12/14/19 0930   Group 1   Start Time 0930   Stop Time 1000   Length (min) 30 Min   Group Name Check-In   Focus of Group Goals and Sx rating   Attendance Present   Mood Anxious   Affect Tense; Other  (Anxious)   Behavior/Socialization Cooperative;Engaged; Outspoken   Thought Process Demonstrated insight; Focused;Tracking   Hallucinations Other  (None reported)   Patient Response Asks questions; Attentive; Awareness of social/physical boundaries; Follows direction;Good eye contact; Interactive;Nonverbal feedback; Took notes   Task Performance Follows directions; Needs clarification   Safety Concerns Other  (SI=0, HI=0, UHS=0)   Degree Patient Ready for Discharge Yes   Group Notes Patient reported the following for check-in. Improved Concerns: âPositive, sure of myselfâ. Sx of concern: âmental relapseâ. Patient shared that he was feeling shaky this morning and that he struggles to understand the sx rating sheet at times. Goals for the day: âFocus on discharge with positive thoughtsâ.        MICHELLE Ramirez "including nausea, dizziness, and her \"pounding chest\".     1205: Communication with Dr. Garcia via secure chat - made her aware of pt's reaction and its' resolve. Plan is for Dr. Garcia to come to see patient but will likely plan to re-challenge the Docetaxel at quarter rates to try to complete the treatment.      1215: Dr. Garcia present to evaluate patient. Patient agreeable with proceeding with treatment at slower rates and is feeling \"much better\".     1230: Initiated Docetaxel for a 2nd attempt - quarter rate - 68.5 mL/hr with volume: 17.1 mL. RN remained in the room to observe for a hypersensitivity reaction.  1244:  Patient began to report \"a feeling coming across my chest\" and redness/red splotches were beginning to form across her chest. RN immediately stopped infusion, initiated another Rapid Response, and disconnected patient from chemo.   1245: Patient reporting that she felt like her heart was beginning to pound again and wanted to stop it before any other symptoms started. Dr. Garcia present.   1247: Pepcid 20mg IV push administered. Vitals signs - BP: 140/88, HR: 113, temp: 35.8, pulse ox: 99%  1249: Solumedrol 40mg IV push administered  1250: Benadryl 25mg IV push administered  1252: Patient reporting that her \"pounding chest\" was getting better and it was noticeable that redness/red splotches were resolving on her chest.   1253: Vital signs - BP: 125/85, HR: 103, pulse ox: 98% RA  1254: In total (with both treatment attempts), the patient received 38.18 mL of Docetaxel, which was likely much less d/t IV tubing initially being primed with D5W. Decision made by Dr. Garcia to permanently discontinue Docetaxel and plan to treat patient later this week with Abraxane and Cytoxan. Per Dr. Garcia, plan is to have patient remain under observation for the next 30 minutes and then as long as patient is stable and comfortable with leaving, she can be discharged to her home. Patient will not receive Cytoxan or " Neulasta Onpro today, but will still receive her Lupron injection. Patient agreeable.

## 2025-03-10 NOTE — SIGNIFICANT EVENT
03/10/25 1031   Prechemo Checklist   Has the patient been in the hospital, ED, or urgent care since last date of service No   Chemo/Immuno Consent Completed and Signed Yes  (1/20/25)   Protocol/Indications Verified Yes   Confirmed to previous date/time of medication Yes  (C2D1 today)   Compared to previous dose Yes  (aloxi switched to zofran to see if it helps prevent pt's post treatment headache(s))   All medications are dated accurately Yes   Pregnancy Test Negative Not applicable   Parameters Met Yes  (ANC: 3.10, PLTs: 385, AST: 13, ALT:: 21, bili: 0.2, CrCl = greater than 30mL/min)   BSA/Weight-Height Verified Yes   Dose Calculations Verified (current, total, cumulative) Yes        Evaluation by psychiatric service required

## 2025-03-10 NOTE — PROGRESS NOTES
NUTRITION Assessment NOTE    Nutrition Assessment     Reason for Visit:  Ligia Franklin is a 29 y.o. female with breast cancer, currently receiving TC.     Patient Active Problem List   Diagnosis    Abdominal left upper quadrant tenderness    Abnormal finding on breast imaging    BRCA2 positive    Dense breast    Ductal carcinoma in situ (DCIS) of right breast    Low vitamin D level    Muscle tension headache    Nipple discharge in female    Tailbone injury    Contraception management    Encounter for follow-up surveillance of breast cancer    S/P bilateral mastectomy    Difficulty maintaining weight    Malignant neoplasm of female breast    S/P breast reconstruction, bilateral    Postoperative breast asymmetry    PONV (postoperative nausea and vomiting)    Depression       Nutrition Significant Labs:  Lab Results   Component Value Date/Time    GLUCOSE 117 (H) 03/10/2025 0935     03/10/2025 0935    K 4.4 03/10/2025 0935     03/10/2025 0935    CO2 27 03/10/2025 0935    ANIONGAP 12 03/10/2025 0935    BUN 18 03/10/2025 0935    CREATININE 0.79 03/10/2025 0935    EGFR >90 03/10/2025 0935    CALCIUM 9.1 03/10/2025 0935    ALBUMIN 4.3 03/10/2025 0935    ALKPHOS 75 03/10/2025 0935    PROT 6.8 03/10/2025 0935    AST 13 03/10/2025 0935    BILITOT 0.2 03/10/2025 0935    ALT 21 03/10/2025 0935     Lab Results   Component Value Date/Time    VITD25 25 (L) 11/16/2024 0852     CMP Trend:    Recent Labs     03/10/25  0935 02/11/25  0855 11/16/24  0852   GLUCOSE 117* 99 76    138 141   K 4.4 4.7 4.3    106 108*   CO2 27 27 27   ANIONGAP 12 10 10   BUN 18 14 13   CREATININE 0.79 0.84 0.98   EGFR >90 >90 81   CALCIUM 9.1 8.9 8.6   ALBUMIN 4.3 4.4 3.7   ALKPHOS 75 52 74   PROT 6.8 7.1 6.1*   AST 13 13 17   BILITOT 0.2 0.3 0.2   ALT 21 15 18    and DM Specific Labs Trend (Includes HgbA1C, antibodies & fasting insulin):   Recent Labs     11/16/24  0852 04/23/22  0827 03/25/21  1342   HGBA1C 5.4 5.2 5.2  "      Anthropometrics:  Height: 164.4 cm (5' 4.72\")   Weight: 70.9 kg (156 lb 4.9 oz)   BMI (Calculated): 26.23    IBW/kg (Dietitian Calculated): 56.18 kg Percent of IBW: 126.2 %          Weight History:   Daily Weight  03/10/25 : 70.9 kg (156 lb 4.9 oz)  03/10/25 : 70.9 kg (156 lb 4.9 oz)  02/25/25 : 67.6 kg (149 lb 0.5 oz)  02/18/25 : 68.8 kg (151 lb 10.8 oz)  02/17/25 : 70 kg (154 lb 3.4 oz)  02/11/25 : 70 kg (154 lb 5.2 oz)  02/10/25 : 65.3 kg (144 lb)  01/22/25 : 65.3 kg (144 lb)  01/20/25 : 64.7 kg (142 lb 10.2 oz)  12/30/24 : 60.3 kg (132 lb 15 oz)    Weight Change %:       Nutrition History:  Food & Nutrition History:  Patient endorses mouth sores, decreased appetite, metallic taste changes, as well as N/V the week after treatment. She did receive IVFs in the days following 1st chemo. She is receiving anti-emetics and other supportive meds. She is using [employer] protein drinks on days when appetite and intake is poor.     Medications:  Current Outpatient Medications   Medication Instructions    ascorbic acid (VITAMIN C) 500 mg, Daily    dexAMETHasone (Decadron) 4 mg tablet Take 2 tablets in the AM and PM for 3 days starting the day after chemo    ibuprofen 800 mg, oral, Every 6 hours PRN    OLANZapine (ZYPREXA) 2.5 mg, oral, Nightly, Start night of chemo    ondansetron (ZOFRAN) 8 mg, oral, Every 8 hours PRN    ondansetron ODT (ZOFRAN-ODT) 4 mg, oral, Every 8 hours PRN    prochlorperazine (COMPAZINE) 10 mg, oral, Every 6 hours PRN    scopolamine (Transderm-Scop) 1 mg over 3 days patch 3 day 1 patch, transdermal, Every 72 hours       Estimated Needs:  Total Energy Estimated Needs in 24 hours (kCal): 1772.5 kCal  Method for Estimating Needs: 25 kcal/kg CBW  Total Protein Estimated Needs in 24 Hours (g): 85.08 g  Method for Estimating 24 Hour Protein Needs: 1.2g/kg CBW  Total Fluid Estimated Needs in 24 Hours (mL): 1772 mL  Method for Estimating 24 Hour Fluid Needs: 1 mL/kcal      Nutrition Diagnosis "        Nutrition Diagnosis  Patient has Nutrition Diagnosis: Yes  Nutrition Diagnosis 1: Altered GI function  Related to (1): N/V, mouth sores, and taste changes following tx  As Evidenced by (1): subjective reports of suboptimal PO intake x1 week following treatment d/t adverse treatment side effects       Nutrition Interventions/Recommendations   Nutrition Prescription:        Nutrition Interventions:   Food and Nutrient Delivery:       Coordination of Care:       Nutrition Education:   Nutrition Education Content:  Reviewed strategies to help maintain adequate PO intake following treatment. Provided education materials to help reinforce topics discussed. We talked about ensuring adequate protein intake and reviewed foods that she may tolerate well if feeling poorly. We also discussed strategies for managing taste changes. Introduced her to Enterade, which may help reduce GI toxicity following treatment, including reducing the incidence and severity of mouth sores. Patient verbalized understanding.        Nutrition Monitoring and Evaluation   Food and Nutrient Intake  Monitoring and Evaluation Plan: Energy intake, Fluid intake, Meal/snack pattern    Anthropometric measurements  Monitoring and Evaluation Plan: Weight  Criteria: weight and LBM maintenance         Nutrition Focused Physical Findings  Monitoring and Evaluation Plan: Digestive System  Criteria: monitor for any GI toxicity following treatment and adjust plan as needed         Follow Up: RD contact information provided. This service will continue to follow.

## 2025-03-13 ENCOUNTER — INFUSION (OUTPATIENT)
Dept: HEMATOLOGY/ONCOLOGY | Facility: CLINIC | Age: 30
End: 2025-03-13
Payer: COMMERCIAL

## 2025-03-13 ENCOUNTER — APPOINTMENT (OUTPATIENT)
Dept: HEMATOLOGY/ONCOLOGY | Facility: CLINIC | Age: 30
End: 2025-03-13
Payer: COMMERCIAL

## 2025-03-13 VITALS
OXYGEN SATURATION: 97 % | SYSTOLIC BLOOD PRESSURE: 120 MMHG | HEART RATE: 101 BPM | RESPIRATION RATE: 16 BRPM | WEIGHT: 153.66 LBS | BODY MASS INDEX: 25.79 KG/M2 | DIASTOLIC BLOOD PRESSURE: 79 MMHG | TEMPERATURE: 97.9 F

## 2025-03-13 DIAGNOSIS — D05.11 DUCTAL CARCINOMA IN SITU (DCIS) OF RIGHT BREAST: ICD-10-CM

## 2025-03-13 DIAGNOSIS — C50.011 MALIGNANT NEOPLASM OF NIPPLE OF RIGHT BREAST IN FEMALE, ESTROGEN RECEPTOR POSITIVE: ICD-10-CM

## 2025-03-13 DIAGNOSIS — Z17.0 MALIGNANT NEOPLASM OF NIPPLE OF RIGHT BREAST IN FEMALE, ESTROGEN RECEPTOR POSITIVE: ICD-10-CM

## 2025-03-13 PROCEDURE — 96413 CHEMO IV INFUSION 1 HR: CPT

## 2025-03-13 PROCEDURE — 2500000004 HC RX 250 GENERAL PHARMACY W/ HCPCS (ALT 636 FOR OP/ED): Performed by: INTERNAL MEDICINE

## 2025-03-13 PROCEDURE — 96417 CHEMO IV INFUS EACH ADDL SEQ: CPT

## 2025-03-13 PROCEDURE — 2500000004 HC RX 250 GENERAL PHARMACY W/ HCPCS (ALT 636 FOR OP/ED)

## 2025-03-13 PROCEDURE — 2500000001 HC RX 250 WO HCPCS SELF ADMINISTERED DRUGS (ALT 637 FOR MEDICARE OP): Performed by: INTERNAL MEDICINE

## 2025-03-13 PROCEDURE — 96377 APPLICATON ON-BODY INJECTOR: CPT

## 2025-03-13 RX ORDER — DIPHENHYDRAMINE HCL 25 MG
25 CAPSULE ORAL ONCE
Status: COMPLETED | OUTPATIENT
Start: 2025-03-13 | End: 2025-03-13

## 2025-03-13 RX ORDER — ONDANSETRON HYDROCHLORIDE 8 MG/1
16 TABLET, FILM COATED ORAL ONCE
Status: COMPLETED | OUTPATIENT
Start: 2025-03-13 | End: 2025-03-13

## 2025-03-13 RX ORDER — EPINEPHRINE 0.3 MG/.3ML
0.3 INJECTION SUBCUTANEOUS EVERY 5 MIN PRN
Status: DISCONTINUED | OUTPATIENT
Start: 2025-03-13 | End: 2025-03-13 | Stop reason: HOSPADM

## 2025-03-13 RX ORDER — DEXAMETHASONE 6 MG/1
12 TABLET ORAL ONCE
Status: COMPLETED | OUTPATIENT
Start: 2025-03-13 | End: 2025-03-13

## 2025-03-13 RX ORDER — FAMOTIDINE 10 MG/ML
20 INJECTION, SOLUTION INTRAVENOUS ONCE AS NEEDED
Status: DISCONTINUED | OUTPATIENT
Start: 2025-03-13 | End: 2025-03-13 | Stop reason: HOSPADM

## 2025-03-13 RX ORDER — ALBUTEROL SULFATE 0.83 MG/ML
3 SOLUTION RESPIRATORY (INHALATION) AS NEEDED
Status: DISCONTINUED | OUTPATIENT
Start: 2025-03-13 | End: 2025-03-13 | Stop reason: HOSPADM

## 2025-03-13 RX ORDER — HEPARIN 100 UNIT/ML
500 SYRINGE INTRAVENOUS AS NEEDED
OUTPATIENT
Start: 2025-03-13

## 2025-03-13 RX ORDER — HEPARIN SODIUM,PORCINE/PF 10 UNIT/ML
50 SYRINGE (ML) INTRAVENOUS AS NEEDED
OUTPATIENT
Start: 2025-03-13

## 2025-03-13 RX ORDER — DIPHENHYDRAMINE HYDROCHLORIDE 50 MG/ML
50 INJECTION INTRAMUSCULAR; INTRAVENOUS AS NEEDED
Status: DISCONTINUED | OUTPATIENT
Start: 2025-03-13 | End: 2025-03-13 | Stop reason: HOSPADM

## 2025-03-13 RX ADMIN — ONDANSETRON HYDROCHLORIDE 16 MG: 8 TABLET, FILM COATED ORAL at 10:35

## 2025-03-13 RX ADMIN — DEXAMETHASONE 12 MG: 6 TABLET ORAL at 10:36

## 2025-03-13 RX ADMIN — PEGFILGRASTIM 6 MG: KIT SUBCUTANEOUS at 12:12

## 2025-03-13 RX ADMIN — CYCLOPHOSPHAMIDE 1000 MG: 1 INJECTION, POWDER, FOR SOLUTION INTRAVENOUS; ORAL at 12:06

## 2025-03-13 RX ADMIN — PACLITAXEL 445 MG: 100 INJECTION, POWDER, LYOPHILIZED, FOR SUSPENSION INTRAVENOUS at 11:21

## 2025-03-13 RX ADMIN — DIPHENHYDRAMINE HYDROCHLORIDE 25 MG: 25 CAPSULE ORAL at 10:35

## 2025-03-13 ASSESSMENT — PAIN SCALES - GENERAL: PAINLEVEL_OUTOF10: 0-NO PAIN

## 2025-03-13 NOTE — SIGNIFICANT EVENT
03/13/25 1005   Prechemo Checklist   Has the patient been in the hospital, ED, or urgent care since last date of service No   Chemo/Immuno Consent Completed and Signed Yes  (Taxotere - 1/20/25 & Abraxane - 3/13/25)   Protocol/Indications Verified Yes   Confirmed to previous date/time of medication N/A  (C1D1 today)   Compared to previous dose N/A  (C1D1 today)   All medications are dated accurately Yes   Pregnancy Test Negative Yes  (2/11/25)   Parameters Met Yes  (labs done 3/10 - ANC: 3.10, PLTs: 385, AST: 13, ALT: 21, bili: 0.2, CrCl = greater than 30mL/min)   BSA/Weight-Height Verified Yes   Dose Calculations Verified (current, total, cumulative) Yes

## 2025-03-31 ENCOUNTER — APPOINTMENT (OUTPATIENT)
Dept: HEMATOLOGY/ONCOLOGY | Facility: CLINIC | Age: 30
End: 2025-03-31
Payer: COMMERCIAL

## 2025-03-31 ASSESSMENT — ENCOUNTER SYMPTOMS
HOT FLASHES: 1
FEVER: 0
CARDIOVASCULAR NEGATIVE: 1
DIARRHEA: 0
APPETITE CHANGE: 0
RESPIRATORY NEGATIVE: 1
UNEXPECTED WEIGHT CHANGE: 0
VOMITING: 0
HEMATOLOGIC/LYMPHATIC NEGATIVE: 1
EYES NEGATIVE: 1
CONSTIPATION: 0
FATIGUE: 0

## 2025-03-31 NOTE — PROGRESS NOTES
Patient ID: Ligia Franklin is a 29 y.o. female.  Diagnosis:  Right breast IDC  MedOn: Dr. Garcia  Primary Care Provider: JENNIFER Ponce  Referring Provider: Marion Garcia MD  [id number] [place] [id number]  Visit Type: Follow Up    Date of Service: 04/03/25  Location: [facility]     Patient Care Team:  JENNIFER Ponce as PCP - General (Internal Medicine)  JENNIFER Escobedo as PCP - [place] [facility] PCP  Tabitha Trevino MD as Obstetrician (Obstetrics and Gynecology)  Marion Garcia MD as Consulting Physician (Hematology and Oncology)  JEWEL Gleason as  (Oncology)  Jennifer Inman RN as Registered Nurse (Hematology and Oncology)    Current Therapy: TC     ONCOLOGIC HISTORY     Malignant neoplasm of female breast, Clinical: Stage IB (cT1, cN0, cM0, G3, ER+, CA-, HER2-)    Right breast IDC, stage 1 [id number], ER+95/CA+35, [id number] +BRCA2  - initially diagnosed after new brown nipple discharge during shower shortly after her son was born and was still breastfeeding  - Dx 2/9/2023 with 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 It was ER+95%, CA+35%  - Given her young age of onset and unknown biological history as she was adopted, she was genetically tested and tested positive for the BRCA2 mutation  -  3/31/2023 Dr. Addis العلي performed bilateeral skin sparing mastectomy and right sentinel lymph node biopsy (0/3), DCIS spanning 5cm on path.Focal 1mm anterior margin. Left skin sparing mastectomy showed benign findings. Dr. Jim Scruggs plastics did immediate reconstruction with bilateral prepectoral direct saline implants  - Reviewed at tumor board and no re-excision recommended and no RT  - Given DCIS findings alone and s/p bilateral mastectomy tumor there was not indication for endocrine therapy  - She also met with gyn/onc Dr. Tabitha Trevino and ppx oopherectomy was recommended when she was done with child  bearing.  - follow up 10/21/24 for annual follow with plastics Dr Scruggs and reported discomfort with implants moving around and felt they were too large expressing interest in revision reconstruction  -  11/12/24 she underwent bilateral revision reconstruction with implant exchange, and fat grafting. Unfortunately on pathology she was found to have IDC G3, measuring 5mm in greatest dimension involving fibrous and fibroadipose tissue with DCIS and margins could not be assessed  - reexcision on 12/20/24 and noted to have two foci of IDC with DCIS with a positive new inferior margin. Final tumor size 12 mm overall grade 2. pT1cNx  - Oncotype completed and found to have RS 31  - 1/22/25 - revision of right breast reconstruction, right breast capsulotomy, right breast margin re-excision and partial mastectomy   - Given oncotype score 31 and her young age adjuvant chemotherapy was recommended  - We discussed Taxotere + Cytoxan given node negative and also discussed AC+T however due to her schedule and concern for longer term toxicity, they are very reluctant and reviewed there is not strong data that anthracycline based therapy is needed at this time  - 2/3/25 - seen by Spring Ferrera for fertility preservation, elected to not proceed   - 2/17/25 - Cycle 1 Day 1 TC with scalp cooling   3/10/25: Cycle 2 TC - reaction to taxotere   3/13/25: Received abraxane + Cytoxan for Cycle 2   4/3/25: Cycle 3 Cytoxan + Abraxane    Oncology History   Malignant neoplasm of female breast   6/5/2024 Initial Diagnosis    Malignant neoplasm of female breast     12/10/2024 Cancer Staged    Staging form: Breast, AJCC 8th Edition, Clinical: Stage IB (cT1, cN0, cM0, G3, ER+, UT-, HER2-) - Signed by Addis العلي DO on 12/10/2024     2/17/2025 -  Chemotherapy    CUSTOM Abraxane + CycloPHOSphamide, 21 Day Cycles        Other Contributing History    Subjective      INTERVAL HISTORY     Ligia Franklin is a 29 y.o. female who presents today for  follow up of breast cancer. Patient of Dr. Garcia currently on abraxane + Cytoxan and monthly Lupron. She is doing well. Has some nausea and body aches. Aching pain relieved with IBU x 1 week post treatment. Tolerating abraxane better than the taxotere. She did end up with alopecia despite the cold caps. She is experiencing hot flashes. She would like to try delta 9 THC and CBD oil for her nausea, sleep disturbance, and body aches. No issues with constipation or diarrhea. No signs of infection. All side effects tolerable at this time. She is complaining of left hip cramping pain.     Review of Systems   Constitutional:  Negative for appetite change, fatigue, fever and unexpected weight change.   HENT:   Negative for mouth sores.    Eyes: Negative.    Respiratory: Negative.     Cardiovascular: Negative.    Gastrointestinal:  Positive for nausea. Negative for constipation, diarrhea and vomiting.   Endocrine: Positive for hot flashes.   Genitourinary: Negative.     Musculoskeletal:  Positive for arthralgias.   Skin: Negative.    Neurological:  Negative for headaches.   Hematological: Negative.    Psychiatric/Behavioral:  Positive for sleep disturbance.      Objective      /79   Pulse 87   Temp 36.7 °C (98.1 °F)   Resp 18   Wt 73 kg (160 lb 15 oz)   SpO2 96%   BMI 27.01 kg/m²   BSA: 1.83 meters squared    Wt Readings from Last 5 Encounters:   04/03/25 73 kg (160 lb 15 oz)   03/13/25 69.7 kg (153 lb 10.6 oz)   03/10/25 70.9 kg (156 lb 4.9 oz)   03/10/25 70.9 kg (156 lb 4.9 oz)   02/25/25 67.6 kg (149 lb 0.5 oz)     Performance Status:  ECOG Score: 0- Fully active, able to carry on all pre-disease performance w/o restriction.  Karnofsky Score: 90 - Able to carry on normal activity; minor signs or symptoms of disease     PHYSICAL EXAM   Physical Exam  Constitutional:       General: She is not in acute distress.     Appearance: Normal appearance. She is not toxic-appearing.   HENT:      Head: Normocephalic and  "atraumatic.   Eyes:      Pupils: Pupils are equal, round, and reactive to light.   Pulmonary:      Effort: Pulmonary effort is normal.   Musculoskeletal:         General: Normal range of motion.      Cervical back: Normal range of motion.   Skin:     Comments: alopecia   Neurological:      General: No focal deficit present.      Mental Status: She is alert and oriented to person, place, and time.      Motor: No weakness.   Psychiatric:         Mood and Affect: Mood normal.         Behavior: Behavior normal.         Thought Content: Thought content normal.         Judgment: Judgment normal.         Allergies  Allergies   Allergen Reactions    Docetaxel Palpitations     Patient reporting overall not feeling well, mild to moderate facial flushing, a warmth coming over her like she's going to pass out, some dizziness, slight nausea, and a feeling of a \"pounding chest\".      Medications  Current Outpatient Medications   Medication Instructions    ascorbic acid (VITAMIN C) 500 mg, Daily    ibuprofen 800 mg, oral, Every 6 hours PRN    OLANZapine (ZYPREXA) 2.5 mg, oral, Nightly, Start night of chemo    ondansetron (ZOFRAN) 8 mg, oral, Every 8 hours PRN    ondansetron ODT (ZOFRAN-ODT) 4 mg, oral, Every 8 hours PRN    prochlorperazine (COMPAZINE) 10 mg, oral, Every 6 hours PRN    scopolamine (Transderm-Scop) 1 mg over 3 days patch 3 day 1 patch, transdermal, Every 72 hours        Diagnostic Results   RESULTS     Results for orders placed or performed in visit on 04/02/25 (from the past 96 hours)   CBC and Auto Differential   Result Value Ref Range    WBC 6.3 4.4 - 11.3 x10*3/uL    RBC 3.68 (L) 4.00 - 5.20 x10*6/uL    Hemoglobin 11.4 (L) 12.0 - 16.0 g/dL    Hematocrit 34.5 (L) 36.0 - 46.0 %    MCV 94 80 - 100 fL    MCH 31.0 26.0 - 34.0 pg    MCHC 33.0 32.0 - 36.0 g/dL    RDW 14.4 11.5 - 14.5 %    Platelets 339 150 - 450 x10*3/uL    Neutrophils % 52.8 40.0 - 80.0 %    Immature Granulocytes %, Automated 0.3 0.0 - 0.9 %    " Lymphocytes % 32.9 13.0 - 44.0 %    Monocytes % 11.1 2.0 - 10.0 %    Eosinophils % 2.1 0.0 - 6.0 %    Basophils % 0.8 0.0 - 2.0 %    Neutrophils Absolute 3.33 1.20 - 7.70 x10*3/uL    Immature Granulocytes Absolute, Automated 0.02 0.00 - 0.70 x10*3/uL    Lymphocytes Absolute 2.07 1.20 - 4.80 x10*3/uL    Monocytes Absolute 0.70 0.10 - 1.00 x10*3/uL    Eosinophils Absolute 0.13 0.00 - 0.70 x10*3/uL    Basophils Absolute 0.05 0.00 - 0.10 x10*3/uL   Comprehensive metabolic panel   Result Value Ref Range    Glucose 104 (H) 74 - 99 mg/dL    Sodium 139 136 - 145 mmol/L    Potassium 4.2 3.5 - 5.3 mmol/L    Chloride 104 98 - 107 mmol/L    Bicarbonate 28 21 - 32 mmol/L    Anion Gap 11 10 - 20 mmol/L    Urea Nitrogen 14 6 - 23 mg/dL    Creatinine 0.83 0.50 - 1.05 mg/dL    eGFR >90 >60 mL/min/1.73m*2    Calcium 9.0 8.6 - 10.3 mg/dL    Albumin 4.5 3.4 - 5.0 g/dL    Alkaline Phosphatase 73 33 - 110 U/L    Total Protein 6.6 6.4 - 8.2 g/dL    AST 14 9 - 39 U/L    Bilirubin, Total 0.3 0.0 - 1.2 mg/dL    ALT 25 7 - 45 U/L     Assessment/Plan   ASSESSMENT     Ligia Franklin is a 29 y.o. female with right breast IDC ER/ME +, HER2 negative, BRCA 2 pT1cNx s/p surgery on 1/22/2025 and started on adjuvant TC. Severe reaction to Cycle 2 Taxotere and was switched to Abraxane + Cytoxan. Today she presents for evaluation prior to Cycle 3.     She tolerated Cycle 2 well with tolerable, expected side effects. She is having hot flashes and sleep disturbances. She has some nausea but it is controlled with medication. She gets body aches and takes IBU x 1 week. Okay to use delta 9 THC and CBD oils for these side effects. She did end up with alopecia despite the use of scalp cooling and is considering discontinuing the caps going forward. Today she mentioned a cramping left hip pain that I asked her to monitor. Her blood work from yesterday is acceptable for treatment, mild anemia.     PLAN     # Breast Cancer   - Proceed with Cycle 3 Abraxane +  Cytoxan every 21 days  - Lupron every 28 days - Due 4/7   - Neulasta with each cycle    # Hot flashes   - Fans, clothing layers, cooling blanket    # Nausea   - As needed antiemetics   - Olanzapine x 1 week post chemo    # Body aches  - IBU as needed  - OK for delta 9 THC gummies and CBD  - Monitor left hip pan    Follow up in 3 weeks with Dr. Garcia and C4 as scheduled.     Ligia was seen today for follow-up.  Diagnoses and all orders for this visit:  Malignant neoplasm of nipple of right breast in female, estrogen receptor positive  -     Clinic Appointment Request  Other orders  -     Cancel: ondansetron (Zofran) tablet 16 mg      CUSTOM Abraxane + CycloPHOSphamide, 21 Day Cycles  Venous Access Orders  Leuprolide Acetate, Every 28 Days    Patient verbalizes understanding of above plan. Time provided for patient's questions. All questions answered to patient's satisfaction in office. Patient instructed to reach out for any new concerning issues at [phone].    Shara Paulson MSN, APRN, A-GNP-C, AOCNP  [facility]  Division of Hematology & Medical Oncology   [address] [place] [address]  Phone: [phone]  Available via [employer] Secure Chat  [email or web address]

## 2025-04-01 ENCOUNTER — PATIENT MESSAGE (OUTPATIENT)
Dept: HEMATOLOGY/ONCOLOGY | Facility: CLINIC | Age: 30
End: 2025-04-01
Payer: COMMERCIAL

## 2025-04-01 DIAGNOSIS — Z17.0 MALIGNANT NEOPLASM OF NIPPLE OF RIGHT BREAST IN FEMALE, ESTROGEN RECEPTOR POSITIVE: ICD-10-CM

## 2025-04-01 DIAGNOSIS — C50.011 MALIGNANT NEOPLASM OF NIPPLE OF RIGHT BREAST IN FEMALE, ESTROGEN RECEPTOR POSITIVE: ICD-10-CM

## 2025-04-02 ENCOUNTER — LAB (OUTPATIENT)
Dept: LAB | Facility: CLINIC | Age: 30
End: 2025-04-02
Payer: COMMERCIAL

## 2025-04-02 DIAGNOSIS — Z17.0 MALIGNANT NEOPLASM OF NIPPLE OF RIGHT BREAST IN FEMALE, ESTROGEN RECEPTOR POSITIVE: ICD-10-CM

## 2025-04-02 DIAGNOSIS — C50.011 MALIGNANT NEOPLASM OF NIPPLE OF RIGHT BREAST IN FEMALE, ESTROGEN RECEPTOR POSITIVE: ICD-10-CM

## 2025-04-02 LAB
ALBUMIN SERPL BCP-MCNC: 4.5 G/DL (ref 3.4–5)
ALP SERPL-CCNC: 73 U/L (ref 33–110)
ALT SERPL W P-5'-P-CCNC: 25 U/L (ref 7–45)
ANION GAP SERPL CALC-SCNC: 11 MMOL/L (ref 10–20)
AST SERPL W P-5'-P-CCNC: 14 U/L (ref 9–39)
BASOPHILS # BLD AUTO: 0.05 X10*3/UL (ref 0–0.1)
BASOPHILS NFR BLD AUTO: 0.8 %
BILIRUB SERPL-MCNC: 0.3 MG/DL (ref 0–1.2)
BUN SERPL-MCNC: 14 MG/DL (ref 6–23)
CALCIUM SERPL-MCNC: 9 MG/DL (ref 8.6–10.3)
CHLORIDE SERPL-SCNC: 104 MMOL/L (ref 98–107)
CO2 SERPL-SCNC: 28 MMOL/L (ref 21–32)
CREAT SERPL-MCNC: 0.83 MG/DL (ref 0.5–1.05)
EGFRCR SERPLBLD CKD-EPI 2021: >90 ML/MIN/1.73M*2
EOSINOPHIL # BLD AUTO: 0.13 X10*3/UL (ref 0–0.7)
EOSINOPHIL NFR BLD AUTO: 2.1 %
ERYTHROCYTE [DISTWIDTH] IN BLOOD BY AUTOMATED COUNT: 14.4 % (ref 11.5–14.5)
GLUCOSE SERPL-MCNC: 104 MG/DL (ref 74–99)
HCT VFR BLD AUTO: 34.5 % (ref 36–46)
HGB BLD-MCNC: 11.4 G/DL (ref 12–16)
IMM GRANULOCYTES # BLD AUTO: 0.02 X10*3/UL (ref 0–0.7)
IMM GRANULOCYTES NFR BLD AUTO: 0.3 % (ref 0–0.9)
LYMPHOCYTES # BLD AUTO: 2.07 X10*3/UL (ref 1.2–4.8)
LYMPHOCYTES NFR BLD AUTO: 32.9 %
MCH RBC QN AUTO: 31 PG (ref 26–34)
MCHC RBC AUTO-ENTMCNC: 33 G/DL (ref 32–36)
MCV RBC AUTO: 94 FL (ref 80–100)
MONOCYTES # BLD AUTO: 0.7 X10*3/UL (ref 0.1–1)
MONOCYTES NFR BLD AUTO: 11.1 %
NEUTROPHILS # BLD AUTO: 3.33 X10*3/UL (ref 1.2–7.7)
NEUTROPHILS NFR BLD AUTO: 52.8 %
PLATELET # BLD AUTO: 339 X10*3/UL (ref 150–450)
POTASSIUM SERPL-SCNC: 4.2 MMOL/L (ref 3.5–5.3)
PROT SERPL-MCNC: 6.6 G/DL (ref 6.4–8.2)
RBC # BLD AUTO: 3.68 X10*6/UL (ref 4–5.2)
SODIUM SERPL-SCNC: 139 MMOL/L (ref 136–145)
WBC # BLD AUTO: 6.3 X10*3/UL (ref 4.4–11.3)

## 2025-04-02 PROCEDURE — 36415 COLL VENOUS BLD VENIPUNCTURE: CPT

## 2025-04-02 PROCEDURE — 85025 COMPLETE CBC W/AUTO DIFF WBC: CPT

## 2025-04-02 PROCEDURE — 80053 COMPREHEN METABOLIC PANEL: CPT

## 2025-04-03 ENCOUNTER — INFUSION (OUTPATIENT)
Dept: HEMATOLOGY/ONCOLOGY | Facility: CLINIC | Age: 30
End: 2025-04-03
Payer: COMMERCIAL

## 2025-04-03 ENCOUNTER — OFFICE VISIT (OUTPATIENT)
Dept: HEMATOLOGY/ONCOLOGY | Facility: CLINIC | Age: 30
End: 2025-04-03
Payer: COMMERCIAL

## 2025-04-03 VITALS
BODY MASS INDEX: 27.01 KG/M2 | OXYGEN SATURATION: 96 % | DIASTOLIC BLOOD PRESSURE: 79 MMHG | WEIGHT: 160.94 LBS | TEMPERATURE: 98.1 F | SYSTOLIC BLOOD PRESSURE: 122 MMHG | RESPIRATION RATE: 18 BRPM | HEART RATE: 87 BPM

## 2025-04-03 DIAGNOSIS — D05.11 DUCTAL CARCINOMA IN SITU (DCIS) OF RIGHT BREAST: ICD-10-CM

## 2025-04-03 DIAGNOSIS — Z17.0 MALIGNANT NEOPLASM OF NIPPLE OF RIGHT BREAST IN FEMALE, ESTROGEN RECEPTOR POSITIVE: ICD-10-CM

## 2025-04-03 DIAGNOSIS — C50.011 MALIGNANT NEOPLASM OF NIPPLE OF RIGHT BREAST IN FEMALE, ESTROGEN RECEPTOR POSITIVE: ICD-10-CM

## 2025-04-03 PROCEDURE — 96377 APPLICATON ON-BODY INJECTOR: CPT

## 2025-04-03 PROCEDURE — 2500000004 HC RX 250 GENERAL PHARMACY W/ HCPCS (ALT 636 FOR OP/ED)

## 2025-04-03 PROCEDURE — 2500000004 HC RX 250 GENERAL PHARMACY W/ HCPCS (ALT 636 FOR OP/ED): Performed by: INTERNAL MEDICINE

## 2025-04-03 PROCEDURE — 2500000004 HC RX 250 GENERAL PHARMACY W/ HCPCS (ALT 636 FOR OP/ED): Mod: JZ,TB | Performed by: INTERNAL MEDICINE

## 2025-04-03 PROCEDURE — 2500000001 HC RX 250 WO HCPCS SELF ADMINISTERED DRUGS (ALT 637 FOR MEDICARE OP): Performed by: INTERNAL MEDICINE

## 2025-04-03 PROCEDURE — 96413 CHEMO IV INFUSION 1 HR: CPT

## 2025-04-03 PROCEDURE — 96417 CHEMO IV INFUS EACH ADDL SEQ: CPT

## 2025-04-03 PROCEDURE — 99214 OFFICE O/P EST MOD 30 MIN: CPT

## 2025-04-03 PROCEDURE — 99214 OFFICE O/P EST MOD 30 MIN: CPT | Mod: 25

## 2025-04-03 RX ORDER — HEPARIN 100 UNIT/ML
500 SYRINGE INTRAVENOUS AS NEEDED
Status: DISCONTINUED | OUTPATIENT
Start: 2025-04-03 | End: 2025-04-03 | Stop reason: HOSPADM

## 2025-04-03 RX ORDER — HEPARIN SODIUM,PORCINE/PF 10 UNIT/ML
50 SYRINGE (ML) INTRAVENOUS AS NEEDED
Status: DISCONTINUED | OUTPATIENT
Start: 2025-04-03 | End: 2025-04-03 | Stop reason: HOSPADM

## 2025-04-03 RX ORDER — DIPHENHYDRAMINE HCL 25 MG
25 CAPSULE ORAL ONCE
Status: COMPLETED | OUTPATIENT
Start: 2025-04-03 | End: 2025-04-03

## 2025-04-03 RX ORDER — DIPHENHYDRAMINE HYDROCHLORIDE 50 MG/ML
50 INJECTION, SOLUTION INTRAMUSCULAR; INTRAVENOUS AS NEEDED
Status: DISCONTINUED | OUTPATIENT
Start: 2025-04-03 | End: 2025-04-03 | Stop reason: HOSPADM

## 2025-04-03 RX ORDER — HEPARIN SODIUM,PORCINE/PF 10 UNIT/ML
50 SYRINGE (ML) INTRAVENOUS AS NEEDED
OUTPATIENT
Start: 2025-04-03

## 2025-04-03 RX ORDER — ONDANSETRON HYDROCHLORIDE 8 MG/1
16 TABLET, FILM COATED ORAL ONCE
Status: COMPLETED | OUTPATIENT
Start: 2025-04-03 | End: 2025-04-03

## 2025-04-03 RX ORDER — ONDANSETRON HYDROCHLORIDE 8 MG/1
16 TABLET, FILM COATED ORAL ONCE
Status: CANCELLED | OUTPATIENT
Start: 2025-04-03 | End: 2025-04-03

## 2025-04-03 RX ORDER — ALBUTEROL SULFATE 0.83 MG/ML
3 SOLUTION RESPIRATORY (INHALATION) AS NEEDED
Status: DISCONTINUED | OUTPATIENT
Start: 2025-04-03 | End: 2025-04-03 | Stop reason: HOSPADM

## 2025-04-03 RX ORDER — HEPARIN 100 UNIT/ML
500 SYRINGE INTRAVENOUS AS NEEDED
OUTPATIENT
Start: 2025-04-03

## 2025-04-03 RX ORDER — PROCHLORPERAZINE EDISYLATE 5 MG/ML
10 INJECTION INTRAMUSCULAR; INTRAVENOUS EVERY 6 HOURS PRN
Status: DISCONTINUED | OUTPATIENT
Start: 2025-04-03 | End: 2025-04-03 | Stop reason: HOSPADM

## 2025-04-03 RX ORDER — FAMOTIDINE 10 MG/ML
20 INJECTION, SOLUTION INTRAVENOUS ONCE AS NEEDED
Status: DISCONTINUED | OUTPATIENT
Start: 2025-04-03 | End: 2025-04-03 | Stop reason: HOSPADM

## 2025-04-03 RX ORDER — DEXAMETHASONE 6 MG/1
12 TABLET ORAL ONCE
Status: COMPLETED | OUTPATIENT
Start: 2025-04-03 | End: 2025-04-03

## 2025-04-03 RX ORDER — PROCHLORPERAZINE MALEATE 10 MG
10 TABLET ORAL EVERY 6 HOURS PRN
Status: DISCONTINUED | OUTPATIENT
Start: 2025-04-03 | End: 2025-04-03 | Stop reason: HOSPADM

## 2025-04-03 RX ORDER — EPINEPHRINE 0.3 MG/.3ML
0.3 INJECTION SUBCUTANEOUS EVERY 5 MIN PRN
Status: DISCONTINUED | OUTPATIENT
Start: 2025-04-03 | End: 2025-04-03 | Stop reason: HOSPADM

## 2025-04-03 RX ADMIN — PEGFILGRASTIM 6 MG: KIT SUBCUTANEOUS at 10:29

## 2025-04-03 RX ADMIN — ONDANSETRON HYDROCHLORIDE 16 MG: 8 TABLET, FILM COATED ORAL at 09:00

## 2025-04-03 RX ADMIN — CYCLOPHOSPHAMIDE 1000 MG: 1 INJECTION, POWDER, FOR SOLUTION INTRAVENOUS; ORAL at 10:11

## 2025-04-03 RX ADMIN — DIPHENHYDRAMINE HYDROCHLORIDE 25 MG: 25 CAPSULE ORAL at 09:00

## 2025-04-03 RX ADMIN — PACLITAXEL 445 MG: 100 INJECTION, POWDER, LYOPHILIZED, FOR SUSPENSION INTRAVENOUS at 09:29

## 2025-04-03 RX ADMIN — DEXAMETHASONE 12 MG: 6 TABLET ORAL at 09:00

## 2025-04-03 ASSESSMENT — PAIN SCALES - GENERAL: PAINLEVEL_OUTOF10: 0-NO PAIN

## 2025-04-03 ASSESSMENT — ENCOUNTER SYMPTOMS
NAUSEA: 1
HEADACHES: 0
SLEEP DISTURBANCE: 1
ARTHRALGIAS: 1

## 2025-04-04 DIAGNOSIS — R11.2 CHEMOTHERAPY INDUCED NAUSEA AND VOMITING: ICD-10-CM

## 2025-04-04 DIAGNOSIS — T45.1X5A CHEMOTHERAPY INDUCED NAUSEA AND VOMITING: ICD-10-CM

## 2025-04-04 RX ORDER — OLANZAPINE 2.5 MG/1
2.5 TABLET ORAL NIGHTLY
Qty: 90 TABLET | Refills: 0 | Status: SHIPPED | OUTPATIENT
Start: 2025-04-04

## 2025-04-07 ENCOUNTER — INFUSION (OUTPATIENT)
Dept: HEMATOLOGY/ONCOLOGY | Facility: CLINIC | Age: 30
End: 2025-04-07
Payer: COMMERCIAL

## 2025-04-07 VITALS
BODY MASS INDEX: 27.42 KG/M2 | DIASTOLIC BLOOD PRESSURE: 79 MMHG | HEART RATE: 104 BPM | RESPIRATION RATE: 16 BRPM | OXYGEN SATURATION: 97 % | SYSTOLIC BLOOD PRESSURE: 115 MMHG | TEMPERATURE: 97.9 F | WEIGHT: 163.36 LBS

## 2025-04-07 DIAGNOSIS — Z17.0 MALIGNANT NEOPLASM OF NIPPLE OF RIGHT BREAST IN FEMALE, ESTROGEN RECEPTOR POSITIVE: ICD-10-CM

## 2025-04-07 DIAGNOSIS — C50.011 MALIGNANT NEOPLASM OF NIPPLE OF RIGHT BREAST IN FEMALE, ESTROGEN RECEPTOR POSITIVE: ICD-10-CM

## 2025-04-07 PROCEDURE — 2500000004 HC RX 250 GENERAL PHARMACY W/ HCPCS (ALT 636 FOR OP/ED): Mod: JZ,TB

## 2025-04-07 PROCEDURE — 96402 CHEMO HORMON ANTINEOPL SQ/IM: CPT

## 2025-04-07 RX ORDER — EPINEPHRINE 0.3 MG/.3ML
0.3 INJECTION SUBCUTANEOUS EVERY 5 MIN PRN
OUTPATIENT
Start: 2025-05-05

## 2025-04-07 RX ORDER — FAMOTIDINE 10 MG/ML
20 INJECTION, SOLUTION INTRAVENOUS ONCE AS NEEDED
OUTPATIENT
Start: 2025-05-05

## 2025-04-07 RX ORDER — DIPHENHYDRAMINE HYDROCHLORIDE 50 MG/ML
50 INJECTION, SOLUTION INTRAMUSCULAR; INTRAVENOUS AS NEEDED
OUTPATIENT
Start: 2025-05-05

## 2025-04-07 RX ORDER — ALBUTEROL SULFATE 0.83 MG/ML
3 SOLUTION RESPIRATORY (INHALATION) AS NEEDED
OUTPATIENT
Start: 2025-05-05

## 2025-04-07 RX ADMIN — LEUPROLIDE ACETATE 3.75 MG: KIT at 13:34

## 2025-04-07 ASSESSMENT — PAIN SCALES - GENERAL: PAINLEVEL_OUTOF10: 5

## 2025-04-08 ENCOUNTER — APPOINTMENT (OUTPATIENT)
Dept: HEMATOLOGY/ONCOLOGY | Facility: CLINIC | Age: 30
End: 2025-04-08
Payer: COMMERCIAL

## 2025-04-09 ENCOUNTER — TELEPHONE (OUTPATIENT)
Dept: HEMATOLOGY/ONCOLOGY | Facility: CLINIC | Age: 30
End: 2025-04-09
Payer: COMMERCIAL

## 2025-04-09 NOTE — TELEPHONE ENCOUNTER
I spoke with Ligia and notified her that I spoke with Shara VARMA NP. She is aware that it is recommended that she follow up with her dentist for further evaluation of the tooth discomfort that she has been feeling. She is aware that if her dental team has any questions or concerns related to her treatment or her care here at [facility], to notify the team for assistance. Ligia gave verbal understanding, was appreciative of the call and all questions were answered at this time.

## 2025-04-09 NOTE — TELEPHONE ENCOUNTER
I spoke with Ligia, she states that she is having tooth discomfort that started 24 hours ago on the left bottom side of the mouth, back near the molars. She states that the pain feels like a nerve pain and it appears when she is chewing any type of food. She states that once she stops chewing the pain subsides. She denies any headaches, neck pain, bleeding gums, open areas in the mouth, pus like drainage in the mouth, issues swallowing/breathing or fevers.  She states that the tooth pain occurred with her previous course of treatment as well and the discomfort lasted 2-3 days.     She did state that she is having some generalized body aches that are now going away and that she is not having any other symptoms at this time.     I notified her that I would update the team of the above and give her a call back with updates. She gave verbal understanding and was appreciative of the call.

## 2025-04-10 ENCOUNTER — TELEPHONE (OUTPATIENT)
Dept: HEMATOLOGY/ONCOLOGY | Facility: CLINIC | Age: 30
End: 2025-04-10
Payer: COMMERCIAL

## 2025-04-10 DIAGNOSIS — Z17.0 MALIGNANT NEOPLASM OF LOWER-OUTER QUADRANT OF RIGHT BREAST OF FEMALE, ESTROGEN RECEPTOR POSITIVE: Primary | ICD-10-CM

## 2025-04-10 DIAGNOSIS — C50.511 MALIGNANT NEOPLASM OF LOWER-OUTER QUADRANT OF RIGHT BREAST OF FEMALE, ESTROGEN RECEPTOR POSITIVE: Primary | ICD-10-CM

## 2025-04-10 NOTE — TELEPHONE ENCOUNTER
I spoke with Ligia, she is aware that Dr. Garcia was updated and that Dr. Garcia is recommending a referral to cardiology (Dr. Collins) for Ligia's current symptoms, she is also aware that if her symptoms worsen or if she develops chest pain that won't go away she should then go to the emergency room. Ligia gave verbal understanding and is aware that the scheduling team will be giving her a call to schedule her referral. She is also aware that if the PAS team does not call her by next Tuesday to schedule, then she could call the team for assistance.     She was also notified that Dr. Garcia did state that a short acting script for ativan can be sent to her preferred pharmacy for anxiety;Ligia was agreeable with this and stated that her preferred pharmacy is [facility] in [place]. She is aware that this script will be sent.    She did state that the discomfort that she felt in the teeth has now subsided and she is aware that it is still recommended that she follow up with her dentist for a work up. She gave verbal understanding of all information, was appreciative of the call and all questions were answered at this time.

## 2025-04-10 NOTE — TELEPHONE ENCOUNTER
"I spoke with Ligia, she states that since her hypersensitivity reaction to her chemotherapy, she notices that she occasionally has dull chest pains on the left side. She states that the pain is in the same spot each time, and it is a 3 or 4 out of 10 on the numeric pain scale. She states that these pains are random and they come and go lasting a few seconds each time. She noticed the pain twice yesterday (once when she was rolling over to grab her water). And she noticed the pain twice today (once when bringing in groceries). Before yesterday these pains have happened a couple of times as well. She is denying SOB, lightheadedness, nausea, numbness/tingling in the arms. There is a \"stretching sensation\" at times when the pain occurs.     She did also state that her dental discomfort has subsided as well and that she was scheduled to see the dentist tomorrow. She does have a message out to her dentist with the update to see if their team still wants to see her.    She did state that she wonders if these pains are due to anxiety or possibly anemia.     She is aware that I will update the team of the above and give her a call back. She was appreciative.   "

## 2025-04-16 NOTE — PROGRESS NOTES
Patient ID: Ligia Franklin is a 30 y.o. female.     Cancer Staging   Malignant neoplasm of female breast  Staging form: Breast, AJCC 8th Edition  - Clinical: Stage IB (cT1, cN0, cM0, G3, ER+, VT-, HER2-) - Signed by Addis العلي DO on 12/10/2024    Oncology History   Malignant neoplasm of female breast   6/5/2024 Initial Diagnosis    Malignant neoplasm of female breast     12/10/2024 Cancer Staged    Staging form: Breast, AJCC 8th Edition, Clinical: Stage IB (cT1, cN0, cM0, G3, ER+, VT-, HER2-) - Signed by Addis العلي DO on 12/10/2024     2/17/2025 -  Chemotherapy    CUSTOM Abraxane + CycloPHOSphamide, 21 Day Cycles           Subjective    HPI  Ligia Franklin is a 30 y.o. female presenting for follow up of breast cancer before her last cycle.     Her headaches have resolved and she notes she has gained some weight. She also has occasional chest pain, resolved with anxiety medication which she continues to take. She denies neuropathy, nail changes, bone pain, nausea, vomiting, diarrhea.      Patient's past medical history, surgical history, family history and social history reviewed.    Review of Systems:   Review of Systems:    Positive per HPI, otherwise negative.         Objective    BSA: 1.82 meters squared  /89 (BP Location: Left arm, Patient Position: Sitting)   Pulse 105   Temp 37 °C (98.6 °F) (Temporal)   Resp 16   Wt 72.6 kg (160 lb 0.9 oz)   SpO2 96%   BMI 26.86 kg/m²        Physical Exam  Gen: appears well in clinic, NAD  HEENT: atraumatic head, normocephalic, EOMI, conjunctiva normal  LUNG: no increased WOB, CTAB  CV: No JVD. RRR  GI: soft, NT, ND  LE: no LE edema  Skin: no obvious rashes or lesions on visible skin  Neuro: interactive, no focal deficits noted  Psych: normal mood and affect      Performance Status:  Symptomatic; fully ambulatory    Labs/Imaging/Pathology: Personally reviewed reports and images in Epic electronic medical record system. Pertinent results as it related to  the plan represented in below in assessment and plan.       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 It was ER+95%, SD+35%  - Given her young age of onset and unknown biological history as she was adopted, she was genetically tested and tested positive for the BRCA2 mutation  -  3/31/2023 Dr. Addis العلي performed bilateeral skin sparing mastectomy and right sentinel lymph node biopsy (0/3), DCIS spanning 5cm on path.Focal 1mm anterior margin. Left skin sparing mastectomy showed benign findings. Dr. Jim Scruggs plastics did immediate reconstruction with bilateral prepectoral direct saline implants  - Reviewed at tumor board and no re-excision recommended and no RT  - Given DCIS findings alone and s/p bilateral mastectomy tumor there was not indication for endocrine therapy  - She also met with gyn/onc Dr. Tabitha Trevino and ppx oopherectomy was recommended when she was done with child bearing.  - follow up 10/21/24 for annual follow with plastics Dr Scruggs and reported discomfort with implants moving around and felt they were too large expressing interest in revision reconstruction  -  11/12/24 she underwent bilateral revision reconstruction with implant exchange, and fat grafting. Unfortunately on pathology she was found to have IDC G3, measuring 5mm in greatest dimension involving fibrous and fibroadipose tissue with DCIS and margins could not be assessed  - reexcision on 12/20/24 and noted to have two foci of IDC with DCIS with a positive new inferior margin. Final tumor size 12 mm overall grade 2. pT1cNx  -Oncotype completed and found to have RS 31  - planned for reexcision on 1/22/24  - Today we discussed given oncotype score 31 and her  young age I would recommend adjuvant chemotherapy.  - We discussed Taxotere + Cytoxan given node negative and also discussed AC+T however due to her schedule and concern for longer term toxicity, they are very reluctant and reviewed there is not strong data that anthracycline based therapy is needed at this time  - Reviewed side effects and consent signed today.   - She is planned for revision surgery 1/22/25  - discussed importance of fertility preservation, Spring Ferrera contacted  - Will plan to start treatment on 2/17/25 if fertility preservation treatment has been completed  - fitted for cooling cap today  - RTC one week post treatment for toxicity check with Matthew and with me for C2.  - Referral sent to [facility] fertility placed today.     3/10/25:   - CMP pending, CBC at goal   - No contraindications to proceed with full dose today   - Given her nausea and headaches, we recommended avoiding dexamethasone unless necessary. Zyprexa will be added at bedtime.   - We did discuss continuing Claritin daily.   - Will plan for toxicity check  this Friday and possible fluids  - Given the headache we will hold the aloxi to see if that helps and will plan for Zofran instead.   - RTC in 3 weeks    4/3/25:  - Proceed with Cycle 3 Abraxane + Cytoxan every 21 days  - Lupron every 28 days - Due 4/7   - Neulasta with each cycle    4/18/25:   - Reviewed labs from today, CBC pending    - Overall she has tolerated treatment well without significant GI toxicity or no new neuropathy   - If CBC at goal, will continue as planned for final cycle next week at full dose   - We did discuss no role for RT   - We will plan to see her 3 months post her last cycle and start endocrine therapy at that time with aromatase inhibitor   - Will plan to continue Leupron every 4 weeks   - She is concerned about weight gain. We discussed different options including working with our dietician   - She did report her chest pain resolved with taking her  Wellbutryin   - Refill provided today for Wellbutryin 300 mg nightly   - Previously placed referral to cardiology   - RTC in 3 months after last cycle      Reviewed ongoing medical problems and how they relate to her malignancy, will continue long term monitoring.    RTC in 3 months  This note has been transcribed using a medical scribe and there is a possibility of unintentional typing misprints    Diagnoses and all orders for this visit:  Chemotherapy-induced alopecia  -     bimatoprost (Latisse) 0.03 % ophthalmic solution; Place 1 drop on applicator and apply along skin of upper eyelid at base of eyelashes daily at bedtime; rpt for 2nd eye with clean applicator  Malignant neoplasm of nipple of right breast in female, estrogen receptor positive  -     Clinic Appointment Request  -     bimatoprost (Latisse) 0.03 % ophthalmic solution; Place 1 drop on applicator and apply along skin of upper eyelid at base of eyelashes daily at bedtime; rpt for 2nd eye with clean applicator  -     CBC and Auto Differential; Future  -     Comprehensive Metabolic Panel; Future  -     Cancer Antigen 27-29; Future  -     Signatera; Other-indicate in comment; unknown - Miscellaneous Test; Future  -     reveal; Other-indicate in comment; unknown - Miscellaneous Test; Future  Generalized anxiety disorder  -     buPROPion XL (Wellbutrin XL) 300 mg 24 hr tablet; Take 1 tablet (300 mg) by mouth once daily at bedtime. Do not crush, chew, or split.  -     Clinic Appointment Request; Future  Other orders  -     ondansetron (Zofran) tablet 16 mg      Marion Garcia MD  Hematology/Oncology  [facility]      Mahamed Attestation  By signing my name below, IShandra Scribe, attest that this documentation has been prepared under the direction and in the presence of Marion Garcia MD.    Time Spent  Prep time on day of patient encounter: 5 minutes  Time spent directly with patient, family or caregiver: 30 minutes  Additional  Time Spent on Patient Care Activities: 5 minutes  Documentation Time: 5 minutes  Other Time Spent: 0 minutes  Total: 45 minutes

## 2025-04-18 ENCOUNTER — OFFICE VISIT (OUTPATIENT)
Dept: HEMATOLOGY/ONCOLOGY | Facility: CLINIC | Age: 30
End: 2025-04-18
Payer: COMMERCIAL

## 2025-04-18 ENCOUNTER — LAB (OUTPATIENT)
Dept: LAB | Facility: CLINIC | Age: 30
End: 2025-04-18
Payer: COMMERCIAL

## 2025-04-18 VITALS
TEMPERATURE: 98.6 F | WEIGHT: 160.05 LBS | DIASTOLIC BLOOD PRESSURE: 89 MMHG | OXYGEN SATURATION: 96 % | RESPIRATION RATE: 16 BRPM | SYSTOLIC BLOOD PRESSURE: 132 MMHG | BODY MASS INDEX: 26.86 KG/M2 | HEART RATE: 105 BPM

## 2025-04-18 DIAGNOSIS — Z17.0 MALIGNANT NEOPLASM OF NIPPLE OF RIGHT BREAST IN FEMALE, ESTROGEN RECEPTOR POSITIVE: ICD-10-CM

## 2025-04-18 DIAGNOSIS — C50.011 MALIGNANT NEOPLASM OF NIPPLE OF RIGHT BREAST IN FEMALE, ESTROGEN RECEPTOR POSITIVE: ICD-10-CM

## 2025-04-18 DIAGNOSIS — F41.1 GENERALIZED ANXIETY DISORDER: ICD-10-CM

## 2025-04-18 DIAGNOSIS — T45.1X5A CHEMOTHERAPY-INDUCED ALOPECIA: Primary | ICD-10-CM

## 2025-04-18 DIAGNOSIS — L65.8 CHEMOTHERAPY-INDUCED ALOPECIA: Primary | ICD-10-CM

## 2025-04-18 LAB
ALBUMIN SERPL BCP-MCNC: 4.4 G/DL (ref 3.4–5)
ALP SERPL-CCNC: 96 U/L (ref 33–110)
ALT SERPL W P-5'-P-CCNC: 16 U/L (ref 7–45)
ANION GAP SERPL CALC-SCNC: 12 MMOL/L (ref 10–20)
AST SERPL W P-5'-P-CCNC: 11 U/L (ref 9–39)
BASOPHILS # BLD AUTO: 0.04 X10*3/UL (ref 0–0.1)
BASOPHILS NFR BLD AUTO: 0.6 %
BILIRUB SERPL-MCNC: 0.3 MG/DL (ref 0–1.2)
BUN SERPL-MCNC: 16 MG/DL (ref 6–23)
CALCIUM SERPL-MCNC: 9.1 MG/DL (ref 8.6–10.3)
CHLORIDE SERPL-SCNC: 105 MMOL/L (ref 98–107)
CO2 SERPL-SCNC: 27 MMOL/L (ref 21–32)
CREAT SERPL-MCNC: 0.88 MG/DL (ref 0.5–1.05)
EGFRCR SERPLBLD CKD-EPI 2021: >90 ML/MIN/1.73M*2
EOSINOPHIL # BLD AUTO: 0.11 X10*3/UL (ref 0–0.7)
EOSINOPHIL NFR BLD AUTO: 1.6 %
ERYTHROCYTE [DISTWIDTH] IN BLOOD BY AUTOMATED COUNT: 14.7 % (ref 11.5–14.5)
GLUCOSE SERPL-MCNC: 88 MG/DL (ref 74–99)
HCT VFR BLD AUTO: 34 % (ref 36–46)
HGB BLD-MCNC: 11 G/DL (ref 12–16)
HOLD SPECIMEN: NORMAL
IMM GRANULOCYTES # BLD AUTO: 0.04 X10*3/UL (ref 0–0.7)
IMM GRANULOCYTES NFR BLD AUTO: 0.6 % (ref 0–0.9)
LYMPHOCYTES # BLD AUTO: 1.55 X10*3/UL (ref 1.2–4.8)
LYMPHOCYTES NFR BLD AUTO: 23 %
MCH RBC QN AUTO: 30.7 PG (ref 26–34)
MCHC RBC AUTO-ENTMCNC: 32.4 G/DL (ref 32–36)
MCV RBC AUTO: 95 FL (ref 80–100)
MONOCYTES # BLD AUTO: 0.4 X10*3/UL (ref 0.1–1)
MONOCYTES NFR BLD AUTO: 5.9 %
NEUTROPHILS # BLD AUTO: 4.59 X10*3/UL (ref 1.2–7.7)
NEUTROPHILS NFR BLD AUTO: 68.3 %
NRBC BLD-RTO: ABNORMAL /100{WBCS}
PLATELET # BLD AUTO: 157 X10*3/UL (ref 150–450)
POLYCHROMASIA BLD QL SMEAR: NORMAL
POTASSIUM SERPL-SCNC: 3.8 MMOL/L (ref 3.5–5.3)
PROT SERPL-MCNC: 6.7 G/DL (ref 6.4–8.2)
RBC # BLD AUTO: 3.58 X10*6/UL (ref 4–5.2)
RBC MORPH BLD: NORMAL
SODIUM SERPL-SCNC: 140 MMOL/L (ref 136–145)
WBC # BLD AUTO: 6.7 X10*3/UL (ref 4.4–11.3)

## 2025-04-18 PROCEDURE — 99215 OFFICE O/P EST HI 40 MIN: CPT | Performed by: INTERNAL MEDICINE

## 2025-04-18 PROCEDURE — 84075 ASSAY ALKALINE PHOSPHATASE: CPT

## 2025-04-18 PROCEDURE — 85025 COMPLETE CBC W/AUTO DIFF WBC: CPT

## 2025-04-18 PROCEDURE — G2211 COMPLEX E/M VISIT ADD ON: HCPCS | Performed by: INTERNAL MEDICINE

## 2025-04-18 PROCEDURE — 36415 COLL VENOUS BLD VENIPUNCTURE: CPT

## 2025-04-18 RX ORDER — BIMATOPROST 3 UG/ML
SOLUTION TOPICAL
Qty: 5 ML | Refills: 3 | Status: SHIPPED | OUTPATIENT
Start: 2025-04-18 | End: 2026-04-18

## 2025-04-18 RX ORDER — BUPROPION HYDROCHLORIDE 300 MG/1
300 TABLET ORAL NIGHTLY
Qty: 90 TABLET | Refills: 4 | Status: SHIPPED | OUTPATIENT
Start: 2025-04-18 | End: 2026-04-18

## 2025-04-18 RX ORDER — ONDANSETRON HYDROCHLORIDE 8 MG/1
16 TABLET, FILM COATED ORAL ONCE
OUTPATIENT
Start: 2025-04-24 | End: 2025-04-24

## 2025-04-18 ASSESSMENT — PAIN SCALES - GENERAL: PAINLEVEL_OUTOF10: 0-NO PAIN

## 2025-04-19 ENCOUNTER — APPOINTMENT (OUTPATIENT)
Dept: RADIOLOGY | Facility: HOSPITAL | Age: 30
End: 2025-04-19
Payer: COMMERCIAL

## 2025-04-19 ENCOUNTER — APPOINTMENT (OUTPATIENT)
Dept: CARDIOLOGY | Facility: HOSPITAL | Age: 30
End: 2025-04-19
Payer: COMMERCIAL

## 2025-04-19 ENCOUNTER — HOSPITAL ENCOUNTER (EMERGENCY)
Facility: HOSPITAL | Age: 30
Discharge: HOME | End: 2025-04-19
Attending: STUDENT IN AN ORGANIZED HEALTH CARE EDUCATION/TRAINING PROGRAM
Payer: COMMERCIAL

## 2025-04-19 VITALS
HEART RATE: 102 BPM | TEMPERATURE: 98.6 F | RESPIRATION RATE: 20 BRPM | HEIGHT: 64 IN | OXYGEN SATURATION: 97 % | SYSTOLIC BLOOD PRESSURE: 121 MMHG | WEIGHT: 160 LBS | DIASTOLIC BLOOD PRESSURE: 67 MMHG | BODY MASS INDEX: 27.31 KG/M2

## 2025-04-19 DIAGNOSIS — R07.9 CHEST PAIN, UNSPECIFIED TYPE: Primary | ICD-10-CM

## 2025-04-19 DIAGNOSIS — F41.9 ANXIETY: Primary | ICD-10-CM

## 2025-04-19 LAB
ALBUMIN SERPL BCP-MCNC: 4.5 G/DL (ref 3.4–5)
ALP SERPL-CCNC: 94 U/L (ref 33–110)
ALT SERPL W P-5'-P-CCNC: 17 U/L (ref 7–45)
ANION GAP SERPL CALC-SCNC: 14 MMOL/L (ref 10–20)
AST SERPL W P-5'-P-CCNC: 18 U/L (ref 9–39)
B-HCG SERPL-ACNC: <2 MIU/ML
BASOPHILS # BLD AUTO: 0.06 X10*3/UL (ref 0–0.1)
BASOPHILS NFR BLD AUTO: 0.7 %
BILIRUB SERPL-MCNC: 0.3 MG/DL (ref 0–1.2)
BUN SERPL-MCNC: 16 MG/DL (ref 6–23)
CALCIUM SERPL-MCNC: 9.4 MG/DL (ref 8.6–10.3)
CARDIAC TROPONIN I PNL SERPL HS: <3 NG/L (ref 0–13)
CARDIAC TROPONIN I PNL SERPL HS: <3 NG/L (ref 0–13)
CHLORIDE SERPL-SCNC: 105 MMOL/L (ref 98–107)
CO2 SERPL-SCNC: 26 MMOL/L (ref 21–32)
CREAT SERPL-MCNC: 0.99 MG/DL (ref 0.5–1.05)
D DIMER PPP FEU-MCNC: 381 NG/ML FEU
EGFRCR SERPLBLD CKD-EPI 2021: 79 ML/MIN/1.73M*2
EOSINOPHIL # BLD AUTO: 0.1 X10*3/UL (ref 0–0.7)
EOSINOPHIL NFR BLD AUTO: 1.2 %
ERYTHROCYTE [DISTWIDTH] IN BLOOD BY AUTOMATED COUNT: 14.6 % (ref 11.5–14.5)
FLUAV RNA RESP QL NAA+PROBE: NOT DETECTED
FLUBV RNA RESP QL NAA+PROBE: NOT DETECTED
GLUCOSE SERPL-MCNC: 98 MG/DL (ref 74–99)
HCT VFR BLD AUTO: 33.6 % (ref 36–46)
HGB BLD-MCNC: 11.1 G/DL (ref 12–16)
HOLD SPECIMEN: NORMAL
IMM GRANULOCYTES # BLD AUTO: 0.05 X10*3/UL (ref 0–0.7)
IMM GRANULOCYTES NFR BLD AUTO: 0.6 % (ref 0–0.9)
LYMPHOCYTES # BLD AUTO: 1.5 X10*3/UL (ref 1.2–4.8)
LYMPHOCYTES NFR BLD AUTO: 18.3 %
MAGNESIUM SERPL-MCNC: 2.15 MG/DL (ref 1.6–2.4)
MCH RBC QN AUTO: 30.9 PG (ref 26–34)
MCHC RBC AUTO-ENTMCNC: 33 G/DL (ref 32–36)
MCV RBC AUTO: 94 FL (ref 80–100)
MONOCYTES # BLD AUTO: 0.52 X10*3/UL (ref 0.1–1)
MONOCYTES NFR BLD AUTO: 6.4 %
NEUTROPHILS # BLD AUTO: 5.95 X10*3/UL (ref 1.2–7.7)
NEUTROPHILS NFR BLD AUTO: 72.8 %
NRBC BLD-RTO: 0 /100 WBCS (ref 0–0)
PLATELET # BLD AUTO: 194 X10*3/UL (ref 150–450)
POTASSIUM SERPL-SCNC: 4.4 MMOL/L (ref 3.5–5.3)
PROT SERPL-MCNC: 7.2 G/DL (ref 6.4–8.2)
RBC # BLD AUTO: 3.59 X10*6/UL (ref 4–5.2)
SARS-COV-2 RNA RESP QL NAA+PROBE: NOT DETECTED
SODIUM SERPL-SCNC: 141 MMOL/L (ref 136–145)
WBC # BLD AUTO: 8.2 X10*3/UL (ref 4.4–11.3)

## 2025-04-19 PROCEDURE — 85025 COMPLETE CBC W/AUTO DIFF WBC: CPT

## 2025-04-19 PROCEDURE — 84484 ASSAY OF TROPONIN QUANT: CPT

## 2025-04-19 PROCEDURE — 87636 SARSCOV2 & INF A&B AMP PRB: CPT

## 2025-04-19 PROCEDURE — 71045 X-RAY EXAM CHEST 1 VIEW: CPT

## 2025-04-19 PROCEDURE — 85379 FIBRIN DEGRADATION QUANT: CPT

## 2025-04-19 PROCEDURE — 36415 COLL VENOUS BLD VENIPUNCTURE: CPT

## 2025-04-19 PROCEDURE — 83735 ASSAY OF MAGNESIUM: CPT

## 2025-04-19 PROCEDURE — 93005 ELECTROCARDIOGRAM TRACING: CPT

## 2025-04-19 PROCEDURE — 99285 EMERGENCY DEPT VISIT HI MDM: CPT | Performed by: STUDENT IN AN ORGANIZED HEALTH CARE EDUCATION/TRAINING PROGRAM

## 2025-04-19 PROCEDURE — 80053 COMPREHEN METABOLIC PANEL: CPT

## 2025-04-19 PROCEDURE — 71045 X-RAY EXAM CHEST 1 VIEW: CPT | Performed by: RADIOLOGY

## 2025-04-19 PROCEDURE — 84702 CHORIONIC GONADOTROPIN TEST: CPT

## 2025-04-19 RX ORDER — DIAZEPAM 5 MG/1
5 TABLET ORAL EVERY 12 HOURS PRN
Qty: 30 TABLET | Refills: 1 | Status: SHIPPED | OUTPATIENT
Start: 2025-04-19 | End: 2025-05-19

## 2025-04-19 ASSESSMENT — LIFESTYLE VARIABLES
EVER HAD A DRINK FIRST THING IN THE MORNING TO STEADY YOUR NERVES TO GET RID OF A HANGOVER: NO
TOTAL SCORE: 0
HAVE YOU EVER FELT YOU SHOULD CUT DOWN ON YOUR DRINKING: NO
EVER FELT BAD OR GUILTY ABOUT YOUR DRINKING: NO
HAVE PEOPLE ANNOYED YOU BY CRITICIZING YOUR DRINKING: NO

## 2025-04-19 ASSESSMENT — COLUMBIA-SUICIDE SEVERITY RATING SCALE - C-SSRS
1. IN THE PAST MONTH, HAVE YOU WISHED YOU WERE DEAD OR WISHED YOU COULD GO TO SLEEP AND NOT WAKE UP?: NO
2. HAVE YOU ACTUALLY HAD ANY THOUGHTS OF KILLING YOURSELF?: NO
6. HAVE YOU EVER DONE ANYTHING, STARTED TO DO ANYTHING, OR PREPARED TO DO ANYTHING TO END YOUR LIFE?: NO

## 2025-04-19 ASSESSMENT — PAIN DESCRIPTION - LOCATION: LOCATION: CHEST

## 2025-04-19 ASSESSMENT — PAIN - FUNCTIONAL ASSESSMENT: PAIN_FUNCTIONAL_ASSESSMENT: 0-10

## 2025-04-19 ASSESSMENT — PAIN DESCRIPTION - ORIENTATION: ORIENTATION: LEFT

## 2025-04-19 ASSESSMENT — PAIN SCALES - GENERAL
PAINLEVEL_OUTOF10: 4
PAINLEVEL_OUTOF10: 4

## 2025-04-19 ASSESSMENT — PAIN DESCRIPTION - DESCRIPTORS: DESCRIPTORS: DISCOMFORT

## 2025-04-19 NOTE — DISCHARGE INSTRUCTIONS
You were evaluated in the Emergency Department today for chest pain. Your work-up today did not reveal any acute, emergent findings requiring intervention that would be a cause of your symptoms. We do recommend strongly that you follow up with your primary care provider as soon as you are able to for further evaluation as outpatient.     If you have a return or worsening of symptoms including worsening chest pain, shortness of breath, lightheadedness, dizziness, sweating, chest pain at rest, worsening exercise tolerance, or any new or concerning symptoms please seek immediate medical attention or come back to the ER.

## 2025-04-19 NOTE — ED PROVIDER NOTES
EMERGENCY DEPARTMENT ENCOUNTER      Pt Name: Ligia Franklin  MRN: [id number]  Birthdate 1995  Date of evaluation: 4/19/2025  Provider: Franck Evans DO    CHIEF COMPLAINT       Chief Complaint   Patient presents with    Chest Pain     Had anxiety with CP 1 week ago, felt better with anxiety meds. Pain returned intermittently since yesterday         HISTORY OF PRESENT ILLNESS    30-year-old female presenting today with a chief complaint of chest pain.  Intermittent in nature, has been present for the past week or so.  Lasts around 5 to 10 seconds at a time.  No specific pattern that she has noticed.  No aggravating or alleviating factors.  It is not worse with deep inspiration.  She denies any significant shortness of breath, she does note that she has been somewhat short of breath since starting chemotherapy.  She follows with Dr. Garcia in oncology.  She has a history of breast cancer.  No nausea vomiting.  No fevers or chills.  No leg swelling.  No abdominal pain.  No back pain.          Nursing Notes were reviewed.    PAST MEDICAL HISTORY   Medical History[1]      SURGICAL HISTORY     Surgical History[2]      CURRENT MEDICATIONS       Previous Medications    BIMATOPROST (LATISSE) 0.03 % OPHTHALMIC SOLUTION    Place 1 drop on applicator and apply along skin of upper eyelid at base of eyelashes daily at bedtime; rpt for 2nd eye with clean applicator    BUPROPION XL (WELLBUTRIN XL) 300 MG 24 HR TABLET    Take 1 tablet (300 mg) by mouth once daily at bedtime. Do not crush, chew, or split.    IBUPROFEN 800 MG TABLET    Take 1 tablet (800 mg) by mouth every 6 hours if needed for headaches.    OLANZAPINE (ZYPREXA) 2.5 MG TABLET    Take 1 tablet (2.5 mg) by mouth once daily at bedtime. Start night of chemo    ONDANSETRON (ZOFRAN) 8 MG TABLET    Take 1 tablet (8 mg) by mouth every 8 hours if needed for nausea or vomiting.    ONDANSETRON ODT (ZOFRAN-ODT) 4 MG DISINTEGRATING TABLET    Dissolve 1 tablet (4 mg) in the mouth  every 8 hours if needed for nausea or vomiting.    PROCHLORPERAZINE (COMPAZINE) 10 MG TABLET    Take 1 tablet (10 mg) by mouth every 6 hours if needed for nausea or vomiting.    SCOPOLAMINE (TRANSDERM-SCOP) 1 MG OVER 3 DAYS PATCH 3 DAY    Place 1 patch over 72 hours on the skin every 3rd day.       ALLERGIES     Docetaxel    FAMILY HISTORY     Family History[3]       SOCIAL HISTORY     Social History[4]    SCREENINGS                        PHYSICAL EXAM    (up to 7 for level 4, 8 or more for level 5)     ED Triage Vitals [04/19/25 1220]   Temperature Heart Rate Respirations BP   37 °C (98.6 °F) 83 18 135/77      Pulse Ox Temp Source Heart Rate Source Patient Position   100 % Temporal Monitor Sitting      BP Location FiO2 (%)     Right arm --       Physical Exam  Vitals and nursing note reviewed.   Constitutional:       General: She is not in acute distress.     Appearance: Normal appearance. She is not ill-appearing or toxic-appearing.   HENT:      Head: Normocephalic and atraumatic.      Right Ear: External ear normal.      Left Ear: External ear normal.      Nose: Nose normal.   Eyes:      General:         Right eye: No discharge.         Left eye: No discharge.      Extraocular Movements: Extraocular movements intact.      Conjunctiva/sclera: Conjunctivae normal.      Pupils: Pupils are equal, round, and reactive to light.   Cardiovascular:      Rate and Rhythm: Normal rate and regular rhythm.      Pulses: Normal pulses.      Heart sounds: Normal heart sounds.   Pulmonary:      Effort: Pulmonary effort is normal.      Breath sounds: Normal breath sounds. No decreased breath sounds or wheezing.   Abdominal:      General: There is no distension.      Palpations: Abdomen is soft. There is no mass.      Tenderness: There is no abdominal tenderness. There is no guarding.   Musculoskeletal:         General: No deformity or signs of injury.   Skin:     General: Skin is warm and dry.   Neurological:      General: No  focal deficit present.      Mental Status: She is alert and oriented to person, place, and time. Mental status is at baseline.   Psychiatric:         Mood and Affect: Mood normal.         Behavior: Behavior normal.          DIAGNOSTIC RESULTS     LABS:  Labs Reviewed   CBC WITH AUTO DIFFERENTIAL - Abnormal       Result Value    WBC 8.2      nRBC 0.0      RBC 3.59 (*)     Hemoglobin 11.1 (*)     Hematocrit 33.6 (*)     MCV 94      MCH 30.9      MCHC 33.0      RDW 14.6 (*)     Platelets 194      Neutrophils % 72.8      Immature Granulocytes %, Automated 0.6      Lymphocytes % 18.3      Monocytes % 6.4      Eosinophils % 1.2      Basophils % 0.7      Neutrophils Absolute 5.95      Immature Granulocytes Absolute, Automated 0.05      Lymphocytes Absolute 1.50      Monocytes Absolute 0.52      Eosinophils Absolute 0.10      Basophils Absolute 0.06     COMPREHENSIVE METABOLIC PANEL - Normal    Glucose 98      Sodium 141      Potassium 4.4      Chloride 105      Bicarbonate 26      Anion Gap 14      Urea Nitrogen 16      Creatinine 0.99      eGFR 79      Calcium 9.4      Albumin 4.5      Alkaline Phosphatase 94      Total Protein 7.2      AST 18      Bilirubin, Total 0.3      ALT 17     MAGNESIUM - Normal    Magnesium 2.15     SARS-COV-2 AND INFLUENZA A/B PCR - Normal    Flu A Result Not Detected      Flu B Result Not Detected      Coronavirus 2019, PCR Not Detected      Narrative:     This assay is an FDA-cleared, in vitro diagnostic nucleic acid amplification test for the qualitative detection and differentiation of SARS CoV-2/ Influenza A/B from nasopharyngeal specimens collected from individuals with signs and symptoms of respiratory tract infections, and has been validated for use at [facility]. Negative results do not preclude COVID-19/ Influenza A/B infections and should not be used as the sole basis for diagnosis, treatment, or other management decisions. Testing for SARS CoV-2 is recommended  only for patients who meet current clinical and/or epidemiological criteria defined by federal, state, or local public health directives.   HUMAN CHORIONIC GONADOTROPIN, SERUM QUANTITATIVE - Normal    HCG, Beta-Quantitative <2      Narrative:      Total HCG measurement is performed using the [employer] Access   Immunoassay which detects intact HCG and free beta HCG subunit.    This test is not indicated for use as a tumor marker.   HCG testing is performed using a different test methodology at [facility] than other [facility]. Direct result comparison   should only be made within the same method.       D-DIMER, VTE EXCLUSION - Normal    D-Dimer, Quantitative VTE Exclusion 381      Narrative:     The VTE Exclusion D-Dimer assay is reported in ng/mL Fibrinogen Equivalent Units (FEU).    Per 's instructions for use, a value of less than 500 ng/mL (FEU) may help to exclude DVT or PE in outpatients when the assay is used with a clinical pretest probability assessment.([facility] must utilize and document eCalc 'Wells Score Deep Vein Thrombosis Risk' for DVT exclusion only. Emergency Department should utilize  Guidelines for Emergency Department Use of the VTE Exclusion D-Dimer and Clinical Pretest probability assessment model for DVT or PE exclusion.)   SERIAL TROPONIN-INITIAL - Normal    Troponin I, High Sensitivity <3      Narrative:     Less than 99th percentile of normal range cutoff-  Female and children under 18 years old <14 ng/L; Male <21 ng/L: Negative  Repeat testing should be performed if clinically indicated.     Female and children under 18 years old 14-50 ng/L; Male 21-50 ng/L:  Consistent with possible cardiac damage and possible increased clinical   risk. Serial measurements may help to assess extent of myocardial damage.     >50 ng/L: Consistent with cardiac damage, increased clinical risk and  myocardial infarction. Serial measurements may help assess extent of    myocardial damage.      NOTE: Children less than 1 year old may have higher baseline troponin   levels and results should be interpreted in conjunction with the overall   clinical context.     NOTE: Troponin I testing is performed using a different   testing methodology at [facility] than at other   [facility]. Direct result comparisons should only   be made within the same method.   SERIAL TROPONIN, 1 HOUR - Normal    Troponin I, High Sensitivity <3      Narrative:     Less than 99th percentile of normal range cutoff-  Female and children under 18 years old <14 ng/L; Male <21 ng/L: Negative  Repeat testing should be performed if clinically indicated.     Female and children under 18 years old 14-50 ng/L; Male 21-50 ng/L:  Consistent with possible cardiac damage and possible increased clinical   risk. Serial measurements may help to assess extent of myocardial damage.     >50 ng/L: Consistent with cardiac damage, increased clinical risk and  myocardial infarction. Serial measurements may help assess extent of   myocardial damage.      NOTE: Children less than 1 year old may have higher baseline troponin   levels and results should be interpreted in conjunction with the overall   clinical context.     NOTE: Troponin I testing is performed using a different   testing methodology at [facility] than at other   [facility]. Direct result comparisons should only   be made within the same method.   TROPONIN SERIES- (INITIAL, 1 HR)    Narrative:     The following orders were created for panel order Troponin I Series, High Sensitivity (0, 1 HR).  Procedure                               Abnormality         Status                     ---------                               -----------         ------                     Troponin I, High Sensiti...[[id number]]  Normal              Final result               Troponin, High Sensitivi...[[id number]]  Normal              Final result                  Please view results for these tests on the individual orders.       All other labs were within normal range or not returned as of this dictation.    Imaging  XR chest 1 view   Final Result   No acute cardiopulmonary process.        MACRO:   None        Signed by: Sabrina Brandt 4/19/2025 1:08 PM   Dictation workstation:   [id number]           Procedures  Procedures     EMERGENCY DEPARTMENT COURSE/MDM:   Medical Decision Making  30-year-old female with past medical history significant for breast cancer on chemotherapy presenting today with a chief complaint of chest pain.  Intermittent nature, atypical chest pain per history.  No significant cardiac risk factors.  Differential includes but is not limited to PE, ACS, pneumonia, costochondritis, muscle strain. We will proceed with usual chest pain work-up including EKG, chest x-ray, CBC, CMP, serial troponin, BNP to rule out acute coronary syndrome.  D-dimer ordered to rule out PE.        Please see ED course for additional MDM.    ED Course as of 04/19/25 1443   Sat Apr 19, 2025   1231 EKG performed at 12: 26 and independently reviewed by provider: Reveals NSR with a rate of 89 bpm, normal axis, normal intervals, no ST changes, no T wave abnormalities, no ectopy. No STEMI.  Sinus arrhythmia with possible left atrial enlargement appreciated. [TL]   1258 Chest Radiograph interpretation: No acute cardiopulmonary process.  No pneumothorax, widened mediastinum, pneumonia. [TL]   1359 EKG was without ischemic changes.  Lab work was unremarkable with normal/adequately downtrending troponin.  Chest x-ray without evidence of pneumothorax, pneumonia, pneumomediastinum, or other abnormalities that would require admission.  Very low concern for ACS at this time as a cause of the patient's symptoms.  Admission was considered, but not indicated given a heart score less than 4 and low clinical suspicion for ACS.  Patient was given information for follow-up with their primary  care provider.  They were in agreement with plan for discharge home.  Return precautions were discussed. [CL]   1428 Repeat EKG nonischemic. [TL]   1428 HEART Score:    History:   [ x ] Slightly suspicious - 0   [  ] Moderately suspicious - 1  [  ] Highly suspicious - 2     EKG:   [ x ] Normal - 0    [  ] Non-specific repolarization disturbance (No ST changes, but LBBB, LVH, repolarization changes)  - 1   [  ] Significant ST deviation (ST changes not d/t LBBB, LVH, digoxin)  - 2     Age:   [ x ] < 45 - 0   [  ] 45-64 - 1   [  ] > 65 - 2     Risk Factors:     HTN, HLD, DM, obesity (BMI > 30), smoking (current or w/I 3mo), + fmx (before age of 65), CAD, prior MI, PCI/CABG, CVA/TIA, PAD  [  ] No known risk factors - 0   [ x ] 1-2 risk factors - 1    [  ] >3 risk factors or hx of atherosclerotic disease - 2     Initial troponin:  [ x ] < normal limit - 0   [  ] 1 - 3x normal limit - 1   [  ] > 3x normal limit - 2    Total:   [ x] 0-3 Points: 1.7% risk of major adverse cardiac event in 6 weeks  [ ] 4-6 Points: 12-16.6% risk of major adverse cardiac event in 6 weeks  [ ] 7-10 Points 50-65% risk of major adverse cardiac event in 6 weeks    I did discuss the heart score results with the patient.  We did discuss the risk stratification. I did discuss that the patient's risk based on the above scoring and their risk of coronary artery disease. The patient is comfortable being discharged home and following up with the appropriate physicians. This is shared decision making. They're to return to the nearest emergency room for any new or worsening symptoms. [TL]      ED Course User Index  [CL] Franck Evans DO  [TL] Evgeny Arzola DO         Diagnoses as of 04/19/25 1443   Chest pain, unspecified type        XR chest 1 view   Final Result   No acute cardiopulmonary process.        MACRO:   None        Signed by: Sabrina Brandt 4/19/2025 1:08 PM   Dictation workstation:   [id number]          Patient and or family in agreement  and understanding of treatment plan.  All questions answered.      I reviewed the case with the attending ED physician. The attending ED physician agrees with the plan. Patient and/or patient´s representative was counseled regarding labs, imaging, likely diagnosis, and plan. All questions were answered.    ED Medications administered this visit:  Medications - No data to display    New Prescriptions from this visit:    New Prescriptions    No medications on file       Follow-up:  Rosa Maria Bruno, APRN-CNP  [id number] [place] [id number] [phone]    Schedule an appointment as soon as possible for a visit           Final Impression:   1. Chest pain, unspecified type          (Please note that portions of this note were completed with a voice recognition program.  Efforts were made to edit the dictations but occasionally words are mis-transcribed.)       [1]   Past Medical History:  Diagnosis Date    Abnormal glucose complicating pregnancy ([facility]) 03/21/2022    Abnormal glucose tolerance in pregnancy    Breast cancer ([facility]) January    Delayed emergence from general anesthesia March    Encounter for screening for diabetes mellitus 07/02/2021    Encounter for screening for diabetes mellitus    Encounter for supervision of normal pregnancy, unspecified, unspecified trimester 03/21/2022    Prenatal care    Nonpurulent mastitis associated with the puerperium ([facility]) 03/21/2022    Mastitis, postpartum    PONV (postoperative nausea and vomiting) March    Unspecified disorders of lactation ([facility]) 03/21/2022    Lactation problem   [2]   Past Surgical History:  Procedure Laterality Date    BREAST BIOPSY  March    MASTECTOMY Bilateral March    MASTECTOMY, PARTIAL Right 12/20/2024    RIGHT MAG SEED BRACKETED PARTIAL MASTECTOMY (Right)      Breast Mastectomy w/Immediate Reconstruction Uni (Right)   [3]   Family History  Problem Relation Name Age of Onset    Other (RIGHT VENTRICULAR DYSPLASIA) Mother       Cancer Father Dont know     Breast cancer Maternal Grandmother     [4]   Social History  Socioeconomic History    Marital status:    Tobacco Use    Smoking status: Never    Smokeless tobacco: Never   Vaping Use    Vaping status: Never Used   Substance and Sexual Activity    Alcohol use: Never    Drug use: Never    Sexual activity: Yes     Partners: Male     Birth control/protection: I.U.D.        Franck Evans,   Resident  04/19/25 1445

## 2025-04-20 LAB
ATRIAL RATE: 89 BPM
ATRIAL RATE: 91 BPM
P AXIS: 60 DEGREES
P AXIS: 62 DEGREES
P OFFSET: 187 MS
P OFFSET: 193 MS
P ONSET: 141 MS
P ONSET: 155 MS
PR INTERVAL: 134 MS
PR INTERVAL: 136 MS
Q ONSET: 208 MS
Q ONSET: 223 MS
QRS COUNT: 15 BEATS
QRS COUNT: 15 BEATS
QRS DURATION: 82 MS
QRS DURATION: 86 MS
QT INTERVAL: 372 MS
QT INTERVAL: 376 MS
QTC CALCULATION(BAZETT): 452 MS
QTC CALCULATION(BAZETT): 462 MS
QTC FREDERICIA: 424 MS
QTC FREDERICIA: 432 MS
R AXIS: 26 DEGREES
R AXIS: 32 DEGREES
T AXIS: 40 DEGREES
T AXIS: 46 DEGREES
T OFFSET: 394 MS
T OFFSET: 411 MS
VENTRICULAR RATE: 89 BPM
VENTRICULAR RATE: 91 BPM

## 2025-04-22 ENCOUNTER — TELEPHONE (OUTPATIENT)
Dept: HEMATOLOGY/ONCOLOGY | Facility: CLINIC | Age: 30
End: 2025-04-22
Payer: COMMERCIAL

## 2025-04-22 NOTE — TELEPHONE ENCOUNTER
Reason for Conversation  Patient still has chest pain, thinking it is anxiety related.  Was told valium was called into pharmacy but it is not there. Can we give her something?    Background       Disposition   No disposition on file.

## 2025-04-22 NOTE — TELEPHONE ENCOUNTER
"Reason for Conversation  Chest Pain    Background   Spoke with the patient. States Dr. Garcia knows patient is having intermittent CP- and feels it was anxiety related. States CP happened again Saturday and patient went to the ED- \"all was OK.\" Pt asking if her Valium was called in after talking with Dr. Bender- on call on Saturday. Provided update that Valium was sent in on 4.19. Pt then reviewed the medications that she recently picked up and realized she did  the medication. Discussed how she can take the medication with Verbal understanding. Pt had no further questions or concerns at this time,     Disposition   No disposition on file.    "

## 2025-04-23 ENCOUNTER — TELEPHONE (OUTPATIENT)
Dept: HEMATOLOGY/ONCOLOGY | Facility: CLINIC | Age: 30
End: 2025-04-23
Payer: COMMERCIAL

## 2025-04-23 NOTE — TELEPHONE ENCOUNTER
Reason for Conversation  Upcoming appts.     Background   I left a detailed message for Ligia, notifying her that the team was updated regarding possibly scheduling her for a provider visit post ER visit this past weekend (4/19/25). She was made aware that the team did schedule her to see Shara VARMA NP tomorrow (4/24) at 9:30 am (Shraa approved) with her tx to follow (tx already scheduled at 9:30 am). Call back information was left and I did encourage her to please call the office if she has any questions or concerns.      Disposition   No disposition on file.

## 2025-04-24 ENCOUNTER — INFUSION (OUTPATIENT)
Dept: HEMATOLOGY/ONCOLOGY | Facility: CLINIC | Age: 30
End: 2025-04-24
Payer: COMMERCIAL

## 2025-04-24 ENCOUNTER — OFFICE VISIT (OUTPATIENT)
Dept: HEMATOLOGY/ONCOLOGY | Facility: CLINIC | Age: 30
End: 2025-04-24
Payer: COMMERCIAL

## 2025-04-24 VITALS
HEART RATE: 109 BPM | DIASTOLIC BLOOD PRESSURE: 81 MMHG | BODY MASS INDEX: 27.25 KG/M2 | TEMPERATURE: 98.6 F | OXYGEN SATURATION: 97 % | WEIGHT: 158.73 LBS | SYSTOLIC BLOOD PRESSURE: 133 MMHG | RESPIRATION RATE: 17 BRPM

## 2025-04-24 VITALS — BODY MASS INDEX: 26.64 KG/M2 | HEIGHT: 65 IN

## 2025-04-24 DIAGNOSIS — D05.11 DUCTAL CARCINOMA IN SITU (DCIS) OF RIGHT BREAST: ICD-10-CM

## 2025-04-24 DIAGNOSIS — Z17.0 MALIGNANT NEOPLASM OF NIPPLE OF RIGHT BREAST IN FEMALE, ESTROGEN RECEPTOR POSITIVE: ICD-10-CM

## 2025-04-24 DIAGNOSIS — C50.011 MALIGNANT NEOPLASM OF NIPPLE OF RIGHT BREAST IN FEMALE, ESTROGEN RECEPTOR POSITIVE: ICD-10-CM

## 2025-04-24 DIAGNOSIS — F41.9 ANXIETY: Primary | ICD-10-CM

## 2025-04-24 DIAGNOSIS — F41.1 GENERALIZED ANXIETY DISORDER: ICD-10-CM

## 2025-04-24 PROCEDURE — 99214 OFFICE O/P EST MOD 30 MIN: CPT

## 2025-04-24 PROCEDURE — 2500000001 HC RX 250 WO HCPCS SELF ADMINISTERED DRUGS (ALT 637 FOR MEDICARE OP): Performed by: INTERNAL MEDICINE

## 2025-04-24 PROCEDURE — 96417 CHEMO IV INFUS EACH ADDL SEQ: CPT

## 2025-04-24 PROCEDURE — 2500000004 HC RX 250 GENERAL PHARMACY W/ HCPCS (ALT 636 FOR OP/ED): Mod: TB | Performed by: INTERNAL MEDICINE

## 2025-04-24 PROCEDURE — 96413 CHEMO IV INFUSION 1 HR: CPT

## 2025-04-24 PROCEDURE — 2500000004 HC RX 250 GENERAL PHARMACY W/ HCPCS (ALT 636 FOR OP/ED): Performed by: INTERNAL MEDICINE

## 2025-04-24 RX ORDER — DIPHENHYDRAMINE HCL 25 MG
25 CAPSULE ORAL ONCE
Status: COMPLETED | OUTPATIENT
Start: 2025-04-24 | End: 2025-04-24

## 2025-04-24 RX ORDER — HEPARIN SODIUM,PORCINE/PF 10 UNIT/ML
50 SYRINGE (ML) INTRAVENOUS AS NEEDED
Status: DISCONTINUED | OUTPATIENT
Start: 2025-04-24 | End: 2025-04-24 | Stop reason: HOSPADM

## 2025-04-24 RX ORDER — ONDANSETRON HYDROCHLORIDE 8 MG/1
16 TABLET, FILM COATED ORAL ONCE
Status: COMPLETED | OUTPATIENT
Start: 2025-04-24 | End: 2025-04-24

## 2025-04-24 RX ORDER — HEPARIN 100 UNIT/ML
500 SYRINGE INTRAVENOUS AS NEEDED
OUTPATIENT
Start: 2025-04-24

## 2025-04-24 RX ORDER — HEPARIN SODIUM,PORCINE/PF 10 UNIT/ML
50 SYRINGE (ML) INTRAVENOUS AS NEEDED
OUTPATIENT
Start: 2025-04-24

## 2025-04-24 RX ORDER — ALBUTEROL SULFATE 0.83 MG/ML
3 SOLUTION RESPIRATORY (INHALATION) AS NEEDED
Status: DISCONTINUED | OUTPATIENT
Start: 2025-04-24 | End: 2025-04-24 | Stop reason: HOSPADM

## 2025-04-24 RX ORDER — FAMOTIDINE 10 MG/ML
20 INJECTION, SOLUTION INTRAVENOUS ONCE AS NEEDED
Status: DISCONTINUED | OUTPATIENT
Start: 2025-04-24 | End: 2025-04-24 | Stop reason: HOSPADM

## 2025-04-24 RX ORDER — EPINEPHRINE 0.3 MG/.3ML
0.3 INJECTION SUBCUTANEOUS EVERY 5 MIN PRN
Status: DISCONTINUED | OUTPATIENT
Start: 2025-04-24 | End: 2025-04-24 | Stop reason: HOSPADM

## 2025-04-24 RX ORDER — DIPHENHYDRAMINE HYDROCHLORIDE 50 MG/ML
50 INJECTION, SOLUTION INTRAMUSCULAR; INTRAVENOUS AS NEEDED
Status: DISCONTINUED | OUTPATIENT
Start: 2025-04-24 | End: 2025-04-24 | Stop reason: HOSPADM

## 2025-04-24 RX ORDER — DEXAMETHASONE 6 MG/1
12 TABLET ORAL ONCE
Status: COMPLETED | OUTPATIENT
Start: 2025-04-24 | End: 2025-04-24

## 2025-04-24 RX ORDER — HEPARIN 100 UNIT/ML
500 SYRINGE INTRAVENOUS AS NEEDED
Status: DISCONTINUED | OUTPATIENT
Start: 2025-04-24 | End: 2025-04-24 | Stop reason: HOSPADM

## 2025-04-24 RX ORDER — PROCHLORPERAZINE EDISYLATE 5 MG/ML
10 INJECTION INTRAMUSCULAR; INTRAVENOUS EVERY 6 HOURS PRN
Status: DISCONTINUED | OUTPATIENT
Start: 2025-04-24 | End: 2025-04-24 | Stop reason: HOSPADM

## 2025-04-24 RX ORDER — PROPRANOLOL HYDROCHLORIDE 10 MG/1
10 TABLET ORAL 2 TIMES DAILY PRN
Qty: 30 TABLET | Refills: 1 | Status: SHIPPED | OUTPATIENT
Start: 2025-04-24

## 2025-04-24 RX ORDER — PROCHLORPERAZINE MALEATE 10 MG
10 TABLET ORAL EVERY 6 HOURS PRN
Status: DISCONTINUED | OUTPATIENT
Start: 2025-04-24 | End: 2025-04-24 | Stop reason: HOSPADM

## 2025-04-24 RX ADMIN — ONDANSETRON HYDROCHLORIDE 16 MG: 8 TABLET, FILM COATED ORAL at 10:47

## 2025-04-24 RX ADMIN — DEXAMETHASONE 12 MG: 6 TABLET ORAL at 10:47

## 2025-04-24 RX ADMIN — PACLITAXEL 445 MG: 100 INJECTION, POWDER, LYOPHILIZED, FOR SUSPENSION INTRAVENOUS at 11:33

## 2025-04-24 RX ADMIN — DIPHENHYDRAMINE HYDROCHLORIDE 25 MG: 25 CAPSULE ORAL at 10:47

## 2025-04-24 RX ADMIN — CYCLOPHOSPHAMIDE 1000 MG: 1 INJECTION, POWDER, FOR SOLUTION INTRAVENOUS; ORAL at 12:28

## 2025-04-24 ASSESSMENT — ENCOUNTER SYMPTOMS
FATIGUE: 0
NAUSEA: 0
ARTHRALGIAS: 1
APPETITE CHANGE: 0
VOMITING: 0
HOT FLASHES: 1
RESPIRATORY NEGATIVE: 1
FEVER: 0
HEADACHES: 0
CONSTIPATION: 0
EYES NEGATIVE: 1
HEMATOLOGIC/LYMPHATIC NEGATIVE: 1
SLEEP DISTURBANCE: 1
DIARRHEA: 0
NERVOUS/ANXIOUS: 1
UNEXPECTED WEIGHT CHANGE: 0

## 2025-04-24 ASSESSMENT — PAIN SCALES - GENERAL: PAINLEVEL_OUTOF10: 0-NO PAIN

## 2025-04-24 NOTE — PROGRESS NOTES
Patient ID: Ligia Franklin is a 30 y.o. female.  Diagnosis:  Right breast IDC  MedOnc: Dr. Garcia  Primary Care Provider: JENNIFER Ponce  Referring Provider: No referring provider defined for this encounter.  Visit Type: Follow Up    Date of Service: 04/24/25  Location: [facility]     Patient Care Team:  JENNIFER Ponce as PCP - General (Internal Medicine)  JENNIFER Ya as PCP - [facility] PCP  Tabitha Trevino MD as Obstetrician (Obstetrics and Gynecology)  Marion Garcia MD as Consulting Physician (Hematology and Oncology)  JEWEL Gleason as  (Oncology)  Jennifer Inman RN as Registered Nurse (Hematology and Oncology)  Ronal Bender MD as Consulting Physician (Hematology and Oncology)    Current Therapy: TC     ONCOLOGIC HISTORY     Malignant neoplasm of female breast, Clinical: Stage IB (cT1, cN0, cM0, G3, ER+, NV-, HER2-)    Right breast IDC, stage 1 [id number], ER+95/NV+35, [id number] +BRCA2  - initially diagnosed after new brown nipple discharge during shower shortly after her son was born and was still breastfeeding  - Dx 2/9/2023 with 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 It was ER+95%, NV+35%  - Given her young age of onset and unknown biological history as she was adopted, she was genetically tested and tested positive for the BRCA2 mutation  -  3/31/2023 Dr. Addis العلي performed bilateeral skin sparing mastectomy and right sentinel lymph node biopsy (0/3), DCIS spanning 5cm on path.Focal 1mm anterior margin. Left skin sparing mastectomy showed benign findings. Dr. Jim Scruggs plastics did immediate reconstruction with bilateral prepectoral direct saline implants  - Reviewed at tumor board and no re-excision recommended and no RT  - Given DCIS findings alone and s/p bilateral mastectomy tumor there was not indication for endocrine therapy  - She also met with gyn/onc Dr. Tabitha Trevino and ppx oopherectomy  was recommended when she was done with child bearing.  - follow up 10/21/24 for annual follow with plastics Dr Scruggs and reported discomfort with implants moving around and felt they were too large expressing interest in revision reconstruction  -  11/12/24 she underwent bilateral revision reconstruction with implant exchange, and fat grafting. Unfortunately on pathology she was found to have IDC G3, measuring 5mm in greatest dimension involving fibrous and fibroadipose tissue with DCIS and margins could not be assessed  - reexcision on 12/20/24 and noted to have two foci of IDC with DCIS with a positive new inferior margin. Final tumor size 12 mm overall grade 2. pT1cNx  - Oncotype completed and found to have RS 31  - 1/22/25 - revision of right breast reconstruction, right breast capsulotomy, right breast margin re-excision and partial mastectomy   - Given oncotype score 31 and her young age adjuvant chemotherapy was recommended  - We discussed Taxotere + Cytoxan given node negative and also discussed AC+T however due to her schedule and concern for longer term toxicity, they are very reluctant and reviewed there is not strong data that anthracycline based therapy is needed at this time  - 2/3/25 - seen by Spring Ferrera for fertility preservation, elected to not proceed   - 2/17/25 - Cycle 1 Day 1 TC with scalp cooling   3/10/25: Cycle 2 TC - reaction to taxotere   3/13/25: Received abraxane + Cytoxan for Cycle 2   4/3/25: Cycle 3 Cytoxan + Abraxane  4/24/2025: Cycle 4 Cytoxan + Abraxane   4/19/2025: ED for anxiety/Chest pain     Oncology History   Malignant neoplasm of female breast   6/5/2024 Initial Diagnosis    Malignant neoplasm of female breast     12/10/2024 Cancer Staged    Staging form: Breast, AJCC 8th Edition, Clinical: Stage IB (cT1, cN0, cM0, G3, ER+, [place]-, HER2-) - Signed by Addis العلي DO on 12/10/2024     2/17/2025 -  Chemotherapy    CUSTOM Abraxane + CycloPHOSphamide, 21 Day Cycles         Other Contributing History    Subjective      INTERVAL HISTORY     Ligia Franklin is a 30 y.o. female who presents today for follow up of breast cancer. Patient of Dr. Garcia currently on abraxane + Cytoxan and monthly Lupron. She is doing well. Most side effects are well managed. She saw Dr. Garcia last week in anticipation of her last cycle. On 4/19 she ended up in the ED for chest pain. Now believed to be related to anxiety. Comes and goes, no pattern, no other associated symptoms.     Review of Systems   Constitutional:  Negative for appetite change, fatigue, fever and unexpected weight change.   HENT:   Negative for mouth sores.    Eyes: Negative.    Respiratory: Negative.     Cardiovascular:  Positive for chest pain.   Gastrointestinal:  Negative for constipation, diarrhea, nausea and vomiting.   Endocrine: Positive for hot flashes.   Genitourinary: Negative.     Musculoskeletal:  Positive for arthralgias.   Skin: Negative.    Neurological:  Negative for headaches.   Hematological: Negative.    Psychiatric/Behavioral:  Positive for sleep disturbance. The patient is nervous/anxious.      Objective      /81 (BP Location: Left arm, Patient Position: Sitting, BP Cuff Size: Adult)   Pulse 109   Temp 37 °C (98.6 °F) (Temporal)   Resp 17   Wt 72 kg (158 lb 11.7 oz)   SpO2 97%   BMI 27.25 kg/m²   BSA: 1.8 meters squared    Wt Readings from Last 5 Encounters:   04/24/25 72 kg (158 lb 11.7 oz)   04/19/25 72.6 kg (160 lb)   04/18/25 72.6 kg (160 lb 0.9 oz)   04/07/25 74.1 kg (163 lb 5.8 oz)   04/03/25 73 kg (160 lb 15 oz)     Performance Status:  ECOG Score: 0- Fully active, able to carry on all pre-disease performance w/o restriction.  Karnofsky Score: 90 - Able to carry on normal activity; minor signs or symptoms of disease     PHYSICAL EXAM   Physical Exam  Constitutional:       General: She is not in acute distress.     Appearance: Normal appearance. She is not toxic-appearing.   HENT:      Head:  "Normocephalic and atraumatic.   Eyes:      Pupils: Pupils are equal, round, and reactive to light.   Pulmonary:      Effort: Pulmonary effort is normal.   Musculoskeletal:         General: Normal range of motion.      Cervical back: Normal range of motion.   Skin:     Comments: alopecia   Neurological:      General: No focal deficit present.      Mental Status: She is alert and oriented to person, place, and time.      Motor: No weakness.   Psychiatric:         Mood and Affect: Mood normal. Affect is tearful.         Behavior: Behavior normal.         Thought Content: Thought content normal.         Judgment: Judgment normal.         Allergies  Allergies   Allergen Reactions    Docetaxel Palpitations     Patient reporting overall not feeling well, mild to moderate facial flushing, a warmth coming over her like she's going to pass out, some dizziness, slight nausea, and a feeling of a \"pounding chest\".      Medications  Current Outpatient Medications   Medication Instructions    bimatoprost (Latisse) 0.03 % ophthalmic solution Place 1 drop on applicator and apply along skin of upper eyelid at base of eyelashes daily at bedtime; rpt for 2nd eye with clean applicator    buPROPion XL (WELLBUTRIN XL) 300 mg, oral, Nightly, Do not crush, chew, or split.    diazePAM (VALIUM) 5 mg, oral, Every 12 hours PRN    ibuprofen 800 mg, oral, Every 6 hours PRN    OLANZapine (ZYPREXA) 2.5 mg, oral, Nightly, Start night of chemo    ondansetron (ZOFRAN) 8 mg, oral, Every 8 hours PRN    ondansetron ODT (ZOFRAN-ODT) 4 mg, oral, Every 8 hours PRN    prochlorperazine (COMPAZINE) 10 mg, oral, Every 6 hours PRN    propranolol (INDERAL) 10 mg, oral, 2 times daily PRN    scopolamine (Transderm-Scop) 1 mg over 3 days patch 3 day 1 patch, transdermal, Every 72 hours        Diagnostic Results   RESULTS     No results found for this or any previous visit (from the past 96 hours).   Latest Reference Range & Units 04/18/25 09:17   GLUCOSE 74 - 99 " mg/dL 88   SODIUM 136 - 145 mmol/L 140   POTASSIUM 3.5 - 5.3 mmol/L 3.8   CHLORIDE 98 - 107 mmol/L 105   Bicarbonate 21 - 32 mmol/L 27   Anion Gap 10 - 20 mmol/L 12   Blood Urea Nitrogen 6 - 23 mg/dL 16   Creatinine 0.50 - 1.05 mg/dL 0.88   EGFR >60 mL/min/1.73m*2 >90   Calcium 8.6 - 10.3 mg/dL 9.1   Albumin 3.4 - 5.0 g/dL 4.4   Alkaline Phosphatase 33 - 110 U/L 96   ALT 7 - 45 U/L 16   AST 9 - 39 U/L 11   Bilirubin Total 0.0 - 1.2 mg/dL 0.3      Latest Reference Range & Units 04/19/25 12:52   WBC 4.4 - 11.3 x10*3/uL 8.2   nRBC 0.0 - 0.0 /100 WBCs 0.0   RBC 4.00 - 5.20 x10*6/uL 3.59 (L)   HEMOGLOBIN 12.0 - 16.0 g/dL 11.1 (L)   HEMATOCRIT 36.0 - 46.0 % 33.6 (L)   MCV 80 - 100 fL 94   MCH 26.0 - 34.0 pg 30.9   MCHC 32.0 - 36.0 g/dL 33.0   RED CELL DISTRIBUTION WIDTH 11.5 - 14.5 % 14.6 (H)   Platelets 150 - 450 x10*3/uL 194   Neutrophils % 40.0 - 80.0 % 72.8   Immature Granulocytes %, Automated 0.0 - 0.9 % 0.6   Lymphocytes % 13.0 - 44.0 % 18.3   Monocytes % 2.0 - 10.0 % 6.4   Eosinophils % 0.0 - 6.0 % 1.2   Basophils % 0.0 - 2.0 % 0.7   Neutrophils Absolute 1.20 - 7.70 x10*3/uL 5.95   Immature Granulocytes Absolute, Automated 0.00 - 0.70 x10*3/uL 0.05   Lymphocytes Absolute 1.20 - 4.80 x10*3/uL 1.50   Monocytes Absolute 0.10 - 1.00 x10*3/uL 0.52   Eosinophils Absolute 0.00 - 0.70 x10*3/uL 0.10   Basophils Absolute 0.00 - 0.10 x10*3/uL 0.06   (L): Data is abnormally low  (H): Data is abnormally high  Assessment/Plan   ASSESSMENT     Ligia Franklin is a 30 y.o. female with right breast IDC ER/RI +, HER2 negative, BRCA 2 pT1cNx s/p surgery on 1/22/2025 and started on adjuvant TC. Severe reaction to Cycle 2 Taxotere and was switched to Abraxane + Cytoxan. Today she presents for evaluation prior to Cycle 4, final cycle. There is no role for XRT. Plan is to see her 3 months post her last cycle and start endocrine therapy at that time with aromatase inhibitor. She will continue lupron every 4 weeks as scheduled.     Continues  to tolerate treatment. Has been experiencing intermittent chest pain with no other associated symptoms. Was evaluated in the ED. Now believed to be related to anxiety. She was given Valium. Does not feel it helps. She is tearful in clinic today. She has been on Wellbutrin - no change in dosing. We discussed there are many reasons to be feeling anxious and some ways to help calm herself. The anxiety is unpredictable. We discussed using distractions, relaxation, meditation, counting, deep breathing when she feels it coming on. I will also send a betablocker that can help calm her anxiety related chest pain since the benzodiazapine did not help much. I informed her that medications are only partially helpful and that talk therapy with anxiety reducing techniques may be more beneficial. She has never done talk therapy before. She is willing to see onco-psych - referral placed.     Labs are acceptable for treatment today.     PLAN     # Breast Cancer   - Proceed with Cycle 4 Abraxane + Cytoxan today   - Continue Lupron every 28 days   - Neulasta today     # Anxiety/Chest pain  - Propranolol 10 mg BID as needed for anxiety   - Onco psych referral   - Deep breathing, mediatation, relaxation, distraction     # Hot flashes   - Fans, clothing layers, cooling blanket    # Nausea   - As needed antiemetics   - Olanzapine x 1 week post chemo    # Body aches  - IBU as needed  - OK for delta 9 THC gummies and CBD  - Monitor left hip pan    Follow up in in July with Dr. Garcia as scheduled.      Ligia was seen today for op infusion and follow-up.  Diagnoses and all orders for this visit:  Anxiety (Primary)  -     Referral to Onco-Psychiatry; Future  -     propranolol (Inderal) 10 mg tablet; Take 1 tablet (10 mg) by mouth 2 times a day as needed (anxiety).  Malignant neoplasm of nipple of right breast in female, estrogen receptor positive  Generalized anxiety disorder      CUSTOM Abraxane + CycloPHOSphamide, 21 Day Cycles  Venous  Access Orders  Leuprolide Acetate, Every 28 Days    Patient verbalizes understanding of above plan. Time provided for patient's questions. All questions answered to patient's satisfaction in office. Patient instructed to reach out for any new concerning issues at [phone].    Shara Paulson MSN, APRN, A-GNP-C, AOP  [facility]  Division of Hematology & Medical Oncology   [place] [address]  Phone: [phone]  Available via [employer] Secure Chat  [email or web address]

## 2025-05-02 ENCOUNTER — OFFICE VISIT (OUTPATIENT)
Facility: CLINIC | Age: 30
End: 2025-05-02
Payer: COMMERCIAL

## 2025-05-02 VITALS
HEART RATE: 101 BPM | TEMPERATURE: 99.1 F | BODY MASS INDEX: 26.94 KG/M2 | WEIGHT: 160.5 LBS | SYSTOLIC BLOOD PRESSURE: 129 MMHG | DIASTOLIC BLOOD PRESSURE: 83 MMHG | OXYGEN SATURATION: 97 % | RESPIRATION RATE: 18 BRPM

## 2025-05-02 DIAGNOSIS — R07.89 OTHER CHEST PAIN: Primary | ICD-10-CM

## 2025-05-02 PROCEDURE — 99213 OFFICE O/P EST LOW 20 MIN: CPT | Performed by: NURSE PRACTITIONER

## 2025-05-02 PROCEDURE — 1036F TOBACCO NON-USER: CPT | Performed by: NURSE PRACTITIONER

## 2025-05-02 PROCEDURE — 99203 OFFICE O/P NEW LOW 30 MIN: CPT | Performed by: NURSE PRACTITIONER

## 2025-05-02 ASSESSMENT — PAIN SCALES - GENERAL: PAINLEVEL_OUTOF10: 0-NO PAIN

## 2025-05-02 NOTE — PATIENT INSTRUCTIONS
It was a pleasure meeting you today.  Here is our plan:  --Schedule an echocardiogram at your convenience.  I will send you a [employer] message after I review the results  --Consider taking a daily Pepcid to help with gastritis/possible esophageal spasms

## 2025-05-02 NOTE — PROGRESS NOTES
CARDIO-ONCOLOGY PROGRESS NOTE  Name: Ligia Franklin   MRN: [id number]  : 1995  Date of Service: 25  Medical Oncologist: MD Ronal Davis MD    CHIEF COMPLAINT:  Ligia Franklin is a 30 y.o. female who presents to clinic for evaluation of chest pain in setting of chemotherapy for breast cancer    HPI:  Oncology History:  -3/31/23:  B mastectomy for ER/VA+ DCIS with immediate breast reconstruction.  Patient has BRCA2 mutation  -24:  Revision reconstruction.  Found on pathology to have multifocal IDC and DCIS, pT1cNx   -25-3/10/25: TC.  Stopped for HSR to Taxotere  -3/13-25:  Abraxane/Cytoxan    Cardiology History:  -Echocardiograms in  and  to evaluate for structural abnormalities.  Patient is adopted, and biological mother's autopsy report noted RV partially replaced by adipose tissue.  Both echocardiograms showed structurally normal heart    INTERVAL HISTORY:  -Presented to ED  with CP.  Serial troponins negative.  EKG showed sinus arrhythmia  -Multiple, intermittent, self-limiting episodes of 4/10 CP while at rest since HSR to Taxotere.  No CP prior to this.  No association with vigorous physical activity.  No associated syncope/presyncope, dizziness/lightheadedness, dyspnea, palpitations  -No edema    ROS:  -Negative except as described in interval history    PROBLEM LIST:  Problem List[1]     PAST SURGICAL HISTORY:  Surgical History[2]    MEDICATIONS:  Medications Ordered Prior to Encounter[3]    ALLERGIES:  Allergies[4]    SOCIAL HISTORY:  Social History[5]    FAMILY HISTORY:  Family History[6]    VITAL SIGNS:  /83 (BP Location: Left arm, Patient Position: Sitting, BP Cuff Size: Adult)   Pulse 101   Temp 37.3 °C (99.1 °F) (Temporal)   Resp 18   Wt 72.8 kg (160 lb 8 oz)   SpO2 97%   BMI 26.94 kg/m²    Body mass index is 26.94 kg/m².     LABS:  Lab Results   Component Value Date    GLUCOSE 98 2025    CALCIUM 9.4 2025      "04/19/2025    K 4.4 04/19/2025    CO2 26 04/19/2025     04/19/2025    BUN 16 04/19/2025    CREATININE 0.99 04/19/2025     Lab Results   Component Value Date    WBC 8.2 04/19/2025    HGB 11.1 (L) 04/19/2025    HCT 33.6 (L) 04/19/2025    MCV 94 04/19/2025     04/19/2025     Lab Results   Component Value Date    CHOL 195 04/23/2022     Lab Results   Component Value Date    HDL 48.0 04/23/2022     No results found for: \"LDLCALC\"  Lab Results   Component Value Date    TRIG 190 (H) 04/23/2022     No components found for: \"CHOLHDL\"     ECHO:    EKG:  Encounter Date: 04/19/25   ECG 12 lead   Result Value    Ventricular Rate 91    Atrial Rate 91    MT Interval 136    QRS Duration 82    QT Interval 376    QTC Calculation(Bazett) 462    P Axis 62    R Axis 26    T Axis 46    QRS Count 15    Q Onset 223    P Onset 155    P Offset 193    T Offset 411    QTC Fredericia 432    Narrative    Normal sinus rhythm with sinus arrhythmia  Normal ECG  When compared with ECG of 19-APR-2025 12:26, (unconfirmed)  No significant change was found        PHYSICAL EXAM:  General: Well-developed, well-nourished, NAD  Skin: No rash, jaundice, or lesions  HEENT: Normocephalic, atraumatic.  Sclerae anicteric. MMMI   Cardiovascular:  HRRR.  No murmur, gallop, or rub.  No cyanosis or clubbing.  No JVD.  No edema  Lungs:  CTAB  Musculoskeletal: Full ROM grossly intact  Neurological: A&O x 4  Psychiatric: Mood and affect appropriate    ASSESSMENT/PLAN:  #CP:  -Patient has completed adjuvant chemotherapy for breast cancer.  Neither chemotherapies are associated with cardiomyopathy or coronary artery vasospasm.  Cardiac workup in ED was negative  -Low suspicion that CP is cardiac in etiology, but given biological mother's hx of RV pathology, will order another echocardiogram for patient's reassurance  -Ddx of CP includes chemo-associated esophagitis/gastritis, esophageal spasm, pleurisy.  Discussed trial of daily antiacid to see if that " improves sxs  -Will contact patient with results of echocardiogram.  If normal, patient does not need further follow-up with cardio-oncology clinic, but may return as needs arise      30 minutes spent on face-to-face and non-face-to-face patient care      JENNIFER Pierre           [1]   Patient Active Problem List  Diagnosis    Abdominal left upper quadrant tenderness    Abnormal finding on breast imaging    BRCA2 positive    Dense breast    Ductal carcinoma in situ (DCIS) of right breast    Low vitamin D level    Muscle tension headache    Nipple discharge in female    Tailbone injury    Contraception management    Encounter for follow-up surveillance of breast cancer    S/P bilateral mastectomy    Difficulty maintaining weight    Malignant neoplasm of female breast    S/P breast reconstruction, bilateral    Postoperative breast asymmetry    PONV (postoperative nausea and vomiting)    Depression    Anxiety    Generalized anxiety disorder   [2]   Past Surgical History:  Procedure Laterality Date    BREAST BIOPSY  March    MASTECTOMY Bilateral March    MASTECTOMY, PARTIAL Right 12/20/2024    RIGHT MAG SEED BRACKETED PARTIAL MASTECTOMY (Right)      Breast Mastectomy w/Immediate Reconstruction Uni (Right)   [3]   Current Outpatient Medications on File Prior to Visit   Medication Sig Dispense Refill    bimatoprost (Latisse) 0.03 % ophthalmic solution Place 1 drop on applicator and apply along skin of upper eyelid at base of eyelashes daily at bedtime; rpt for 2nd eye with clean applicator (Patient not taking: Reported on 4/24/2025) 5 mL 3    buPROPion XL (Wellbutrin XL) 300 mg 24 hr tablet Take 1 tablet (300 mg) by mouth once daily at bedtime. Do not crush, chew, or split. 90 tablet 4    diazePAM (Valium) 5 mg tablet Take 1 tablet (5 mg) by mouth every 12 hours if needed for anxiety. 30 tablet 1    ibuprofen 800 mg tablet Take 1 tablet (800 mg) by mouth every 6 hours if needed for headaches. 120 tablet 2     "OLANZapine (ZyPREXA) 2.5 mg tablet Take 1 tablet (2.5 mg) by mouth once daily at bedtime. Start night of chemo 90 tablet 0    ondansetron (Zofran) 8 mg tablet Take 1 tablet (8 mg) by mouth every 8 hours if needed for nausea or vomiting. 30 tablet 5    ondansetron ODT (Zofran-ODT) 4 mg disintegrating tablet Dissolve 1 tablet (4 mg) in the mouth every 8 hours if needed for nausea or vomiting. 9 tablet 0    prochlorperazine (Compazine) 10 mg tablet Take 1 tablet (10 mg) by mouth every 6 hours if needed for nausea or vomiting. 30 tablet 5    propranolol (Inderal) 10 mg tablet Take 1 tablet (10 mg) by mouth 2 times a day as needed (anxiety). 30 tablet 1    scopolamine (Transderm-Scop) 1 mg over 3 days patch 3 day Place 1 patch over 72 hours on the skin every 3rd day. (Patient not taking: Reported on 4/24/2025) 1 patch 3     No current facility-administered medications on file prior to visit.   [4]   Allergies  Allergen Reactions    Docetaxel Palpitations     Patient reporting overall not feeling well, mild to moderate facial flushing, a warmth coming over her like she's going to pass out, some dizziness, slight nausea, and a feeling of a \"pounding chest\".   [5]   Social History  Tobacco Use    Smoking status: Never    Smokeless tobacco: Never   Vaping Use    Vaping status: Never Used   Substance Use Topics    Alcohol use: Never    Drug use: Never   [6]   Family History  Problem Relation Name Age of Onset    Other (RIGHT VENTRICULAR DYSPLASIA) Mother      Cancer Father Dont know     Breast cancer Maternal Grandmother       "

## 2025-05-05 ENCOUNTER — APPOINTMENT (OUTPATIENT)
Dept: HEMATOLOGY/ONCOLOGY | Facility: CLINIC | Age: 30
End: 2025-05-05
Payer: COMMERCIAL

## 2025-05-13 ENCOUNTER — HOSPITAL ENCOUNTER (OUTPATIENT)
Dept: CARDIOLOGY | Facility: CLINIC | Age: 30
Discharge: HOME | End: 2025-05-13
Payer: COMMERCIAL

## 2025-05-13 DIAGNOSIS — R07.89 OTHER CHEST PAIN: ICD-10-CM

## 2025-05-13 PROCEDURE — 93306 TTE W/DOPPLER COMPLETE: CPT | Performed by: INTERNAL MEDICINE

## 2025-05-13 PROCEDURE — 93306 TTE W/DOPPLER COMPLETE: CPT

## 2025-05-15 LAB
AORTIC VALVE PEAK VELOCITY: 1.24 M/S
AV PEAK GRADIENT: 6 MMHG
AVA (PEAK VEL): 3.1 CM2
EJECTION FRACTION APICAL 4 CHAMBER: 67
EJECTION FRACTION: 68 %
LEFT ATRIUM VOLUME AREA LENGTH INDEX BSA: 15.7 ML/M2
LEFT VENTRICLE INTERNAL DIMENSION DIASTOLE: 4.7 CM (ref 3.5–6)
LEFT VENTRICULAR OUTFLOW TRACT DIAMETER: 2.15 CM
MITRAL VALVE E/A RATIO: 1.15
RIGHT VENTRICLE FREE WALL PEAK S': 15 CM/S
TRICUSPID ANNULAR PLANE SYSTOLIC EXCURSION: 1.8 CM

## 2025-05-19 ENCOUNTER — APPOINTMENT (OUTPATIENT)
Dept: GYNECOLOGIC ONCOLOGY | Facility: CLINIC | Age: 30
End: 2025-05-19
Payer: COMMERCIAL

## 2025-05-19 ENCOUNTER — TELEPHONE (OUTPATIENT)
Dept: GYNECOLOGIC ONCOLOGY | Facility: HOSPITAL | Age: 30
End: 2025-05-19
Payer: COMMERCIAL

## 2025-05-19 NOTE — TELEPHONE ENCOUNTER
Patient called to report tender raised area to the right of vaginal introitus.    Patient states she noticed area about 3-4 days ago.  Denies drainage.    Area is tender to the touch only.    Message routed to Cheli Barnett CNP    [id number] Appointment offered with Cheli Barnett CNP on 5/21 at [facility].   Patient requesting [place] location due to  reasons.   Office visit scheduled with Aliyah Dodson CNP on 5/20/25.  Patient notified per Lisa Sierra

## 2025-05-20 ENCOUNTER — APPOINTMENT (OUTPATIENT)
Dept: HEMATOLOGY/ONCOLOGY | Facility: CLINIC | Age: 30
End: 2025-05-20
Payer: COMMERCIAL

## 2025-05-20 ENCOUNTER — OFFICE VISIT (OUTPATIENT)
Dept: GYNECOLOGIC ONCOLOGY | Facility: CLINIC | Age: 30
End: 2025-05-20
Payer: COMMERCIAL

## 2025-05-20 VITALS
DIASTOLIC BLOOD PRESSURE: 83 MMHG | OXYGEN SATURATION: 97 % | TEMPERATURE: 99.3 F | HEART RATE: 89 BPM | BODY MASS INDEX: 26.83 KG/M2 | WEIGHT: 159.83 LBS | SYSTOLIC BLOOD PRESSURE: 119 MMHG | RESPIRATION RATE: 16 BRPM

## 2025-05-20 DIAGNOSIS — Z15.01 BRCA2 POSITIVE: ICD-10-CM

## 2025-05-20 DIAGNOSIS — Z15.09 BRCA2 POSITIVE: ICD-10-CM

## 2025-05-20 DIAGNOSIS — N75.0 BARTHOLIN'S GLAND CYST: Primary | ICD-10-CM

## 2025-05-20 PROCEDURE — 1036F TOBACCO NON-USER: CPT | Performed by: NURSE PRACTITIONER

## 2025-05-20 PROCEDURE — 99213 OFFICE O/P EST LOW 20 MIN: CPT | Performed by: NURSE PRACTITIONER

## 2025-05-20 ASSESSMENT — PAIN SCALES - GENERAL: PAINLEVEL_OUTOF10: 0-NO PAIN

## 2025-05-20 NOTE — PROGRESS NOTES
Patient ID: Ligia Franklin is a 30 y.o. female.  Referring Physician: No referring provider defined for this encounter.  Primary Care Provider: JENNIFER Ponce    Subjective    29 yo with BRCA2 mutation.  She had a double mastectomy in 3/2023 for breast cancer and this was discovered when she found brown nipple discharge. She recently went in for surgical revision and a 2cm mass was found, concerning for recurrent disease. She underwent excision on     Nursing Note:  Patient called to report tender raised area to the right of vaginal introitus.    Patient states she noticed area about 3-4 days ago.  Denies drainage.    Area is tender to the touch only.    Message routed to Cheli Barnett CNP  [id number] Appointment offered with Cheli Barnett CNP on  at [facility].   Patient requesting [facility] due to  reasons.   Office visit scheduled with Aliyah Dodson CNP on 25.  Patient notified per Lisa Sierra    Interval:  Patient denies any vaginal bleeding, no periods since Lupron.  She is currently off Lupron due to chest pain she was having.  She did have cardiac workup which was negative.  She noticed new area on right, no discharge, denies pain.  Been sexually active with no issues.       Pmh - none  PSH - none    SH  She lives with her  and child and is employed as a .  Denies tobacco, alcohol or illicit drug use.    FH  Her father had cancer  Her grandmother had breast cancer  No family history of uterine, ovarian or colon cancer.     Ob/Gyn hx   1 vaginal delivery  Denies abnormal PAPs    HPI  Objective    BSA: 1.82 meters squared  /83 (BP Location: Left arm, Patient Position: Sitting, BP Cuff Size: Adult)   Pulse 89   Temp 37.4 °C (99.3 °F) (Temporal)   Resp 16   Wt 72.5 kg (159 lb 13.3 oz)   SpO2 97%   BMI 26.83 kg/m²      Physical Exam:    Constitutional: Doing well. KEY  Eyes: PERRL  ENMT: Moist mucus  membranes  Head/Neck: Supple. Symmetrical  Cardiovascular: Regular, rate and rhythm. 2+ equal pulses of the extremities  Respiratory/Thorax: CTA. RRR. Chest rise symmetrical.  Gastrointestinal: Non-distended, soft, non-tender  Genitourinary:   Normal external female genitalia. No vulvar lesions noted.  Right vaginal introitus with +Bartholins gland cyst, no drainage or pain with palpation.    Musculoskeletal: ROM intact, no joint swelling, normal strength  Extremities: No edema  Neurological: Alert and oriented x 3. Pleasant and cooperative.  Lymphatic: No lymphadenopathy. No lymphedema  Psychological: Appropriate mood and behavior  Skin: Warm and dry, no lesions, no rashes    A complete review of systems was performed and all systems were normal except what is noted in the interval history.     Performance Status:  Asymptomatic    Assessment/Plan   Patient is a 30 year old female with breast cancer and BRCA2 mutation.  +Bartholin's gland cyst.       Plan:   May use warm compress, warm bath for comfort/drainage if necessary.  Call office if symptoms worsen  F/U in 1/2026 as scheduled for well woman exam.       Oncology History   Malignant neoplasm of female breast   6/5/2024 Initial Diagnosis    Malignant neoplasm of female breast     12/10/2024 Cancer Staged    Staging form: Breast, AJCC 8th Edition, Clinical: Stage IB (cT1, cN0, cM0, G3, ER+, [place]-, HER2-) - Signed by Addis العلي DO on 12/10/2024     2/17/2025 -  Chemotherapy    CUSTOM Abraxane + CycloPHOSphamide, 21 Day Cycles          JOEY Calderón-CNP.

## 2025-05-21 ENCOUNTER — APPOINTMENT (OUTPATIENT)
Dept: GYNECOLOGIC ONCOLOGY | Facility: HOSPITAL | Age: 30
End: 2025-05-21
Payer: COMMERCIAL

## 2025-05-23 ENCOUNTER — TELEPHONE (OUTPATIENT)
Dept: GYNECOLOGIC ONCOLOGY | Facility: HOSPITAL | Age: 30
End: 2025-05-23
Payer: COMMERCIAL

## 2025-05-23 NOTE — TELEPHONE ENCOUNTER
Phoned patient in follow up to below [employer] message.  Patient reports continued irritation/pain at bartholin gland cyst site.    Discomfort is unchanged since 5/20 office visit with Aliyah Dodson CNP.   Patient denies drainage from cyst site.    Patient states she has been using warm compresses, taking warm baths and warm showers with no relief of discomfort.   Afebrile.    Message routed to Aliyah Dodson CNP    0534  Return [employer] message sent to patient to notify to continue warm baths, compresses and take Tylenol or Ibuprofen as needed for discomfort.   Notified patient that Aliyah recommends return office visit next week if symptoms not improved.             Hi! What else can I do to try to get this to go away? Still uncomfortable

## 2025-06-02 ENCOUNTER — APPOINTMENT (OUTPATIENT)
Dept: CARDIOLOGY | Facility: CLINIC | Age: 30
End: 2025-06-02
Payer: COMMERCIAL

## 2025-06-02 ENCOUNTER — APPOINTMENT (OUTPATIENT)
Dept: HEMATOLOGY/ONCOLOGY | Facility: CLINIC | Age: 30
End: 2025-06-02
Payer: COMMERCIAL

## 2025-06-03 ENCOUNTER — OFFICE VISIT (OUTPATIENT)
Dept: SURGICAL ONCOLOGY | Facility: CLINIC | Age: 30
End: 2025-06-03
Payer: COMMERCIAL

## 2025-06-03 DIAGNOSIS — Z17.0 MALIGNANT NEOPLASM OF CENTRAL PORTION OF RIGHT BREAST IN FEMALE, ESTROGEN RECEPTOR POSITIVE: Primary | ICD-10-CM

## 2025-06-03 DIAGNOSIS — C50.111 MALIGNANT NEOPLASM OF CENTRAL PORTION OF RIGHT BREAST IN FEMALE, ESTROGEN RECEPTOR POSITIVE: Primary | ICD-10-CM

## 2025-06-03 PROCEDURE — 99214 OFFICE O/P EST MOD 30 MIN: CPT | Performed by: SURGERY

## 2025-06-03 NOTE — PROGRESS NOTES
BREAST SURGICAL ONCOLOGY FOLLOW UP     Assessment/Plan     1. right breast nipple discharge with DCIS g2 at 6:30 2cmFN measuring 1.7cm MRI with 11cm of enhancement s/p bilateral SSM with 5cm of DCIS on final pathology  - [id number] stage 0  - BRCA2+  -s/p bilateral SSM right slnb(0/3)     2.  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 female presents today for follow up carcinoma of the right breast.     Treatment history  Surgery: 3/31/2023: right SSM right slnb (0/3) DCIS only spanning 5cm on path. Margins negative. Focal 1mm anterior margin. Reviewed at tumor board- no re-excision recommended.   left SSM: benign findings  11/12/2024 revision with Dr. Scruggs with IDC found on resected tissue of the right reconstructed breast   12/20/2024 right breast partial mastectomy- 2 foci of IDC with DCIS, positive inferior margin  1/22/2025 right breast margin re-excision- no residual carcinoma  Radiation: referred placed  Systemic therapy: TC X 3     Has since met with Dr. Trevino to discuss eventual risk reducing BSO at 40-45 or after childbearing is complete.    Felt her implant shifted overnight but then returned to normal position.  Possibly considering going smaller but hesitant to undergo more surgery.    She's had nipple tattooing with [employer].    Underwent bilateral breast reconstruction revision with down sizing of the  implants.  During the procedure Dr. Scruggs excised scar, skin, subcutaneous tissue down to and including capsule of both reconstructed breasts.  The excised tissue was sent to pathology.    On the right, there was a 5mm focus of invasive cancer on 1 slide.  The focus involved fibrous / fibroadipose tissue.  Because the specimen was unoriented, margin status cannot be assessed.    Ligia's case was presented at multi-disciplinary tumor board last week.     A breast MRI was performed 12/9/2024 demonstrating a focal area of enhancement  measuring 2.7cm in the lower outer quadrant of the breast adjacent to implant and capsule. Lymph nodes WNL bilaterally.  No other areas of enhancement.    12/20/2024 right breast partial mastectomy- 2 foci of IDC largest measuring 1.2cm, DCIS 2.4cm with + inferior margin.    1/22/2025 right breast inferior margin re-excision- no residual carcinoma.    TC x 4.      Adjuvant lupron + AI recommended.  Pt did have chest pain of uncertain etiology. (Lupron, vs anxiety medication vs musculoskeletal).  No current pain.  Has follow up with Dr. Garcia in July.    Review of Systems   Constitutional symptoms: Denies generalized fatigue.  Denies weight change, fevers/chills, difficulty sleeping   Eyes: Denies double vision, glaucoma, cataracts.  Ear/nose/throat/mouth: Denies hearing changes, sore throat, sinus problems.  Cardiovascular: No chest pain.  Denies irregular heartbeat.  Denies ankle swelling.  Respiratory: No wheezing, cough, or shortness of breath.  Gastrointestinal: No abdominal pain,  No nausea/vomiting.  No indigestion/heartburn.  No change in bowel habits.  No constipation or diarrhea.   Genitourinary: No urinary incontinence.  No urinary frequency.  No painful urination.  Musculoskeletal: No bone pain, no muscle pain, no joint pain.   Integumentary: No rash. No masses.  No changes in moles.  No easy bruising.  Neurological: No headaches.  No tremors. No numbness/tingling.  Psychiatric: No anxiety. No depression.  Endocrine: No excessive thirst.  Not too hot or too cold.  Not tired or fatigued.    Hematological/lymphatic: No swollen glands or blood clotting problems.  No bruising.    Objective   Physical Exam  General: Alert and oriented x 3.  Mood and affect are appropriate.  HEENT: EOMI, PERRLA.   Neck: supple, no masses, no cervical adenopathy.  Cardiovascular: no lower extremity edema.  Pulmonary: breathing non labored on room air.  GI: Abdomen soft, no masses. No hepatomegaly or splenomegaly.  Lymph nodes:  No supraclavicular or axillary adenopathy bilaterally.  Musculoskeletal: Full range of motion in the upper extremities bilaterally.  Neuro: denies dizziness, tremors    Breasts: The breasts were examined in both the seated and supine positions.    RIGHT: surgically absent with implant based reconstruction. There are no skin changes, skin thickening, or dimpling. There are no masses palpated in the RIGHT breast.  LEFT: surgically absent with implant based reconstruction. There are no skin changes, skin thickening, or dimpling. There are no masses palpated in the LEFT breast.       Radiology review: All images and reports were personally reviewed.         Addis العلي, DO

## 2025-07-07 ENCOUNTER — APPOINTMENT (OUTPATIENT)
Facility: CLINIC | Age: 30
End: 2025-07-07
Payer: COMMERCIAL

## 2025-07-07 VITALS — BODY MASS INDEX: 27.14 KG/M2 | WEIGHT: 159 LBS | HEIGHT: 64 IN

## 2025-07-07 DIAGNOSIS — Z98.890 S/P BREAST RECONSTRUCTION, BILATERAL: Primary | ICD-10-CM

## 2025-07-07 PROCEDURE — 99213 OFFICE O/P EST LOW 20 MIN: CPT | Performed by: SURGERY

## 2025-07-07 PROCEDURE — 3008F BODY MASS INDEX DOCD: CPT | Performed by: SURGERY

## 2025-07-07 PROCEDURE — 1036F TOBACCO NON-USER: CPT | Performed by: SURGERY

## 2025-07-07 NOTE — PROGRESS NOTES
Division of Plastic & Reconstructive Surgery            Postoperative visit    Date: 7/7/2025         Trevin Franklin is a 30 y.o. female who presents status post bilateral breast reconstruction revision, with removal of 490 cc structured saline implants, placement of 370 cc structured saline implants, with bilateral fat grafting and bilateral axillary dogear removal performed on 11/12/2024   Previously she is status post bilateral skin sparing mastectomy with the right sentinel node biopsy and immediate direct to implant breast reconstruction on 3/31/2023.    Unfortunately the specimens from 11/12/2024 surgery, right side specimen showed positive residual invasive carcinoma, as well as DCIS.  Status post 1/22/2025 right breast margin re-excision- no residual carcinoma.     Patient reached out to our office on 6/19/2025 complaining that she feels that part of her implant is sticking out.    Objective    There were no vitals filed for this visit.      Gen: interactive and pleasant  Head: NCAT  Eyes: EOMI, PERRLA  Mouth: MMM  Throat: trachea midline  Cor: RRR  Pulm: nonlabored breathing  Abd: s/nt/nd  Neuro: AAOx3  Ext: extremities perfused    Body mass index is 27.29 kg/m².      Focused exam of the:   Bilateral breast transverse incisions extending to the axilla are clean dry and intact  Left side is little more ptotic than right which is expected given the excisions of right.  Right implant with palpable edge but no sign of implant deformity or rupture.    Assessment/Plan       Ligia is a 30 y.o. female who presents for postoperative visit status post  bilateral breast reconstruction revision, with removal of 490 cc structured saline implants, placement of 370 cc structured saline implants, with bilateral fat grafting and bilateral axillary dogear removal performed on 11/12/2024   Previously she is status post bilateral skin sparing mastectomy with the right sentinel node biopsy and  immediate direct to implant breast reconstruction on 3/31/2023.    Unfortunately the specimens from 11/12/2024 surgery, right side specimen showed positive residual invasive carcinoma, as well as DCIS.  Status post 1/22/2025 right breast margin re-excision- no residual carcinoma.     Plan:   I reassured Ligia that the prepectoral nature of reconstruction means that the entire breast is essentially constructed by the shape of the implant and that feeling the implant contour is not abnormal.  She is assured and feels good about the reconstruction.  We discussed taking a holiday from treatment and possibly performing revision in the future, given that the right side has had excision of tissue there is a little discrepancy in ptosis between the right and the left.    Will follow-up as needed    I spent 30 minutes with this patient. Greater than 50% of this time was spent in the counselling and/or coordination of care of this patient.  This note was created using voice recognition software and was not corrected for typographical or grammatical errors.    Signature: Jim Scruggs MD   Date: 7/7/2025

## 2025-07-24 NOTE — PROGRESS NOTES
Patient ID: Ligia Franklin is a 30 y.o. female.     Cancer Staging   Malignant neoplasm of female breast  Staging form: Breast, AJCC 8th Edition  - Clinical: Stage IB (cT1, cN0, cM0, G3, ER+, LA-, HER2-) - Signed by Addis العلي DO on 12/10/2024    Oncology History   Malignant neoplasm of female breast   6/5/2024 Initial Diagnosis    Malignant neoplasm of female breast     12/10/2024 Cancer Staged    Staging form: Breast, AJCC 8th Edition, Clinical: Stage IB (cT1, cN0, cM0, G3, ER+, LA-, HER2-) - Signed by Addis العلي DO on 12/10/2024     2/17/2025 - 4/24/2025 Chemotherapy    CUSTOM Abraxane + CycloPHOSphamide, 21 Day Cycles           Subjective    HPI  Ligia Franklin is a 30 y.o. female presenting for follow up of breast cancer.    Today she reports decent energy and good appetite. She reports she had a period 2-3 weeks weeks after stopping Lupron and then monthly. She denies recent infections, new pain, new lumps & bumps.       Patient's past medical history, surgical history, family history and social history reviewed.    Review of Systems:   Review of Systems:    Positive per HPI, otherwise negative.         Objective    BSA: 1.8 meters squared  /80   Pulse 84   Temp 36.8 °C (98.2 °F)   Resp 16   Wt 71.6 kg (157 lb 13.6 oz)   SpO2 98%   BMI 27.09 kg/m²        Physical Exam  Gen: appears well in clinic, NAD  HEENT: atraumatic head, normocephalic, EOMI, conjunctiva normal  LUNG: no increased WOB, CTAB  CV: No JVD. RRR  GI: soft, NT, ND  LE: no LE edema  Skin: no obvious rashes or lesions on visible skin  Neuro: interactive, no focal deficits noted  Psych: normal mood and affect      Performance Status:  Symptomatic; fully ambulatory    Labs/Imaging/Pathology: Personally reviewed reports and images in Epic electronic medical record system. Pertinent results as it related to the plan represented in below in assessment and plan.       Assessment/Plan   Right breast IDC, stage 1 [id number],  ER+95/CT+35, [id number] +BRCA2  - initially diagnosed after new brown nipple discharge during shower shortly after her son was born and was still breastfeeding  - Dx 2/9/2023 with 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 It was ER+95%, CT+35%  - Given her young age of onset and unknown biological history as she was adopted, she was genetically tested and tested positive for the BRCA2 mutation  -  3/31/2023 Dr. Addis العلي performed bilateeral skin sparing mastectomy and right sentinel lymph node biopsy (0/3), DCIS spanning 5cm on path.Focal 1mm anterior margin. Left skin sparing mastectomy showed benign findings. Dr. Jim Scruggs plastics did immediate reconstruction with bilateral prepectoral direct saline implants  - Reviewed at tumor board and no re-excision recommended and no RT  - Given DCIS findings alone and s/p bilateral mastectomy tumor there was not indication for endocrine therapy  - She also met with gyn/onc Dr. Tabitha Trevino and ppx oopherectomy was recommended when she was done with child bearing.  - follow up 10/21/24 for annual follow with plastics Dr Scruggs and reported discomfort with implants moving around and felt they were too large expressing interest in revision reconstruction  -  11/12/24 she underwent bilateral revision reconstruction with implant exchange, and fat grafting. Unfortunately on pathology she was found to have IDC G3, measuring 5mm in greatest dimension involving fibrous and fibroadipose tissue with DCIS and margins could not be assessed  - reexcision on 12/20/24 and noted to have two foci of IDC with DCIS with a positive new inferior margin. Final tumor size 12 mm overall grade 2. pT1cNx  -Oncotype completed and found to have RS 31  - planned for reexcision on 1/22/24  - Today we discussed given oncotype score 31 and her young age I would recommend adjuvant chemotherapy.  - We discussed Taxotere + Cytoxan given node negative and also  discussed AC+T however due to her schedule and concern for longer term toxicity, they are very reluctant and reviewed there is not strong data that anthracycline based therapy is needed at this time  - Reviewed side effects and consent signed today.   - She is planned for revision surgery 1/22/25  - discussed importance of fertility preservation, [facility] contacted  - Will plan to start treatment on 2/17/25 if fertility preservation treatment has been completed  - fitted for cooling cap today  - RTC one week post treatment for toxicity check with Matthew and with me for C2.  - Referral sent to onco fertility placed today.     3/10/25:   - CMP pending, CBC at goal   - No contraindications to proceed with full dose today   - Given her nausea and headaches, we recommended avoiding dexamethasone unless necessary. Zyprexa will be added at bedtime.   - We did discuss continuing Claritin daily.   - Will plan for toxicity check  this Friday and possible fluids  - Given the headache we will hold the aloxi to see if that helps and will plan for Zofran instead.   - RTC in 3 weeks    4/3/25:  - Proceed with Cycle 3 Abraxane + Cytoxan every 21 days  - Lupron every 28 days - Due 4/7   - Neulasta with each cycle    4/18/25:   - Reviewed labs from today, CBC pending    - Overall she has tolerated treatment well without significant GI toxicity or no new neuropathy   - If CBC at goal, will continue as planned for final cycle next week at full dose   - We did discuss no role for RT   - We will plan to see her 3 months post her last cycle and start endocrine therapy at that time with aromatase inhibitor   - Will plan to continue Leupron every 4 weeks   - She is concerned about weight gain. We discussed different options including working with our dietician   - She did report her chest pain resolved with taking her Wellbutryin   - Refill provided today for Wellbutryin 300 mg nightly   - Previously placed referral to cardiology   - RTC  in 3 months after last cycle      4/24/25:  # Breast Cancer   - Proceed with Cycle 4 Abraxane + Cytoxan today   - Continue Lupron every 28 days   - Neulasta today   # Anxiety/Chest pain  - Propranolol 10 mg BID as needed for anxiety   - Onco psych referral   - Deep breathing, mediatation, relaxation, distraction   # Hot flashes   - Fans, clothing layers, cooling blanket  # Nausea   - As needed antiemetics   - Olanzapine x 1 week post chemo  # Body aches  - IBU as needed  - OK for delta 9 THC gummies and CBD  - Monitor left hip pan  Follow up in in July with Dr. Garcia as scheduled.       7/28/25:   - Today we discussed the role of adjuvant endocrine therapy with Anastrazole   - Reviewed side effects and will start Anastrazole 2 mg daily   - We discussed the role of PARP inhibitors given BRCA2 mutations, which we will likely start 4-6 weeks after tolerating Anastrazole   - She will resume Lupron today   - Previously held Lupron due to concern for chest pain, now thought to be related to anxiety medication   - Will plan to start Lupron every 4 weeks   - She would like to know if Lupron could be administered at home   - I did send it to her specialty pharmacy to see if it could be covered   - RTC in 4 weeks         Reviewed ongoing medical problems and how they relate to her malignancy, will continue long term monitoring.    RTC in 4 weeks  This note has been transcribed using a medical scribe and there is a possibility of unintentional typing misprints    Diagnoses and all orders for this visit:  Malignant neoplasm of nipple of right breast in female, estrogen receptor positive  -     leuprolide, 1-month, (Lupron [place]) 3.75 mg injection; Inject 3.75 mg into the muscle every 28 (twenty-eight) days.  -     anastrozole (Arimidex) 1 mg tablet; Take 1 tablet (1 mg total) by mouth once daily.  Swallow whole with a drink of water.  -     Clinic Appointment Request; Future  Generalized anxiety disorder  -     Clinic  Appointment Request      Marion Garcia MD  Hematology/Oncology  [facility]      Bennye Attestation  By signing my name below, IShandra Scribe, attest that this documentation has been prepared under the direction and in the presence of Marion Garcia MD.    Provider Attestation  Marion RUST MD, personally performed the services described in the documentation as scribed by Shandra Bland, in my presence, and confirm it is both accurate and complete.     Time Spent  Prep time on day of patient encounter: 5 minutes  Time spent directly with patient, family or caregiver: 30 minutes  Additional Time Spent on Patient Care Activities: 5 minutes  Documentation Time: 6 minutes  Other Time Spent: 0 minutes  Total: 46 minutes

## 2025-07-28 ENCOUNTER — LAB (OUTPATIENT)
Dept: LAB | Facility: CLINIC | Age: 30
End: 2025-07-28
Payer: COMMERCIAL

## 2025-07-28 ENCOUNTER — OFFICE VISIT (OUTPATIENT)
Dept: HEMATOLOGY/ONCOLOGY | Facility: CLINIC | Age: 30
End: 2025-07-28
Payer: COMMERCIAL

## 2025-07-28 ENCOUNTER — SPECIALTY PHARMACY (OUTPATIENT)
Dept: PHARMACY | Facility: CLINIC | Age: 30
End: 2025-07-28

## 2025-07-28 ENCOUNTER — INFUSION (OUTPATIENT)
Dept: HEMATOLOGY/ONCOLOGY | Facility: CLINIC | Age: 30
End: 2025-07-28
Payer: COMMERCIAL

## 2025-07-28 VITALS
DIASTOLIC BLOOD PRESSURE: 80 MMHG | OXYGEN SATURATION: 98 % | BODY MASS INDEX: 27.09 KG/M2 | WEIGHT: 157.85 LBS | HEART RATE: 84 BPM | RESPIRATION RATE: 16 BRPM | SYSTOLIC BLOOD PRESSURE: 124 MMHG | TEMPERATURE: 98.2 F

## 2025-07-28 DIAGNOSIS — Z17.0 MALIGNANT NEOPLASM OF NIPPLE OF RIGHT BREAST IN FEMALE, ESTROGEN RECEPTOR POSITIVE: ICD-10-CM

## 2025-07-28 DIAGNOSIS — F41.1 GENERALIZED ANXIETY DISORDER: ICD-10-CM

## 2025-07-28 DIAGNOSIS — C50.011 MALIGNANT NEOPLASM OF NIPPLE OF RIGHT BREAST IN FEMALE, ESTROGEN RECEPTOR POSITIVE: Primary | ICD-10-CM

## 2025-07-28 DIAGNOSIS — C50.011 MALIGNANT NEOPLASM OF NIPPLE OF RIGHT BREAST IN FEMALE, ESTROGEN RECEPTOR POSITIVE: ICD-10-CM

## 2025-07-28 DIAGNOSIS — Z17.0 MALIGNANT NEOPLASM OF NIPPLE OF RIGHT BREAST IN FEMALE, ESTROGEN RECEPTOR POSITIVE: Primary | ICD-10-CM

## 2025-07-28 LAB
ALBUMIN SERPL BCP-MCNC: 4.3 G/DL (ref 3.4–5)
ALP SERPL-CCNC: 55 U/L (ref 33–110)
ALT SERPL W P-5'-P-CCNC: 15 U/L (ref 7–45)
ANION GAP SERPL CALC-SCNC: 13 MMOL/L (ref 10–20)
AST SERPL W P-5'-P-CCNC: 13 U/L (ref 9–39)
BASOPHILS # BLD AUTO: 0.03 X10*3/UL (ref 0–0.1)
BASOPHILS NFR BLD AUTO: 0.5 %
BILIRUB SERPL-MCNC: 0.2 MG/DL (ref 0–1.2)
BUN SERPL-MCNC: 18 MG/DL (ref 6–23)
CALCIUM SERPL-MCNC: 9 MG/DL (ref 8.6–10.3)
CANCER AG27-29 SERPL-ACNC: 10.9 U/ML (ref 0–38.6)
CHLORIDE SERPL-SCNC: 107 MMOL/L (ref 98–107)
CO2 SERPL-SCNC: 23 MMOL/L (ref 21–32)
CREAT SERPL-MCNC: 0.9 MG/DL (ref 0.5–1.05)
EGFRCR SERPLBLD CKD-EPI 2021: 89 ML/MIN/1.73M*2
EOSINOPHIL # BLD AUTO: 0.36 X10*3/UL (ref 0–0.7)
EOSINOPHIL NFR BLD AUTO: 5.5 %
ERYTHROCYTE [DISTWIDTH] IN BLOOD BY AUTOMATED COUNT: 12.7 % (ref 11.5–14.5)
GLUCOSE SERPL-MCNC: 101 MG/DL (ref 74–99)
HCT VFR BLD AUTO: 39 % (ref 36–46)
HGB BLD-MCNC: 13 G/DL (ref 12–16)
IMM GRANULOCYTES # BLD AUTO: 0 X10*3/UL (ref 0–0.7)
IMM GRANULOCYTES NFR BLD AUTO: 0 % (ref 0–0.9)
LYMPHOCYTES # BLD AUTO: 1.96 X10*3/UL (ref 1.2–4.8)
LYMPHOCYTES NFR BLD AUTO: 29.7 %
MCH RBC QN AUTO: 30.6 PG (ref 26–34)
MCHC RBC AUTO-ENTMCNC: 33.3 G/DL (ref 32–36)
MCV RBC AUTO: 92 FL (ref 80–100)
MONOCYTES # BLD AUTO: 0.39 X10*3/UL (ref 0.1–1)
MONOCYTES NFR BLD AUTO: 5.9 %
NEUTROPHILS # BLD AUTO: 3.85 X10*3/UL (ref 1.2–7.7)
NEUTROPHILS NFR BLD AUTO: 58.4 %
NRBC BLD-RTO: NORMAL /100{WBCS}
PLATELET # BLD AUTO: 238 X10*3/UL (ref 150–450)
POTASSIUM SERPL-SCNC: 4.2 MMOL/L (ref 3.5–5.3)
PROT SERPL-MCNC: 6.9 G/DL (ref 6.4–8.2)
RBC # BLD AUTO: 4.25 X10*6/UL (ref 4–5.2)
SODIUM SERPL-SCNC: 139 MMOL/L (ref 136–145)
WBC # BLD AUTO: 6.6 X10*3/UL (ref 4.4–11.3)

## 2025-07-28 PROCEDURE — 2500000004 HC RX 250 GENERAL PHARMACY W/ HCPCS (ALT 636 FOR OP/ED): Mod: JZ,TB

## 2025-07-28 PROCEDURE — 86300 IMMUNOASSAY TUMOR CA 15-3: CPT

## 2025-07-28 PROCEDURE — 1036F TOBACCO NON-USER: CPT | Performed by: INTERNAL MEDICINE

## 2025-07-28 PROCEDURE — 36415 COLL VENOUS BLD VENIPUNCTURE: CPT

## 2025-07-28 PROCEDURE — 99215 OFFICE O/P EST HI 40 MIN: CPT | Performed by: INTERNAL MEDICINE

## 2025-07-28 PROCEDURE — 99215 OFFICE O/P EST HI 40 MIN: CPT | Mod: 25 | Performed by: INTERNAL MEDICINE

## 2025-07-28 PROCEDURE — 96402 CHEMO HORMON ANTINEOPL SQ/IM: CPT

## 2025-07-28 PROCEDURE — 85025 COMPLETE CBC W/AUTO DIFF WBC: CPT

## 2025-07-28 PROCEDURE — 84075 ASSAY ALKALINE PHOSPHATASE: CPT

## 2025-07-28 PROCEDURE — 89240 UNLISTED MISC PATH TEST: CPT

## 2025-07-28 RX ORDER — ANASTROZOLE 1 MG/1
1 TABLET ORAL DAILY
Qty: 30 TABLET | Refills: 6 | Status: SHIPPED | OUTPATIENT
Start: 2025-07-28 | End: 2025-08-27

## 2025-07-28 RX ORDER — LEUPROLIDE ACETATE 3.75 MG
3.75 KIT INTRAMUSCULAR
Qty: 1 EACH | Refills: 11 | Status: SHIPPED | OUTPATIENT
Start: 2025-07-28 | End: 2026-07-28

## 2025-07-28 RX ORDER — FAMOTIDINE 10 MG/ML
20 INJECTION, SOLUTION INTRAVENOUS ONCE AS NEEDED
OUTPATIENT
Start: 2025-08-25

## 2025-07-28 RX ORDER — ALBUTEROL SULFATE 0.83 MG/ML
3 SOLUTION RESPIRATORY (INHALATION) AS NEEDED
OUTPATIENT
Start: 2025-08-25

## 2025-07-28 RX ORDER — DIPHENHYDRAMINE HYDROCHLORIDE 50 MG/ML
50 INJECTION, SOLUTION INTRAMUSCULAR; INTRAVENOUS AS NEEDED
OUTPATIENT
Start: 2025-08-25

## 2025-07-28 RX ORDER — EPINEPHRINE 0.3 MG/.3ML
0.3 INJECTION SUBCUTANEOUS EVERY 5 MIN PRN
OUTPATIENT
Start: 2025-08-25

## 2025-07-28 RX ADMIN — LEUPROLIDE ACETATE 3.75 MG: KIT at 10:35

## 2025-07-28 ASSESSMENT — PAIN SCALES - GENERAL: PAINLEVEL_OUTOF10: 0-NO PAIN

## 2025-08-05 ENCOUNTER — TELEPHONE (OUTPATIENT)
Dept: HEMATOLOGY/ONCOLOGY | Facility: CLINIC | Age: 30
End: 2025-08-05
Payer: COMMERCIAL

## 2025-08-05 NOTE — TELEPHONE ENCOUNTER
Patient calling to clarify if spotting is normal on Anastrozole.  Patient currently on vacation.  Asking for call back to discuss, or, if everything is ok. Please message her via [email or web address].

## 2025-08-07 DIAGNOSIS — Z17.0 MALIGNANT NEOPLASM OF NIPPLE OF RIGHT BREAST IN FEMALE, ESTROGEN RECEPTOR POSITIVE: ICD-10-CM

## 2025-08-07 DIAGNOSIS — C50.011 MALIGNANT NEOPLASM OF NIPPLE OF RIGHT BREAST IN FEMALE, ESTROGEN RECEPTOR POSITIVE: ICD-10-CM

## 2025-08-07 RX ORDER — LEUPROLIDE ACETATE 3.75 MG
3.75 KIT INTRAMUSCULAR
Qty: 1 EACH | Refills: 11 | Status: SHIPPED | OUTPATIENT
Start: 2025-08-07 | End: 2026-08-07

## 2025-08-07 NOTE — PROGRESS NOTES
Lupron prescription updated to [facility] per patient's insurance. Prescription dosage/regimen was not edited from original placed by Dr. Garcia.

## 2025-08-12 LAB
ATRIAL RATE: 89 BPM
ATRIAL RATE: 91 BPM
P AXIS: 60 DEGREES
P AXIS: 62 DEGREES
P OFFSET: 187 MS
P OFFSET: 193 MS
P ONSET: 141 MS
P ONSET: 155 MS
PR INTERVAL: 134 MS
PR INTERVAL: 136 MS
Q ONSET: 208 MS
Q ONSET: 223 MS
QRS COUNT: 15 BEATS
QRS COUNT: 15 BEATS
QRS DURATION: 82 MS
QRS DURATION: 86 MS
QT INTERVAL: 372 MS
QT INTERVAL: 376 MS
QTC CALCULATION(BAZETT): 452 MS
QTC CALCULATION(BAZETT): 462 MS
QTC FREDERICIA: 424 MS
QTC FREDERICIA: 432 MS
R AXIS: 26 DEGREES
R AXIS: 32 DEGREES
SCAN RESULT: NORMAL
T AXIS: 40 DEGREES
T AXIS: 46 DEGREES
T OFFSET: 394 MS
T OFFSET: 411 MS
VENTRICULAR RATE: 89 BPM
VENTRICULAR RATE: 91 BPM

## 2025-08-19 ENCOUNTER — OFFICE VISIT (OUTPATIENT)
Dept: HEMATOLOGY/ONCOLOGY | Facility: CLINIC | Age: 30
End: 2025-08-19
Payer: COMMERCIAL

## 2025-08-19 ENCOUNTER — TELEPHONE (OUTPATIENT)
Dept: HEMATOLOGY/ONCOLOGY | Facility: CLINIC | Age: 30
End: 2025-08-19
Payer: COMMERCIAL

## 2025-08-19 ENCOUNTER — PATIENT MESSAGE (OUTPATIENT)
Dept: HEMATOLOGY/ONCOLOGY | Facility: CLINIC | Age: 30
End: 2025-08-19
Payer: COMMERCIAL

## 2025-08-19 VITALS
SYSTOLIC BLOOD PRESSURE: 143 MMHG | HEART RATE: 81 BPM | OXYGEN SATURATION: 96 % | DIASTOLIC BLOOD PRESSURE: 81 MMHG | BODY MASS INDEX: 26.75 KG/M2 | RESPIRATION RATE: 16 BRPM | WEIGHT: 155.87 LBS | TEMPERATURE: 97.7 F

## 2025-08-19 DIAGNOSIS — F41.9 ANXIETY: Primary | ICD-10-CM

## 2025-08-19 DIAGNOSIS — M79.2 NERVE PAIN: ICD-10-CM

## 2025-08-19 DIAGNOSIS — Z17.0 MALIGNANT NEOPLASM OF LOWER-OUTER QUADRANT OF RIGHT BREAST OF FEMALE, ESTROGEN RECEPTOR POSITIVE: ICD-10-CM

## 2025-08-19 DIAGNOSIS — C50.511 MALIGNANT NEOPLASM OF LOWER-OUTER QUADRANT OF RIGHT BREAST OF FEMALE, ESTROGEN RECEPTOR POSITIVE: ICD-10-CM

## 2025-08-19 PROCEDURE — 2500000001 HC RX 250 WO HCPCS SELF ADMINISTERED DRUGS (ALT 637 FOR MEDICARE OP): Performed by: INTERNAL MEDICINE

## 2025-08-19 PROCEDURE — 99214 OFFICE O/P EST MOD 30 MIN: CPT | Performed by: INTERNAL MEDICINE

## 2025-08-19 RX ORDER — GABAPENTIN 100 MG/1
100 CAPSULE ORAL 3 TIMES DAILY
Qty: 60 CAPSULE | Refills: 3 | Status: SHIPPED | OUTPATIENT
Start: 2025-08-19 | End: 2026-08-19

## 2025-08-19 RX ORDER — LORAZEPAM 1 MG/1
0.5 TABLET ORAL ONCE
Status: COMPLETED | OUTPATIENT
Start: 2025-08-19 | End: 2025-08-19

## 2025-08-19 RX ORDER — LORAZEPAM 0.5 MG/1
0.5 TABLET ORAL NIGHTLY PRN
Qty: 15 TABLET | Refills: 0 | Status: SHIPPED | OUTPATIENT
Start: 2025-08-19 | End: 2025-10-18

## 2025-08-19 RX ORDER — LORAZEPAM 1 MG/1
0.5 TABLET ORAL ONCE
OUTPATIENT
Start: 2025-08-19 | End: 2025-08-19

## 2025-08-19 RX ADMIN — LORAZEPAM 0.5 MG: 1 TABLET ORAL at 11:35

## 2025-08-19 ASSESSMENT — PAIN SCALES - GENERAL: PAINLEVEL_OUTOF10: 0-NO PAIN

## 2025-08-20 ENCOUNTER — APPOINTMENT (OUTPATIENT)
Dept: RADIOLOGY | Facility: HOSPITAL | Age: 30
End: 2025-08-20
Payer: COMMERCIAL

## 2025-08-20 ENCOUNTER — HOSPITAL ENCOUNTER (EMERGENCY)
Facility: HOSPITAL | Age: 30
Discharge: HOME | End: 2025-08-20
Attending: STUDENT IN AN ORGANIZED HEALTH CARE EDUCATION/TRAINING PROGRAM
Payer: COMMERCIAL

## 2025-08-20 ENCOUNTER — APPOINTMENT (OUTPATIENT)
Dept: CARDIOLOGY | Facility: HOSPITAL | Age: 30
End: 2025-08-20
Payer: COMMERCIAL

## 2025-08-20 VITALS
WEIGHT: 155 LBS | BODY MASS INDEX: 26.46 KG/M2 | HEART RATE: 94 BPM | OXYGEN SATURATION: 95 % | TEMPERATURE: 98.2 F | SYSTOLIC BLOOD PRESSURE: 117 MMHG | DIASTOLIC BLOOD PRESSURE: 68 MMHG | RESPIRATION RATE: 18 BRPM | HEIGHT: 64 IN

## 2025-08-20 DIAGNOSIS — R07.9 CHEST PAIN, UNSPECIFIED TYPE: Primary | ICD-10-CM

## 2025-08-20 LAB
ALBUMIN SERPL BCP-MCNC: 4.7 G/DL (ref 3.4–5)
ALP SERPL-CCNC: 68 U/L (ref 33–110)
ALT SERPL W P-5'-P-CCNC: 18 U/L (ref 7–45)
ANION GAP SERPL CALC-SCNC: 12 MMOL/L (ref 10–20)
AST SERPL W P-5'-P-CCNC: 15 U/L (ref 9–39)
ATRIAL RATE: 82 BPM
B-HCG SERPL-ACNC: <2 MIU/ML
BASOPHILS # BLD AUTO: 0.04 X10*3/UL (ref 0–0.1)
BASOPHILS NFR BLD AUTO: 0.5 %
BILIRUB SERPL-MCNC: 0.3 MG/DL (ref 0–1.2)
BUN SERPL-MCNC: 13 MG/DL (ref 6–23)
CALCIUM SERPL-MCNC: 9.7 MG/DL (ref 8.6–10.3)
CARDIAC TROPONIN I PNL SERPL HS: <3 NG/L (ref 0–13)
CARDIAC TROPONIN I PNL SERPL HS: <3 NG/L (ref 0–13)
CHLORIDE SERPL-SCNC: 104 MMOL/L (ref 98–107)
CO2 SERPL-SCNC: 28 MMOL/L (ref 21–32)
CREAT SERPL-MCNC: 0.94 MG/DL (ref 0.5–1.05)
D DIMER PPP FEU-MCNC: 420 NG/ML FEU
EGFRCR SERPLBLD CKD-EPI 2021: 84 ML/MIN/1.73M*2
EOSINOPHIL # BLD AUTO: 0.35 X10*3/UL (ref 0–0.7)
EOSINOPHIL NFR BLD AUTO: 4.8 %
ERYTHROCYTE [DISTWIDTH] IN BLOOD BY AUTOMATED COUNT: 13 % (ref 11.5–14.5)
GLUCOSE SERPL-MCNC: 94 MG/DL (ref 74–99)
HCT VFR BLD AUTO: 40.7 % (ref 36–46)
HGB BLD-MCNC: 13.9 G/DL (ref 12–16)
IMM GRANULOCYTES # BLD AUTO: 0.02 X10*3/UL (ref 0–0.7)
IMM GRANULOCYTES NFR BLD AUTO: 0.3 % (ref 0–0.9)
LYMPHOCYTES # BLD AUTO: 2.04 X10*3/UL (ref 1.2–4.8)
LYMPHOCYTES NFR BLD AUTO: 27.8 %
MAGNESIUM SERPL-MCNC: 2.24 MG/DL (ref 1.6–2.4)
MCH RBC QN AUTO: 30.5 PG (ref 26–34)
MCHC RBC AUTO-ENTMCNC: 34.2 G/DL (ref 32–36)
MCV RBC AUTO: 89 FL (ref 80–100)
MONOCYTES # BLD AUTO: 0.44 X10*3/UL (ref 0.1–1)
MONOCYTES NFR BLD AUTO: 6 %
NEUTROPHILS # BLD AUTO: 4.45 X10*3/UL (ref 1.2–7.7)
NEUTROPHILS NFR BLD AUTO: 60.6 %
NRBC BLD-RTO: 0 /100 WBCS (ref 0–0)
P AXIS: 62 DEGREES
P OFFSET: 205 MS
P ONSET: 157 MS
PLATELET # BLD AUTO: 237 X10*3/UL (ref 150–450)
POTASSIUM SERPL-SCNC: 4 MMOL/L (ref 3.5–5.3)
PR INTERVAL: 140 MS
PROT SERPL-MCNC: 7.7 G/DL (ref 6.4–8.2)
Q ONSET: 227 MS
QRS COUNT: 14 BEATS
QRS DURATION: 76 MS
QT INTERVAL: 376 MS
QTC CALCULATION(BAZETT): 439 MS
QTC FREDERICIA: 417 MS
R AXIS: 50 DEGREES
RBC # BLD AUTO: 4.56 X10*6/UL (ref 4–5.2)
SODIUM SERPL-SCNC: 140 MMOL/L (ref 136–145)
T AXIS: 50 DEGREES
T OFFSET: 415 MS
VENTRICULAR RATE: 82 BPM
WBC # BLD AUTO: 7.3 X10*3/UL (ref 4.4–11.3)

## 2025-08-20 PROCEDURE — 71046 X-RAY EXAM CHEST 2 VIEWS: CPT

## 2025-08-20 PROCEDURE — 36415 COLL VENOUS BLD VENIPUNCTURE: CPT | Performed by: STUDENT IN AN ORGANIZED HEALTH CARE EDUCATION/TRAINING PROGRAM

## 2025-08-20 PROCEDURE — 85025 COMPLETE CBC W/AUTO DIFF WBC: CPT

## 2025-08-20 PROCEDURE — 85379 FIBRIN DEGRADATION QUANT: CPT | Performed by: STUDENT IN AN ORGANIZED HEALTH CARE EDUCATION/TRAINING PROGRAM

## 2025-08-20 PROCEDURE — 84484 ASSAY OF TROPONIN QUANT: CPT

## 2025-08-20 PROCEDURE — 99285 EMERGENCY DEPT VISIT HI MDM: CPT | Mod: 25 | Performed by: STUDENT IN AN ORGANIZED HEALTH CARE EDUCATION/TRAINING PROGRAM

## 2025-08-20 PROCEDURE — 36415 COLL VENOUS BLD VENIPUNCTURE: CPT

## 2025-08-20 PROCEDURE — 93005 ELECTROCARDIOGRAM TRACING: CPT

## 2025-08-20 PROCEDURE — 84702 CHORIONIC GONADOTROPIN TEST: CPT

## 2025-08-20 PROCEDURE — 83735 ASSAY OF MAGNESIUM: CPT

## 2025-08-20 PROCEDURE — 84075 ASSAY ALKALINE PHOSPHATASE: CPT

## 2025-08-20 ASSESSMENT — PAIN DESCRIPTION - DESCRIPTORS
DESCRIPTORS: ACHING
DESCRIPTORS: ACHING

## 2025-08-20 ASSESSMENT — PAIN SCALES - GENERAL
PAINLEVEL_OUTOF10: 0 - NO PAIN
PAINLEVEL_OUTOF10: 3

## 2025-08-20 ASSESSMENT — PAIN DESCRIPTION - LOCATION: LOCATION: CHEST

## 2025-08-20 ASSESSMENT — LIFESTYLE VARIABLES
HAVE YOU EVER FELT YOU SHOULD CUT DOWN ON YOUR DRINKING: NO
TOTAL SCORE: 0
EVER HAD A DRINK FIRST THING IN THE MORNING TO STEADY YOUR NERVES TO GET RID OF A HANGOVER: NO
HAVE PEOPLE ANNOYED YOU BY CRITICIZING YOUR DRINKING: NO
EVER FELT BAD OR GUILTY ABOUT YOUR DRINKING: NO

## 2025-08-20 ASSESSMENT — PAIN DESCRIPTION - ORIENTATION: ORIENTATION: LEFT

## 2025-08-20 ASSESSMENT — PAIN - FUNCTIONAL ASSESSMENT: PAIN_FUNCTIONAL_ASSESSMENT: 0-10

## 2025-08-25 ENCOUNTER — RESULTS FOLLOW-UP (OUTPATIENT)
Dept: HEMATOLOGY/ONCOLOGY | Facility: CLINIC | Age: 30
End: 2025-08-25
Payer: COMMERCIAL

## 2025-08-25 LAB
SCAN RESULT: NORMAL
SCAN RESULT: NORMAL

## 2025-08-28 ENCOUNTER — OFFICE VISIT (OUTPATIENT)
Dept: HEMATOLOGY/ONCOLOGY | Facility: CLINIC | Age: 30
End: 2025-08-28
Payer: COMMERCIAL

## 2025-08-28 ENCOUNTER — INFUSION (OUTPATIENT)
Dept: HEMATOLOGY/ONCOLOGY | Facility: CLINIC | Age: 30
End: 2025-08-28
Payer: COMMERCIAL

## 2025-08-28 VITALS
DIASTOLIC BLOOD PRESSURE: 84 MMHG | SYSTOLIC BLOOD PRESSURE: 124 MMHG | HEART RATE: 83 BPM | TEMPERATURE: 97.3 F | WEIGHT: 159.39 LBS | OXYGEN SATURATION: 97 % | BODY MASS INDEX: 27.36 KG/M2 | RESPIRATION RATE: 16 BRPM

## 2025-08-28 DIAGNOSIS — C50.011 MALIGNANT NEOPLASM OF NIPPLE OF RIGHT BREAST IN FEMALE, ESTROGEN RECEPTOR POSITIVE: ICD-10-CM

## 2025-08-28 DIAGNOSIS — Z17.0 MALIGNANT NEOPLASM OF NIPPLE OF RIGHT BREAST IN FEMALE, ESTROGEN RECEPTOR POSITIVE: ICD-10-CM

## 2025-08-28 PROCEDURE — 99215 OFFICE O/P EST HI 40 MIN: CPT | Performed by: INTERNAL MEDICINE

## 2025-08-28 RX ORDER — TAMOXIFEN CITRATE 20 MG/1
20 TABLET ORAL DAILY
Qty: 30 TABLET | Refills: 11 | Status: SHIPPED | OUTPATIENT
Start: 2025-08-28 | End: 2026-08-28

## 2025-08-28 ASSESSMENT — PAIN SCALES - GENERAL: PAINLEVEL_OUTOF10: 0-NO PAIN

## 2025-09-02 ENCOUNTER — TELEPHONE (OUTPATIENT)
Dept: HEMATOLOGY/ONCOLOGY | Facility: CLINIC | Age: 30
End: 2025-09-02
Payer: COMMERCIAL

## 2025-09-03 ENCOUNTER — PATIENT MESSAGE (OUTPATIENT)
Dept: SURGICAL ONCOLOGY | Facility: CLINIC | Age: 30
End: 2025-09-03
Payer: COMMERCIAL

## 2025-09-03 DIAGNOSIS — C50.511 MALIGNANT NEOPLASM OF LOWER-OUTER QUADRANT OF RIGHT BREAST OF FEMALE, ESTROGEN RECEPTOR POSITIVE: ICD-10-CM

## 2025-09-03 DIAGNOSIS — Z17.0 MALIGNANT NEOPLASM OF LOWER-OUTER QUADRANT OF RIGHT BREAST OF FEMALE, ESTROGEN RECEPTOR POSITIVE: ICD-10-CM

## 2025-09-04 ENCOUNTER — TELEPHONE (OUTPATIENT)
Dept: HEMATOLOGY/ONCOLOGY | Facility: CLINIC | Age: 30
End: 2025-09-04
Payer: COMMERCIAL

## 2025-09-04 ENCOUNTER — TELEMEDICINE (OUTPATIENT)
Dept: HEMATOLOGY/ONCOLOGY | Facility: HOSPITAL | Age: 30
End: 2025-09-04
Payer: COMMERCIAL

## 2025-09-04 DIAGNOSIS — Z17.0 MALIGNANT NEOPLASM OF CENTRAL PORTION OF RIGHT BREAST IN FEMALE, ESTROGEN RECEPTOR POSITIVE: Primary | ICD-10-CM

## 2025-09-04 DIAGNOSIS — C50.111 MALIGNANT NEOPLASM OF CENTRAL PORTION OF RIGHT BREAST IN FEMALE, ESTROGEN RECEPTOR POSITIVE: Primary | ICD-10-CM

## 2025-09-04 PROCEDURE — 1036F TOBACCO NON-USER: CPT

## 2025-09-04 PROCEDURE — 99215 OFFICE O/P EST HI 40 MIN: CPT

## 2025-09-06 DIAGNOSIS — B37.31 CANDIDIASIS OF VAGINA: Primary | ICD-10-CM

## 2025-09-06 RX ORDER — FLUCONAZOLE 150 MG/1
150 TABLET ORAL ONCE
Qty: 1 TABLET | Refills: 3 | Status: SHIPPED | OUTPATIENT
Start: 2025-09-06 | End: 2025-09-06

## 2025-10-01 ENCOUNTER — APPOINTMENT (OUTPATIENT)
Dept: HEMATOLOGY/ONCOLOGY | Facility: CLINIC | Age: 30
End: 2025-10-01
Payer: COMMERCIAL

## (undated) DEVICE — GLOVE, SURGICAL, PROTEXIS PI , 5.5, PF, LF

## (undated) DEVICE — TIP,  ELECTRODE COATED INSULATED, EXTENDED, LF

## (undated) DEVICE — CLOSURE SYSTEM, DERMABOND, PRINEO, 22CM, STERILE

## (undated) DEVICE — MARKER, SKIN, DUAL TIP, W/RULER AND LABEL

## (undated) DEVICE — Device

## (undated) DEVICE — DRESSING, ADHESIVE, ISLAND, TELFA, 2 X 3.75 IN, LF

## (undated) DEVICE — SUTURE, PDS II, 2-0, 27 IN, SH, VIOLET

## (undated) DEVICE — DRAPE, MAYO STAND, STERILE-Z

## (undated) DEVICE — 10FT MICROAIRE-TYPE HIGH FLOW TAPERED ASPIRATION TUBING

## (undated) DEVICE — DRESSING, NON-ADHERENT, TELFA, 2 X 3 IN, STERILE

## (undated) DEVICE — SUCTION TIP , YANKAUER, W/BULB SUCTION

## (undated) DEVICE — GLOVE, SURGICAL, PROTEXIS PI MICRO, 8.0, PF, LF

## (undated) DEVICE — TUBING, SUCTION, CONNECTING, STERILE 0.25 X 120 IN., LF

## (undated) DEVICE — SYRINGE, 3 CC, LUER LOCK, W/NEEDLE, 23 G X 1 IN

## (undated) DEVICE — KIT, TUMESCENT INFILTRATION W/INJ NEEDLE, 3WAY VALVE

## (undated) DEVICE — APPLICATOR, CHLORAPREP, W/ORANGE TINT, 26ML

## (undated) DEVICE — SUTURE, VICRYL, 2-0, 27 IN, SH, UNDYED

## (undated) DEVICE — GOWN, ASTOUND, L

## (undated) DEVICE — IRRIGATION TRAY, SYRINGE, 60 CC, BULB

## (undated) DEVICE — PROBE COVER, ULTRASOUND, 6 X 96, GEL PACKET

## (undated) DEVICE — SOLUTION, IRRIGATION, STERILE WATER, 1000 ML, POUR BOTTLE

## (undated) DEVICE — ADHESIVE, SKIN, MASTISOL, 2/3 CC VIAL

## (undated) DEVICE — SLEEVE, SUCTION, E-SEP, 125MM

## (undated) DEVICE — DRAPE, INCISE, ANTIMICROBIAL, IOBAN 2, STERI DRAPE, 23 X 33 IN, DISPOSABLE, STERILE

## (undated) DEVICE — KIT, MARGINMARKER, 6 INK COLORS, STANDARD

## (undated) DEVICE — SUTURE, PLAIN GUT, 4-0, RB-1, 27"

## (undated) DEVICE — TOWEL PACK, STERILE, 4/PACK, BLUE

## (undated) DEVICE — SUTURE, MONOCRYL, 4-0, 18 IN, PS2, UNDYED

## (undated) DEVICE — SUTURE, PROLENE, 0, 30 IN, SH

## (undated) DEVICE — NEEDLE, FILTER 19 G X 1 IN

## (undated) DEVICE — TOWEL, SURGICAL, NEURO, O/R, 16 X 26, BLUE, STERILE

## (undated) DEVICE — COVER, PLASTIC, MAYO STAND, 29.5IN X 55.5IN

## (undated) DEVICE — CONTAINER STERILE SPECIMEN 90ML, STERILE

## (undated) DEVICE — SUTURE, MONOCRYL, 4-0, 27 IN, PS-2, UNDYED

## (undated) DEVICE — SUTURE, PLAIN, 5-0, 18 IN, P3, YELLOW

## (undated) DEVICE — SUTURE, PDS II, 0, 27 IN, CT1, VIOLET

## (undated) DEVICE — CONTAINER, SPECIMEN, LAB W/LID, 8 OZ

## (undated) DEVICE — GLOVE, SURGICAL, PROTEXIS PI , 8.0, PF, LF

## (undated) DEVICE — CONTAINER, SPECIMEN, 4 OZ, OR PEEL PACK, STERILE

## (undated) DEVICE — SOLUTION, INJECTION, USP, SODIUM CHLORIDE 0.9%, .9, NACL, 1000 ML, BAG

## (undated) DEVICE — SUTURE, VICRYL, 3-0, 27 IN, SH

## (undated) DEVICE — PUREGRAFT 850 SUCTION LIPOPLASTY SYSTEM, 1 PUREGRAFT 850 BAG, 1 PUREGRAFT 850 DRAIN BAG, 12 TISSUE ACCESS PORT ADAPTER (TAPA), 2 LUER LOCK SYRINGE ADAPTER

## (undated) DEVICE — PACK, BASIC

## (undated) DEVICE — EVACUATOR, WOUND, SUCTION, CLOSED, JACKSON-PRATT, 100 CC, SILICONE

## (undated) DEVICE — SYRINGE, 60 CC, IRRIGATION, BULB, CONTRO-BULB, PAPER POUCH

## (undated) DEVICE — PROBE COVER, INTRAOPERATIVE, 13 X 244CM (5 X 96IN)

## (undated) DEVICE — BANDAGE, ELASTIC, REINFORCED, ECONO-WRAP, 6 IN X 4.5 YD, LATEX

## (undated) DEVICE — SYRINGE, 60 CC, LUER LOCK, MONOJECT, W/O CAP, LF

## (undated) DEVICE — SUTURE, STRATAFIX, 3-0, SPIRAL MONOCRYL PLUS, PS, 70CM, UNDYED

## (undated) DEVICE — SPONGE, LAP, XRAY DECT, SC+RFID, 18X18, NO LOOP, STERILE

## (undated) DEVICE — DRESSING, GAUZE, SPONGE, KERLIX, SUPER, 6 X 6.75 IN, STERILE 10PK

## (undated) DEVICE — 13FT VITRUVIAN SOFTOUCH PUMP TUBING

## (undated) DEVICE — SUTURE, VICRYL, 3-0, 27 IN, SH, VIOLET

## (undated) DEVICE — SUTURE, PDS II, 5-0, 18 IN, P3, CLEAR

## (undated) DEVICE — RETRACTOR, HANDHELD, 250ML, DBL ENDED

## (undated) DEVICE — COVER, MAYO STAND, W/PAD, 23 IN, DISPOSABLE, PLASTIC, LF, STERILE

## (undated) DEVICE — DRAIN, JP CHANNEL, 15 FR, RND, W/TROCAR

## (undated) DEVICE — BANDAGE, GAUZE, 6 PLY, KERLIX, 4.5 IN X 4.1 YD, AMD, STERILE

## (undated) DEVICE — GLOVE, SURGICAL, PROTEXIS PI MICRO, 7.5, PF, LF

## (undated) DEVICE — ELECTRODE, ELECTROSURGICAL, BLADE EXT 4 INCH, INSULATED

## (undated) DEVICE — SUTURE, PLAIN GUT 4/0 18  P-12

## (undated) DEVICE — MARKER, SKIN, RULER AND LABEL PACK, CUSTOM

## (undated) DEVICE — STAPLER, SKIN, PLUS, REGULAR, 35

## (undated) DEVICE — SYRINGE, 60 CC, LUER LOCK, MONOJECT

## (undated) DEVICE — NEEDLE, SPINAL, 22 G X 3.5 IN, BLACK HUB

## (undated) DEVICE — STRIP, SKIN CLOSURE, STERI STRIP, REINFORCED, 0.5 X 4 IN

## (undated) DEVICE — SOLUTION, IRRIGATION, SODIUM CHLORIDE 0.9%, 1000 ML, POUR BOTTLE

## (undated) DEVICE — DRESSING, TRANSPARENT, TEGADERM, 4 X 4-3/4 IN

## (undated) DEVICE — SYRINGE, 20 CC, LUER LOCK

## (undated) DEVICE — 30MM X 135MM ONETRAC LX CORDLESS RETRACTOR, W/INTEGRATED MULTI-LED

## (undated) DEVICE — WOUND SYSTEM, DEBRIDEMENT & CLEANING, O.R DUOPAK

## (undated) DEVICE — GLOVE, SURGICAL, PROTEXIS PI , 7.5, PF, LF

## (undated) DEVICE — GLOVE, SURGICAL, PROTEGRITY, NEU-THERA, 8.0, PF, LATEX

## (undated) DEVICE — SUTURE, PDS II, 5-0, 18 IN, PC-3, UNDYED

## (undated) DEVICE — SUTURE, SILK, 0, 30 IN, CT-1, BLACK

## (undated) DEVICE — MARKER, SKIN, REGULAR TIP, W/W/FLEXI RULER, LABEL

## (undated) DEVICE — DRAPE PACK, UNIVERSAL II

## (undated) DEVICE — SYRINGE, 10 CC, LUER LOCK

## (undated) DEVICE — GLOVE, SURGICAL, PROTEXIS,  6.5, PF, LATEX

## (undated) DEVICE — BANDAGE, ELASTIC, REINFORCED, ECONO-WRAP, 4 IN X 4.5 YD, LATEX

## (undated) DEVICE — IRRIGATION KIT, PUMPING, SINGLE ACTION, SYRINGE, 10 CC

## (undated) DEVICE — SYRINGE, 20 CC, CONTROL, LUER LOCK, LF

## (undated) DEVICE — DRESSING, ABDOMINAL PAD, CURITY, 8 X 10 IN

## (undated) DEVICE — BAG, DECANTER

## (undated) DEVICE — NEEDLE, HYPODERMIC, REGULAR WALL, REGULAR BEVEL, 18 G X 1.5 IN

## (undated) DEVICE — SUTURE, STRATAFIX, SPIRAL MONOCRYL PLUS, 3-0, PS-2 45CM, UNDYED

## (undated) DEVICE — TISSUE ADHESIVE, EXOFIN, 1ML